# Patient Record
Sex: MALE | Race: WHITE | Employment: OTHER | URBAN - METROPOLITAN AREA
[De-identification: names, ages, dates, MRNs, and addresses within clinical notes are randomized per-mention and may not be internally consistent; named-entity substitution may affect disease eponyms.]

---

## 2017-10-05 PROBLEM — R55 NEAR SYNCOPE: Status: ACTIVE | Noted: 2017-10-05

## 2017-10-05 PROBLEM — R42 VERTIGO: Status: ACTIVE | Noted: 2017-10-05

## 2017-10-05 PROBLEM — G47.30 SLEEP APNEA: Status: ACTIVE | Noted: 2017-10-05

## 2017-10-05 PROBLEM — G45.0 VERTEBROBASILAR TIAS: Status: ACTIVE | Noted: 2017-10-05

## 2018-03-15 ENCOUNTER — HOSPITAL ENCOUNTER (OUTPATIENT)
Age: 73
Discharge: HOME | End: 2018-03-17
Payer: MEDICARE

## 2018-03-15 LAB
ALBUMIN SERPL-MCNC: 4 G/DL (ref 3.5–5.2)
ALP BLD-CCNC: 46 U/L (ref 40–129)
ALT SERPL-CCNC: 14 U/L (ref 0–40)
ANION GAP SERPL CALCULATED.3IONS-SCNC: 15 MMOL/L (ref 7–16)
AST SERPL-CCNC: 16 U/L (ref 0–39)
BASOPHILS ABSOLUTE: 0.02 E9/L (ref 0–0.2)
BASOPHILS RELATIVE PERCENT: 0.3 % (ref 0–2)
BILIRUB SERPL-MCNC: 0.4 MG/DL (ref 0–1.2)
BUN BLDV-MCNC: 21 MG/DL (ref 8–23)
CALCIUM SERPL-MCNC: 9.5 MG/DL (ref 8.6–10.2)
CHLORIDE BLD-SCNC: 101 MMOL/L (ref 98–107)
CHOLESTEROL, TOTAL: 170 MG/DL (ref 0–199)
CO2: 25 MMOL/L (ref 22–29)
CREAT SERPL-MCNC: 1.3 MG/DL (ref 0.7–1.2)
EOSINOPHILS ABSOLUTE: 0.24 E9/L (ref 0.05–0.5)
EOSINOPHILS RELATIVE PERCENT: 3.1 % (ref 0–6)
GFR AFRICAN AMERICAN: >60
GFR NON-AFRICAN AMERICAN: 54 ML/MIN/1.73
GLUCOSE BLD-MCNC: 140 MG/DL (ref 74–109)
HCT VFR BLD CALC: 44.7 % (ref 37–54)
HDLC SERPL-MCNC: 42 MG/DL
HEMOGLOBIN: 15.2 G/DL (ref 12.5–16.5)
IMMATURE GRANULOCYTES #: 0.06 E9/L
IMMATURE GRANULOCYTES %: 0.8 % (ref 0–5)
LDL CHOLESTEROL CALCULATED: 80 MG/DL (ref 0–99)
LYMPHOCYTES ABSOLUTE: 2.17 E9/L (ref 1.5–4)
LYMPHOCYTES RELATIVE PERCENT: 27.9 % (ref 20–42)
MCH RBC QN AUTO: 31.7 PG (ref 26–35)
MCHC RBC AUTO-ENTMCNC: 34 % (ref 32–34.5)
MCV RBC AUTO: 93.3 FL (ref 80–99.9)
MONOCYTES ABSOLUTE: 0.78 E9/L (ref 0.1–0.95)
MONOCYTES RELATIVE PERCENT: 10 % (ref 2–12)
NEUTROPHILS ABSOLUTE: 4.5 E9/L (ref 1.8–7.3)
NEUTROPHILS RELATIVE PERCENT: 57.9 % (ref 43–80)
PDW BLD-RTO: 12.6 FL (ref 11.5–15)
PLATELET # BLD: 298 E9/L (ref 130–450)
PMV BLD AUTO: 10.4 FL (ref 7–12)
POTASSIUM SERPL-SCNC: 5 MMOL/L (ref 3.5–5)
PROSTATE SPECIFIC ANTIGEN: 2.77 NG/ML (ref 0–4)
RBC # BLD: 4.79 E12/L (ref 3.8–5.8)
SODIUM BLD-SCNC: 141 MMOL/L (ref 132–146)
TOTAL PROTEIN: 7.3 G/DL (ref 6.4–8.3)
TRIGL SERPL-MCNC: 238 MG/DL (ref 0–149)
TSH SERPL DL<=0.05 MIU/L-ACNC: 1.19 UIU/ML (ref 0.27–4.2)
VITAMIN D 25-HYDROXY: 26 NG/ML (ref 30–100)
VLDLC SERPL CALC-MCNC: 48 MG/DL
WBC # BLD: 7.8 E9/L (ref 4.5–11.5)

## 2018-03-15 PROCEDURE — 80061 LIPID PANEL: CPT

## 2018-03-15 PROCEDURE — 80053 COMPREHEN METABOLIC PANEL: CPT

## 2018-03-15 PROCEDURE — 85025 COMPLETE CBC W/AUTO DIFF WBC: CPT

## 2018-03-15 PROCEDURE — 84443 ASSAY THYROID STIM HORMONE: CPT

## 2018-03-15 PROCEDURE — 82306 VITAMIN D 25 HYDROXY: CPT

## 2018-03-15 PROCEDURE — G0103 PSA SCREENING: HCPCS

## 2018-06-13 ENCOUNTER — HOSPITAL ENCOUNTER (OUTPATIENT)
Age: 73
Discharge: HOME | End: 2018-06-15
Payer: MEDICARE

## 2018-06-13 LAB
ALBUMIN SERPL-MCNC: 4.2 G/DL (ref 3.5–5.2)
ALP BLD-CCNC: 40 U/L (ref 40–129)
ALT SERPL-CCNC: 15 U/L (ref 0–40)
ANION GAP SERPL CALCULATED.3IONS-SCNC: 17 MMOL/L (ref 7–16)
AST SERPL-CCNC: 19 U/L (ref 0–39)
BASOPHILS ABSOLUTE: 0.04 E9/L (ref 0–0.2)
BASOPHILS RELATIVE PERCENT: 0.4 % (ref 0–2)
BILIRUB SERPL-MCNC: 0.4 MG/DL (ref 0–1.2)
BUN BLDV-MCNC: 24 MG/DL (ref 8–23)
CALCIUM SERPL-MCNC: 9.8 MG/DL (ref 8.6–10.2)
CHLORIDE BLD-SCNC: 102 MMOL/L (ref 98–107)
CHOLESTEROL, TOTAL: 163 MG/DL (ref 0–199)
CO2: 23 MMOL/L (ref 22–29)
CREAT SERPL-MCNC: 1.4 MG/DL (ref 0.7–1.2)
EOSINOPHILS ABSOLUTE: 0.24 E9/L (ref 0.05–0.5)
EOSINOPHILS RELATIVE PERCENT: 2.5 % (ref 0–6)
GFR AFRICAN AMERICAN: 60
GFR NON-AFRICAN AMERICAN: 49 ML/MIN/1.73
GLUCOSE BLD-MCNC: 135 MG/DL (ref 74–109)
HBA1C MFR BLD: 6.9 % (ref 4.8–5.9)
HCT VFR BLD CALC: 43.7 % (ref 37–54)
HDLC SERPL-MCNC: 46 MG/DL
HEMOGLOBIN: 14.8 G/DL (ref 12.5–16.5)
IMMATURE GRANULOCYTES #: 0.05 E9/L
IMMATURE GRANULOCYTES %: 0.5 % (ref 0–5)
LDL CHOLESTEROL CALCULATED: 81 MG/DL (ref 0–99)
LYMPHOCYTES ABSOLUTE: 1.96 E9/L (ref 1.5–4)
LYMPHOCYTES RELATIVE PERCENT: 20.2 % (ref 20–42)
MCH RBC QN AUTO: 32.7 PG (ref 26–35)
MCHC RBC AUTO-ENTMCNC: 33.9 % (ref 32–34.5)
MCV RBC AUTO: 96.7 FL (ref 80–99.9)
MONOCYTES ABSOLUTE: 0.92 E9/L (ref 0.1–0.95)
MONOCYTES RELATIVE PERCENT: 9.5 % (ref 2–12)
NEUTROPHILS ABSOLUTE: 6.5 E9/L (ref 1.8–7.3)
NEUTROPHILS RELATIVE PERCENT: 66.9 % (ref 43–80)
PDW BLD-RTO: 13 FL (ref 11.5–15)
PLATELET # BLD: 313 E9/L (ref 130–450)
PMV BLD AUTO: 9.9 FL (ref 7–12)
POTASSIUM SERPL-SCNC: 5.4 MMOL/L (ref 3.5–5)
RBC # BLD: 4.52 E12/L (ref 3.8–5.8)
SODIUM BLD-SCNC: 142 MMOL/L (ref 132–146)
TOTAL PROTEIN: 7.4 G/DL (ref 6.4–8.3)
TRIGL SERPL-MCNC: 182 MG/DL (ref 0–149)
TSH SERPL DL<=0.05 MIU/L-ACNC: 1.26 UIU/ML (ref 0.27–4.2)
VITAMIN D 25-HYDROXY: 31 NG/ML (ref 30–100)
VLDLC SERPL CALC-MCNC: 36 MG/DL
WBC # BLD: 9.7 E9/L (ref 4.5–11.5)

## 2018-06-13 PROCEDURE — 85025 COMPLETE CBC W/AUTO DIFF WBC: CPT

## 2018-06-13 PROCEDURE — 80061 LIPID PANEL: CPT

## 2018-06-13 PROCEDURE — 82306 VITAMIN D 25 HYDROXY: CPT

## 2018-06-13 PROCEDURE — 84443 ASSAY THYROID STIM HORMONE: CPT

## 2018-06-13 PROCEDURE — 83036 HEMOGLOBIN GLYCOSYLATED A1C: CPT

## 2018-06-13 PROCEDURE — 80053 COMPREHEN METABOLIC PANEL: CPT

## 2019-02-27 ENCOUNTER — HOSPITAL ENCOUNTER (OUTPATIENT)
Age: 74
Discharge: HOME | End: 2019-03-01
Payer: MEDICARE

## 2019-02-27 LAB — PROSTATE SPECIFIC ANTIGEN: 2.77 NG/ML (ref 0–4)

## 2019-02-27 PROCEDURE — G0103 PSA SCREENING: HCPCS
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
## 2019-04-26 ENCOUNTER — ANESTHESIA EVENT (OUTPATIENT)
Dept: OPERATING ROOM | Age: 74
End: 2019-04-26
Payer: MEDICARE

## 2019-04-29 ASSESSMENT — LIFESTYLE VARIABLES: SMOKING_STATUS: 0
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
## 2019-04-30 ENCOUNTER — HOSPITAL ENCOUNTER (OUTPATIENT)
Age: 74
Setting detail: OUTPATIENT SURGERY
Discharge: HOME | End: 2019-04-30
Attending: OPHTHALMOLOGY | Admitting: OPHTHALMOLOGY
Payer: MEDICARE

## 2019-04-30 ENCOUNTER — ANESTHESIA (OUTPATIENT)
Dept: OPERATING ROOM | Age: 74
End: 2019-04-30
Payer: MEDICARE

## 2019-04-30 VITALS
DIASTOLIC BLOOD PRESSURE: 86 MMHG | HEIGHT: 70 IN | HEART RATE: 72 BPM | SYSTOLIC BLOOD PRESSURE: 154 MMHG | OXYGEN SATURATION: 97 % | TEMPERATURE: 97 F | RESPIRATION RATE: 18 BRPM | WEIGHT: 280 LBS | BODY MASS INDEX: 40.09 KG/M2

## 2019-04-30 VITALS
RESPIRATION RATE: 19 BRPM | OXYGEN SATURATION: 97 % | SYSTOLIC BLOOD PRESSURE: 126 MMHG | DIASTOLIC BLOOD PRESSURE: 74 MMHG

## 2019-04-30 PROBLEM — H26.9 RIGHT CATARACT: Status: ACTIVE | Noted: 2019-04-30

## 2019-04-30 PROCEDURE — 3600000003 HC SURGERY LEVEL 3 BASE: Performed by: OPHTHALMOLOGY

## 2019-04-30 PROCEDURE — 2709999900 HC NON-CHARGEABLE SUPPLY: Performed by: OPHTHALMOLOGY

## 2019-04-30 PROCEDURE — 6370000000 HC RX 637 (ALT 250 FOR IP): Performed by: OPHTHALMOLOGY

## 2019-04-30 PROCEDURE — 3700000000 HC ANESTHESIA ATTENDED CARE: Performed by: OPHTHALMOLOGY

## 2019-04-30 PROCEDURE — 7100000011 HC PHASE II RECOVERY - ADDTL 15 MIN: Performed by: OPHTHALMOLOGY

## 2019-04-30 PROCEDURE — 2580000003 HC RX 258: Performed by: ANESTHESIOLOGY

## 2019-04-30 PROCEDURE — 7100000010 HC PHASE II RECOVERY - FIRST 15 MIN: Performed by: OPHTHALMOLOGY

## 2019-04-30 PROCEDURE — 2500000003 HC RX 250 WO HCPCS: Performed by: OPHTHALMOLOGY

## 2019-04-30 PROCEDURE — V2632 POST CHMBR INTRAOCULAR LENS: HCPCS | Performed by: OPHTHALMOLOGY

## 2019-04-30 PROCEDURE — 6360000002 HC RX W HCPCS: Performed by: NURSE ANESTHETIST, CERTIFIED REGISTERED

## 2019-04-30 PROCEDURE — 2500000003 HC RX 250 WO HCPCS: Performed by: NURSE ANESTHETIST, CERTIFIED REGISTERED

## 2019-04-30 DEVICE — LENS INTOCU +21.5 DIOPT A CONSTANT 118.8 L13MM DIA6MM 0DEG: Type: IMPLANTABLE DEVICE | Site: EYE | Status: FUNCTIONAL

## 2019-04-30 RX ORDER — TETRACAINE HYDROCHLORIDE 5 MG/ML
1 SOLUTION OPHTHALMIC ONCE
Status: COMPLETED | OUTPATIENT
Start: 2019-04-30 | End: 2019-04-30

## 2019-04-30 RX ORDER — TETRACAINE HYDROCHLORIDE 5 MG/ML
SOLUTION OPHTHALMIC PRN
Status: DISCONTINUED | OUTPATIENT
Start: 2019-04-30 | End: 2019-04-30 | Stop reason: ALTCHOICE

## 2019-04-30 RX ORDER — PHENYLEPHRINE HYDROCHLORIDE 100 MG/ML
1 SOLUTION/ DROPS OPHTHALMIC PRN
Status: DISCONTINUED | OUTPATIENT
Start: 2019-04-30 | End: 2019-04-30 | Stop reason: HOSPADM

## 2019-04-30 RX ORDER — BALANCED SALT SOLUTION 6.4; .75; .48; .3; 3.9; 1.7 MG/ML; MG/ML; MG/ML; MG/ML; MG/ML; MG/ML
SOLUTION OPHTHALMIC PRN
Status: DISCONTINUED | OUTPATIENT
Start: 2019-04-30 | End: 2019-04-30 | Stop reason: ALTCHOICE

## 2019-04-30 RX ORDER — FLURBIPROFEN SODIUM 0.3 MG/ML
1 SOLUTION/ DROPS OPHTHALMIC
Status: COMPLETED | OUTPATIENT
Start: 2019-04-30 | End: 2019-04-30

## 2019-04-30 RX ORDER — CYCLOPENTOLATE HYDROCHLORIDE 10 MG/ML
1 SOLUTION/ DROPS OPHTHALMIC
Status: COMPLETED | OUTPATIENT
Start: 2019-04-30 | End: 2019-04-30

## 2019-04-30 RX ORDER — SODIUM CHLORIDE, SODIUM LACTATE, POTASSIUM CHLORIDE, CALCIUM CHLORIDE 600; 310; 30; 20 MG/100ML; MG/100ML; MG/100ML; MG/100ML
INJECTION, SOLUTION INTRAVENOUS CONTINUOUS
Status: DISCONTINUED | OUTPATIENT
Start: 2019-04-30 | End: 2019-04-30 | Stop reason: HOSPADM

## 2019-04-30 RX ORDER — ALFENTANIL HYDROCHLORIDE 500 UG/ML
INJECTION INTRAVENOUS PRN
Status: DISCONTINUED | OUTPATIENT
Start: 2019-04-30 | End: 2019-04-30 | Stop reason: SDUPTHER

## 2019-04-30 RX ORDER — PHENYLEPHRINE HCL 2.5 %
1 DROPS OPHTHALMIC (EYE)
Status: COMPLETED | OUTPATIENT
Start: 2019-04-30 | End: 2019-04-30

## 2019-04-30 RX ORDER — MIDAZOLAM HYDROCHLORIDE 1 MG/ML
INJECTION INTRAMUSCULAR; INTRAVENOUS PRN
Status: DISCONTINUED | OUTPATIENT
Start: 2019-04-30 | End: 2019-04-30 | Stop reason: SDUPTHER

## 2019-04-30 RX ADMIN — TETRACAINE HYDROCHLORIDE 1 DROP: 25 LIQUID OPHTHALMIC at 07:22

## 2019-04-30 RX ADMIN — SODIUM CHLORIDE, POTASSIUM CHLORIDE, SODIUM LACTATE AND CALCIUM CHLORIDE: 600; 310; 30; 20 INJECTION, SOLUTION INTRAVENOUS at 07:21

## 2019-04-30 RX ADMIN — FLURBIPROFEN SODIUM 1 DROP: 0.3 SOLUTION/ DROPS OPHTHALMIC at 07:00

## 2019-04-30 RX ADMIN — FLURBIPROFEN SODIUM 1 DROP: 0.3 SOLUTION/ DROPS OPHTHALMIC at 07:05

## 2019-04-30 RX ADMIN — ALFENTANIL HYDROCHLORIDE 250 MCG: 500 INJECTION INTRAVENOUS at 07:58

## 2019-04-30 RX ADMIN — PHENYLEPHRINE HYDROCHLORIDE 1 DROP: 25 SOLUTION/ DROPS OPHTHALMIC at 07:00

## 2019-04-30 RX ADMIN — CYCLOPENTOLATE HYDROCHLORIDE 1 DROP: 10 SOLUTION/ DROPS OPHTHALMIC at 07:10

## 2019-04-30 RX ADMIN — ALFENTANIL HYDROCHLORIDE 250 MCG: 500 INJECTION INTRAVENOUS at 07:51

## 2019-04-30 RX ADMIN — CYCLOPENTOLATE HYDROCHLORIDE 1 DROP: 10 SOLUTION/ DROPS OPHTHALMIC at 07:00

## 2019-04-30 RX ADMIN — PHENYLEPHRINE HYDROCHLORIDE 1 DROP: 25 SOLUTION/ DROPS OPHTHALMIC at 07:05

## 2019-04-30 RX ADMIN — PHENYLEPHRINE HYDROCHLORIDE 1 DROP: 25 SOLUTION/ DROPS OPHTHALMIC at 07:10

## 2019-04-30 RX ADMIN — MIDAZOLAM 1 MG: 1 INJECTION INTRAMUSCULAR; INTRAVENOUS at 07:51

## 2019-04-30 RX ADMIN — CYCLOPENTOLATE HYDROCHLORIDE 1 DROP: 10 SOLUTION/ DROPS OPHTHALMIC at 07:05

## 2019-04-30 RX ADMIN — FLURBIPROFEN SODIUM 1 DROP: 0.3 SOLUTION/ DROPS OPHTHALMIC at 07:10

## 2019-04-30 ASSESSMENT — PULMONARY FUNCTION TESTS
PIF_VALUE: 0

## 2019-04-30 ASSESSMENT — PAIN SCALES - GENERAL
PAINLEVEL_OUTOF10: 0

## 2019-04-30 ASSESSMENT — PAIN - FUNCTIONAL ASSESSMENT: PAIN_FUNCTIONAL_ASSESSMENT: 0-10

## 2019-04-30 NOTE — ANESTHESIA POSTPROCEDURE EVALUATION
Department of Anesthesiology  Postprocedure Note    Patient: Js Orona  MRN: [id number]  YOB: 1945  Date of evaluation: 4/30/2019  Time:  8:35 AM     Procedure Summary     Date:  04/30/19 Room / Location:  [address] [place] [address] 02 / [address] [place] [address]    Anesthesia Start:  0751 Anesthesia Stop:  0805    Procedure:  RIGHT EYE CATARACT EMULSIFICATION IOL IMPLANT (Right ) Diagnosis:  (RIGHT EYE CATARACT)    Surgeon:  Tyrone Chu MD Responsible Provider:  Shannen Kaye MD    Anesthesia Type:  MAC ASA Status:  3          Anesthesia Type: MAC    Andres Phase I: Andres Score: 10    Andres Phase II: Andres Score: 10    Last vitals: Reviewed and per EMR flowsheets.        Anesthesia Post Evaluation    Patient location during evaluation: PACU  Patient participation: complete - patient participated  Level of consciousness: awake and alert  Airway patency: patent  Nausea & Vomiting: no nausea and no vomiting  Complications: no  Cardiovascular status: hemodynamically stable  Respiratory status: acceptable  Hydration status: euvolemic

## 2019-04-30 NOTE — OP NOTE
PREOPERATIVE DIAGNOSIS:  Right Cataract    POSTOPERATIVE DIAGNOSIS:  Right Cataract    PROCEDURE:  Right phacoemulsification with intraocular lens implant. ANESTHESIA:  Local Mac    ESTIMATED BLOOD LOSS:  Minimal    COMPLICATIONS:  None    DESCRIPTION OF PROCEDURE:  The patient was brought into the operating room. The operative eye was marked, which was the right eye. The right eye was then prepped with a full-strength Betadine preparation. The periorbital area was copiously washed with Betadine. The lashes were washed with Betadine. Dilute Betadine was then also put in the inferior and superior fornices and left in place for approximately a minute or 2. This was then irrigated with sterile water. The face was then wiped and the preparation was repeated 1 more time. The drape was then placed over the operative eye with the sticky adhesive placed at the lid margins so that it draped the lashes out of the operative field superiorly and inferiorly, as well as isolated the meibomian glands behind the drape. This cleared the operative field of any meibomian gland secretions and eyelashes. Next, a lid speculum was positioned in the right eye. A super sharp blade was used to make a side-port incision at the 2 o'clock position. A clear corneal incision was fashioned over the 12 o;clock meridian with a 2.3mm keratome at the limbus. Healon was used to reform the anterior chamber. A continuous tear anterior capsulotomy was then performed with a bent needle cystotome. Hydrodissection was performed by irrigating with balanced salt solution through a syringe underneath the anterior capsular flap to loosen and allow free rotation of the lens nucleus. Phacoemulsification was used and the entire lens nucleus was removed. The I A unit was instilled in the eye, and the remaining cortex was removed. Healon was used to separate the anterior and posterior capsular flaps.   The PCBOO 21.5 diopter lens was then

## 2019-05-09 ENCOUNTER — HOSPITAL ENCOUNTER (OUTPATIENT)
Age: 74
Discharge: HOME | End: 2019-05-11
Payer: MEDICARE

## 2019-05-09 LAB
ALBUMIN SERPL-MCNC: 4.1 G/DL (ref 3.5–5.2)
ALP BLD-CCNC: 44 U/L (ref 40–129)
ALT SERPL-CCNC: 13 U/L (ref 0–40)
ANION GAP SERPL CALCULATED.3IONS-SCNC: 17 MMOL/L (ref 7–16)
AST SERPL-CCNC: 17 U/L (ref 0–39)
BASOPHILS ABSOLUTE: 0.04 E9/L (ref 0–0.2)
BASOPHILS RELATIVE PERCENT: 0.5 % (ref 0–2)
BILIRUB SERPL-MCNC: 0.2 MG/DL (ref 0–1.2)
BUN BLDV-MCNC: 17 MG/DL (ref 8–23)
CALCIUM SERPL-MCNC: 9.8 MG/DL (ref 8.6–10.2)
CHLORIDE BLD-SCNC: 101 MMOL/L (ref 98–107)
CHOLESTEROL, TOTAL: 176 MG/DL (ref 0–199)
CO2: 24 MMOL/L (ref 22–29)
CREAT SERPL-MCNC: 1.4 MG/DL (ref 0.7–1.2)
EOSINOPHILS ABSOLUTE: 0.16 E9/L (ref 0.05–0.5)
EOSINOPHILS RELATIVE PERCENT: 2 % (ref 0–6)
GFR AFRICAN AMERICAN: 60
GFR NON-AFRICAN AMERICAN: 49 ML/MIN/1.73
GLUCOSE BLD-MCNC: 126 MG/DL (ref 74–99)
HCT VFR BLD CALC: 46.3 % (ref 37–54)
HDLC SERPL-MCNC: 51 MG/DL
HEMOGLOBIN: 15.1 G/DL (ref 12.5–16.5)
IMMATURE GRANULOCYTES #: 0.05 E9/L
IMMATURE GRANULOCYTES %: 0.6 % (ref 0–5)
LDL CHOLESTEROL CALCULATED: 83 MG/DL (ref 0–99)
LYMPHOCYTES ABSOLUTE: 2 E9/L (ref 1.5–4)
LYMPHOCYTES RELATIVE PERCENT: 25.5 % (ref 20–42)
MCH RBC QN AUTO: 31.5 PG (ref 26–35)
MCHC RBC AUTO-ENTMCNC: 32.6 % (ref 32–34.5)
MCV RBC AUTO: 96.7 FL (ref 80–99.9)
MONOCYTES ABSOLUTE: 0.77 E9/L (ref 0.1–0.95)
MONOCYTES RELATIVE PERCENT: 9.8 % (ref 2–12)
NEUTROPHILS ABSOLUTE: 4.81 E9/L (ref 1.8–7.3)
NEUTROPHILS RELATIVE PERCENT: 61.6 % (ref 43–80)
PDW BLD-RTO: 12.8 FL (ref 11.5–15)
PLATELET # BLD: 303 E9/L (ref 130–450)
PMV BLD AUTO: 10.3 FL (ref 7–12)
POTASSIUM SERPL-SCNC: 5.1 MMOL/L (ref 3.5–5)
PROSTATE SPECIFIC ANTIGEN: 2.96 NG/ML (ref 0–4)
RBC # BLD: 4.79 E12/L (ref 3.8–5.8)
SODIUM BLD-SCNC: 142 MMOL/L (ref 132–146)
TOTAL PROTEIN: 7.2 G/DL (ref 6.4–8.3)
TRIGL SERPL-MCNC: 212 MG/DL (ref 0–149)
TSH SERPL DL<=0.05 MIU/L-ACNC: 1.15 UIU/ML (ref 0.27–4.2)
VITAMIN D 25-HYDROXY: 26 NG/ML (ref 30–100)
VLDLC SERPL CALC-MCNC: 42 MG/DL
WBC # BLD: 7.8 E9/L (ref 4.5–11.5)

## 2019-05-09 PROCEDURE — G0103 PSA SCREENING: HCPCS

## 2019-05-09 PROCEDURE — 80053 COMPREHEN METABOLIC PANEL: CPT

## 2019-05-09 PROCEDURE — 80061 LIPID PANEL: CPT

## 2019-05-09 PROCEDURE — 82306 VITAMIN D 25 HYDROXY: CPT

## 2019-05-09 PROCEDURE — 84443 ASSAY THYROID STIM HORMONE: CPT

## 2019-05-09 PROCEDURE — 85025 COMPLETE CBC W/AUTO DIFF WBC: CPT

## 2019-10-07 ENCOUNTER — ANESTHESIA EVENT (OUTPATIENT)
Dept: OPERATING ROOM | Age: 74
End: 2019-10-07
Payer: MEDICARE

## 2019-10-07 ASSESSMENT — LIFESTYLE VARIABLES: SMOKING_STATUS: 0

## 2019-10-08 ENCOUNTER — HOSPITAL ENCOUNTER (OUTPATIENT)
Age: 74
Setting detail: OUTPATIENT SURGERY
Discharge: HOME | End: 2019-10-08
Attending: OPHTHALMOLOGY | Admitting: OPHTHALMOLOGY
Payer: MEDICARE

## 2019-10-08 ENCOUNTER — ANESTHESIA (OUTPATIENT)
Dept: OPERATING ROOM | Age: 74
End: 2019-10-08
Payer: MEDICARE

## 2019-10-08 VITALS
OXYGEN SATURATION: 96 % | HEART RATE: 69 BPM | HEIGHT: 71 IN | SYSTOLIC BLOOD PRESSURE: 140 MMHG | BODY MASS INDEX: 38.78 KG/M2 | WEIGHT: 277 LBS | RESPIRATION RATE: 16 BRPM | DIASTOLIC BLOOD PRESSURE: 82 MMHG

## 2019-10-08 VITALS
RESPIRATION RATE: 16 BRPM | TEMPERATURE: 98.6 F | DIASTOLIC BLOOD PRESSURE: 73 MMHG | OXYGEN SATURATION: 97 % | SYSTOLIC BLOOD PRESSURE: 131 MMHG

## 2019-10-08 PROBLEM — H26.9 LEFT CATARACT: Status: ACTIVE | Noted: 2019-10-08

## 2019-10-08 PROCEDURE — 2500000003 HC RX 250 WO HCPCS: Performed by: NURSE ANESTHETIST, CERTIFIED REGISTERED

## 2019-10-08 PROCEDURE — 6370000000 HC RX 637 (ALT 250 FOR IP): Performed by: OPHTHALMOLOGY

## 2019-10-08 PROCEDURE — 2500000003 HC RX 250 WO HCPCS: Performed by: OPHTHALMOLOGY

## 2019-10-08 PROCEDURE — 2580000003 HC RX 258: Performed by: ANESTHESIOLOGY

## 2019-10-08 PROCEDURE — 7100000011 HC PHASE II RECOVERY - ADDTL 15 MIN: Performed by: OPHTHALMOLOGY

## 2019-10-08 PROCEDURE — 6360000002 HC RX W HCPCS: Performed by: NURSE ANESTHETIST, CERTIFIED REGISTERED

## 2019-10-08 PROCEDURE — 2709999900 HC NON-CHARGEABLE SUPPLY: Performed by: OPHTHALMOLOGY

## 2019-10-08 PROCEDURE — 3600000003 HC SURGERY LEVEL 3 BASE: Performed by: OPHTHALMOLOGY

## 2019-10-08 PROCEDURE — 3700000000 HC ANESTHESIA ATTENDED CARE: Performed by: OPHTHALMOLOGY

## 2019-10-08 PROCEDURE — 7100000010 HC PHASE II RECOVERY - FIRST 15 MIN: Performed by: OPHTHALMOLOGY

## 2019-10-08 PROCEDURE — V2632 POST CHMBR INTRAOCULAR LENS: HCPCS | Performed by: OPHTHALMOLOGY

## 2019-10-08 DEVICE — LENS INTOCU +21.5 DIOPT A CONSTANT 118.8 L13MM DIA6MM 0DEG: Type: IMPLANTABLE DEVICE | Site: EYE | Status: FUNCTIONAL

## 2019-10-08 RX ORDER — PHENYLEPHRINE HCL 2.5 %
1 DROPS OPHTHALMIC (EYE)
Status: COMPLETED | OUTPATIENT
Start: 2019-10-08 | End: 2019-10-08

## 2019-10-08 RX ORDER — CYCLOPENTOLATE HYDROCHLORIDE 10 MG/ML
1 SOLUTION/ DROPS OPHTHALMIC
Status: COMPLETED | OUTPATIENT
Start: 2019-10-08 | End: 2019-10-08

## 2019-10-08 RX ORDER — ALFENTANIL HYDROCHLORIDE 500 UG/ML
INJECTION INTRAVENOUS PRN
Status: DISCONTINUED | OUTPATIENT
Start: 2019-10-08 | End: 2019-10-08 | Stop reason: SDUPTHER

## 2019-10-08 RX ORDER — MEPERIDINE HYDROCHLORIDE 50 MG/ML
12.5 INJECTION INTRAMUSCULAR; INTRAVENOUS; SUBCUTANEOUS EVERY 5 MIN PRN
Status: DISCONTINUED | OUTPATIENT
Start: 2019-10-08 | End: 2019-10-08 | Stop reason: HOSPADM

## 2019-10-08 RX ORDER — SODIUM CHLORIDE, SODIUM LACTATE, POTASSIUM CHLORIDE, CALCIUM CHLORIDE 600; 310; 30; 20 MG/100ML; MG/100ML; MG/100ML; MG/100ML
INJECTION, SOLUTION INTRAVENOUS CONTINUOUS
Status: DISCONTINUED | OUTPATIENT
Start: 2019-10-08 | End: 2019-10-08 | Stop reason: HOSPADM

## 2019-10-08 RX ORDER — PHENYLEPHRINE HYDROCHLORIDE 100 MG/ML
1 SOLUTION/ DROPS OPHTHALMIC PRN
Status: DISCONTINUED | OUTPATIENT
Start: 2019-10-08 | End: 2019-10-08 | Stop reason: HOSPADM

## 2019-10-08 RX ORDER — MIDAZOLAM HYDROCHLORIDE 1 MG/ML
INJECTION INTRAMUSCULAR; INTRAVENOUS PRN
Status: DISCONTINUED | OUTPATIENT
Start: 2019-10-08 | End: 2019-10-08 | Stop reason: SDUPTHER

## 2019-10-08 RX ORDER — BALANCED SALT SOLUTION 6.4; .75; .48; .3; 3.9; 1.7 MG/ML; MG/ML; MG/ML; MG/ML; MG/ML; MG/ML
SOLUTION OPHTHALMIC PRN
Status: DISCONTINUED | OUTPATIENT
Start: 2019-10-08 | End: 2019-10-08 | Stop reason: ALTCHOICE

## 2019-10-08 RX ORDER — HYDRALAZINE HYDROCHLORIDE 20 MG/ML
5 INJECTION INTRAMUSCULAR; INTRAVENOUS EVERY 10 MIN PRN
Status: DISCONTINUED | OUTPATIENT
Start: 2019-10-08 | End: 2019-10-08 | Stop reason: HOSPADM

## 2019-10-08 RX ORDER — TETRACAINE HYDROCHLORIDE 5 MG/ML
1 SOLUTION OPHTHALMIC ONCE
Status: DISCONTINUED | OUTPATIENT
Start: 2019-10-08 | End: 2019-10-08 | Stop reason: HOSPADM

## 2019-10-08 RX ORDER — TETRACAINE HYDROCHLORIDE 5 MG/ML
SOLUTION OPHTHALMIC PRN
Status: DISCONTINUED | OUTPATIENT
Start: 2019-10-08 | End: 2019-10-08 | Stop reason: ALTCHOICE

## 2019-10-08 RX ORDER — FLURBIPROFEN SODIUM 0.3 MG/ML
1 SOLUTION/ DROPS OPHTHALMIC
Status: COMPLETED | OUTPATIENT
Start: 2019-10-08 | End: 2019-10-08

## 2019-10-08 RX ORDER — LABETALOL 20 MG/4 ML (5 MG/ML) INTRAVENOUS SYRINGE
5 EVERY 10 MIN PRN
Status: DISCONTINUED | OUTPATIENT
Start: 2019-10-08 | End: 2019-10-08 | Stop reason: HOSPADM

## 2019-10-08 RX ORDER — PROMETHAZINE HYDROCHLORIDE 25 MG/ML
6.25 INJECTION, SOLUTION INTRAMUSCULAR; INTRAVENOUS
Status: DISCONTINUED | OUTPATIENT
Start: 2019-10-08 | End: 2019-10-08 | Stop reason: HOSPADM

## 2019-10-08 RX ADMIN — ALFENTANIL HYDROCHLORIDE 250 MCG: 500 INJECTION INTRAVENOUS at 08:39

## 2019-10-08 RX ADMIN — MIDAZOLAM 1 MG: 1 INJECTION INTRAMUSCULAR; INTRAVENOUS at 08:35

## 2019-10-08 RX ADMIN — SODIUM CHLORIDE, POTASSIUM CHLORIDE, SODIUM LACTATE AND CALCIUM CHLORIDE: 600; 310; 30; 20 INJECTION, SOLUTION INTRAVENOUS at 08:21

## 2019-10-08 RX ADMIN — CYCLOPENTOLATE HYDROCHLORIDE 1 DROP: 10 SOLUTION/ DROPS OPHTHALMIC at 07:20

## 2019-10-08 RX ADMIN — PHENYLEPHRINE HYDROCHLORIDE 1 DROP: 25 SOLUTION OPHTHALMIC at 07:15

## 2019-10-08 RX ADMIN — ALFENTANIL HYDROCHLORIDE 250 MCG: 500 INJECTION INTRAVENOUS at 08:35

## 2019-10-08 RX ADMIN — CYCLOPENTOLATE HYDROCHLORIDE 1 DROP: 10 SOLUTION/ DROPS OPHTHALMIC at 07:25

## 2019-10-08 RX ADMIN — FLURBIPROFEN SODIUM 1 DROP: 0.3 SOLUTION/ DROPS OPHTHALMIC at 07:15

## 2019-10-08 RX ADMIN — PHENYLEPHRINE HYDROCHLORIDE 1 DROP: 25 SOLUTION OPHTHALMIC at 07:25

## 2019-10-08 RX ADMIN — ALFENTANIL HYDROCHLORIDE 250 MCG: 500 INJECTION INTRAVENOUS at 08:36

## 2019-10-08 RX ADMIN — FLURBIPROFEN SODIUM 1 DROP: 0.3 SOLUTION/ DROPS OPHTHALMIC at 07:25

## 2019-10-08 RX ADMIN — FLURBIPROFEN SODIUM 1 DROP: 0.3 SOLUTION/ DROPS OPHTHALMIC at 07:20

## 2019-10-08 RX ADMIN — PHENYLEPHRINE HYDROCHLORIDE 1 DROP: 25 SOLUTION OPHTHALMIC at 07:20

## 2019-10-08 RX ADMIN — CYCLOPENTOLATE HYDROCHLORIDE 1 DROP: 10 SOLUTION/ DROPS OPHTHALMIC at 07:15

## 2019-10-08 RX ADMIN — MIDAZOLAM 1 MG: 1 INJECTION INTRAMUSCULAR; INTRAVENOUS at 08:34

## 2019-10-08 RX ADMIN — ALFENTANIL HYDROCHLORIDE 250 MCG: 500 INJECTION INTRAVENOUS at 08:37

## 2019-10-08 ASSESSMENT — PULMONARY FUNCTION TESTS
PIF_VALUE: 0

## 2019-10-08 ASSESSMENT — PAIN SCALES - GENERAL
PAINLEVEL_OUTOF10: 0

## 2019-10-08 ASSESSMENT — PAIN - FUNCTIONAL ASSESSMENT: PAIN_FUNCTIONAL_ASSESSMENT: 0-10

## 2022-05-16 ENCOUNTER — OFFICE VISIT (OUTPATIENT)
Dept: PRIMARY CARE CLINIC | Age: 77
End: 2022-05-16
Payer: MEDICARE

## 2022-05-16 VITALS
TEMPERATURE: 98.2 F | BODY MASS INDEX: 41.02 KG/M2 | DIASTOLIC BLOOD PRESSURE: 84 MMHG | OXYGEN SATURATION: 96 % | WEIGHT: 293 LBS | HEIGHT: 71 IN | HEART RATE: 71 BPM | RESPIRATION RATE: 20 BRPM | SYSTOLIC BLOOD PRESSURE: 162 MMHG

## 2022-05-16 DIAGNOSIS — E11.42 DIABETIC PERIPHERAL NEUROPATHY (HCC): ICD-10-CM

## 2022-05-16 DIAGNOSIS — S99.921A INJURY OF RIGHT FOOT, INITIAL ENCOUNTER: Primary | ICD-10-CM

## 2022-05-16 PROCEDURE — 99213 OFFICE O/P EST LOW 20 MIN: CPT | Performed by: NURSE PRACTITIONER
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
## 2022-05-17 RX ORDER — GABAPENTIN 100 MG/1
100 CAPSULE ORAL NIGHTLY
Qty: 30 CAPSULE | Refills: 0 | Status: SHIPPED
Start: 2022-05-17 | End: 2022-10-24

## 2022-08-02 ENCOUNTER — OFFICE VISIT (OUTPATIENT)
Dept: FAMILY MEDICINE CLINIC | Age: 77
End: 2022-08-02
Payer: MEDICARE

## 2022-08-02 ENCOUNTER — HOSPITAL ENCOUNTER (OUTPATIENT)
Age: 77
Discharge: HOME | End: 2022-08-02
Payer: MEDICARE

## 2022-08-02 VITALS
WEIGHT: 287 LBS | HEIGHT: 71 IN | DIASTOLIC BLOOD PRESSURE: 73 MMHG | HEART RATE: 76 BPM | OXYGEN SATURATION: 97 % | TEMPERATURE: 98.5 F | RESPIRATION RATE: 16 BRPM | SYSTOLIC BLOOD PRESSURE: 144 MMHG | BODY MASS INDEX: 40.18 KG/M2

## 2022-08-02 DIAGNOSIS — H61.23 BILATERAL IMPACTED CERUMEN: ICD-10-CM

## 2022-08-02 DIAGNOSIS — G89.29 HIP PAIN, CHRONIC, LEFT: Primary | ICD-10-CM

## 2022-08-02 DIAGNOSIS — Z96.651 HISTORY OF ARTHROPLASTY OF RIGHT KNEE: ICD-10-CM

## 2022-08-02 DIAGNOSIS — R73.09 ELEVATED GLUCOSE: ICD-10-CM

## 2022-08-02 DIAGNOSIS — M25.551 RIGHT HIP PAIN: ICD-10-CM

## 2022-08-02 DIAGNOSIS — I89.0 LYMPHEDEMA OF BOTH LOWER EXTREMITIES: ICD-10-CM

## 2022-08-02 DIAGNOSIS — N18.30 STAGE 3 CHRONIC KIDNEY DISEASE, UNSPECIFIED WHETHER STAGE 3A OR 3B CKD (HCC): ICD-10-CM

## 2022-08-02 DIAGNOSIS — G89.29 CHRONIC BILATERAL LOW BACK PAIN WITHOUT SCIATICA: ICD-10-CM

## 2022-08-02 DIAGNOSIS — R29.898 LEFT LEG WEAKNESS: ICD-10-CM

## 2022-08-02 DIAGNOSIS — G47.33 OBSTRUCTIVE SLEEP APNEA SYNDROME: ICD-10-CM

## 2022-08-02 DIAGNOSIS — E78.2 MIXED HYPERLIPIDEMIA: ICD-10-CM

## 2022-08-02 DIAGNOSIS — F10.20 ALCOHOLISM (HCC): ICD-10-CM

## 2022-08-02 DIAGNOSIS — I25.10 CORONARY ARTERY DISEASE INVOLVING NATIVE CORONARY ARTERY OF NATIVE HEART, UNSPECIFIED WHETHER ANGINA PRESENT: ICD-10-CM

## 2022-08-02 DIAGNOSIS — M25.562 CHRONIC PAIN OF LEFT KNEE: ICD-10-CM

## 2022-08-02 DIAGNOSIS — R06.09 DOE (DYSPNEA ON EXERTION): ICD-10-CM

## 2022-08-02 DIAGNOSIS — M54.50 CHRONIC BILATERAL LOW BACK PAIN WITHOUT SCIATICA: ICD-10-CM

## 2022-08-02 DIAGNOSIS — M25.552 HIP PAIN, CHRONIC, LEFT: Primary | ICD-10-CM

## 2022-08-02 DIAGNOSIS — G89.29 CHRONIC PAIN OF LEFT KNEE: ICD-10-CM

## 2022-08-02 DIAGNOSIS — I10 PRIMARY HYPERTENSION: ICD-10-CM

## 2022-08-02 DIAGNOSIS — N18.31 STAGE 3A CHRONIC KIDNEY DISEASE (HCC): ICD-10-CM

## 2022-08-02 DIAGNOSIS — Z91.81 AT HIGH RISK FOR FALLS: ICD-10-CM

## 2022-08-02 LAB
ALBUMIN SERPL-MCNC: 4.1 G/DL (ref 3.5–5.2)
ALP BLD-CCNC: 51 U/L (ref 40–129)
ALT SERPL-CCNC: 14 U/L (ref 0–40)
ANION GAP SERPL CALCULATED.3IONS-SCNC: 11 MMOL/L (ref 7–16)
AST SERPL-CCNC: 17 U/L (ref 0–39)
BASOPHILS ABSOLUTE: 0.03 E9/L (ref 0–0.2)
BASOPHILS RELATIVE PERCENT: 0.3 % (ref 0–2)
BILIRUB SERPL-MCNC: 0.4 MG/DL (ref 0–1.2)
BUN BLDV-MCNC: 28 MG/DL (ref 6–23)
CALCIUM SERPL-MCNC: 9.5 MG/DL (ref 8.6–10.2)
CHLORIDE BLD-SCNC: 109 MMOL/L (ref 98–107)
CHOLESTEROL, TOTAL: 122 MG/DL (ref 0–199)
CO2: 24 MMOL/L (ref 22–29)
CREAT SERPL-MCNC: 1.3 MG/DL (ref 0.7–1.2)
CREATININE URINE: 128 MG/DL (ref 40–278)
EOSINOPHILS ABSOLUTE: 0.28 E9/L (ref 0.05–0.5)
EOSINOPHILS RELATIVE PERCENT: 2.6 % (ref 0–6)
GFR AFRICAN AMERICAN: >60
GFR NON-AFRICAN AMERICAN: 53 ML/MIN/1.73
GLUCOSE BLD-MCNC: 115 MG/DL (ref 74–99)
HBA1C MFR BLD: 5.9 % (ref 4–5.6)
HCT VFR BLD CALC: 43.1 % (ref 37–54)
HDLC SERPL-MCNC: 44 MG/DL
HEMOGLOBIN: 14.1 G/DL (ref 12.5–16.5)
IMMATURE GRANULOCYTES #: 0.06 E9/L
IMMATURE GRANULOCYTES %: 0.6 % (ref 0–5)
LDL CHOLESTEROL CALCULATED: 51 MG/DL (ref 0–99)
LYMPHOCYTES ABSOLUTE: 1.66 E9/L (ref 1.5–4)
LYMPHOCYTES RELATIVE PERCENT: 15.7 % (ref 20–42)
MAGNESIUM: 2 MG/DL (ref 1.6–2.6)
MCH RBC QN AUTO: 32.3 PG (ref 26–35)
MCHC RBC AUTO-ENTMCNC: 32.7 % (ref 32–34.5)
MCV RBC AUTO: 98.9 FL (ref 80–99.9)
MICROALBUMIN UR-MCNC: 340.5 MG/L
MICROALBUMIN/CREAT UR-RTO: 266 (ref 0–30)
MONOCYTES ABSOLUTE: 0.9 E9/L (ref 0.1–0.95)
MONOCYTES RELATIVE PERCENT: 8.5 % (ref 2–12)
NEUTROPHILS ABSOLUTE: 7.67 E9/L (ref 1.8–7.3)
NEUTROPHILS RELATIVE PERCENT: 72.3 % (ref 43–80)
PARATHYROID HORMONE INTACT: 36 PG/ML (ref 15–65)
PDW BLD-RTO: 13 FL (ref 11.5–15)
PHOSPHORUS: 3.1 MG/DL (ref 2.5–4.5)
PLATELET # BLD: 316 E9/L (ref 130–450)
PMV BLD AUTO: 10.1 FL (ref 7–12)
POTASSIUM SERPL-SCNC: 4.5 MMOL/L (ref 3.5–5)
RBC # BLD: 4.36 E12/L (ref 3.8–5.8)
SODIUM BLD-SCNC: 144 MMOL/L (ref 132–146)
TOTAL PROTEIN: 7.1 G/DL (ref 6.4–8.3)
TRIGL SERPL-MCNC: 135 MG/DL (ref 0–149)
TSH SERPL DL<=0.05 MIU/L-ACNC: 0.68 UIU/ML (ref 0.27–4.2)
URIC ACID, SERUM: 5.4 MG/DL (ref 3.4–7)
VITAMIN D 25-HYDROXY: 38 NG/ML (ref 30–100)
VLDLC SERPL CALC-MCNC: 27 MG/DL
WBC # BLD: 10.6 E9/L (ref 4.5–11.5)

## 2022-08-02 PROCEDURE — 85025 COMPLETE CBC W/AUTO DIFF WBC: CPT

## 2022-08-02 PROCEDURE — 84550 ASSAY OF BLOOD/URIC ACID: CPT

## 2022-08-02 PROCEDURE — 80053 COMPREHEN METABOLIC PANEL: CPT

## 2022-08-02 PROCEDURE — 80061 LIPID PANEL: CPT

## 2022-08-02 PROCEDURE — 36415 COLL VENOUS BLD VENIPUNCTURE: CPT

## 2022-08-02 PROCEDURE — 84100 ASSAY OF PHOSPHORUS: CPT

## 2022-08-02 PROCEDURE — 84443 ASSAY THYROID STIM HORMONE: CPT

## 2022-08-02 PROCEDURE — 83735 ASSAY OF MAGNESIUM: CPT

## 2022-08-02 PROCEDURE — 69209 REMOVE IMPACTED EAR WAX UNI: CPT | Performed by: FAMILY MEDICINE

## 2022-08-02 PROCEDURE — 82306 VITAMIN D 25 HYDROXY: CPT

## 2022-08-02 PROCEDURE — 83036 HEMOGLOBIN GLYCOSYLATED A1C: CPT

## 2022-08-02 PROCEDURE — 83970 ASSAY OF PARATHORMONE: CPT

## 2022-08-02 PROCEDURE — 82044 UR ALBUMIN SEMIQUANTITATIVE: CPT

## 2022-08-02 PROCEDURE — 1123F ACP DISCUSS/DSCN MKR DOCD: CPT | Performed by: FAMILY MEDICINE

## 2022-08-02 PROCEDURE — 82570 ASSAY OF URINE CREATININE: CPT

## 2022-08-02 PROCEDURE — 99205 OFFICE O/P NEW HI 60 MIN: CPT | Performed by: FAMILY MEDICINE

## 2022-08-02 RX ORDER — ATORVASTATIN CALCIUM 40 MG/1
40 TABLET, FILM COATED ORAL DAILY
COMMUNITY
Start: 2022-07-07 | End: 2022-10-04 | Stop reason: SDUPTHER

## 2022-08-02 RX ORDER — LISINOPRIL 40 MG/1
40 TABLET ORAL DAILY
COMMUNITY
Start: 2022-07-07 | End: 2022-09-20 | Stop reason: ALTCHOICE

## 2022-08-02 ASSESSMENT — ENCOUNTER SYMPTOMS
DIARRHEA: 0
CONSTIPATION: 0
SINUS PRESSURE: 0
RHINORRHEA: 1
VOMITING: 0
SHORTNESS OF BREATH: 1
WHEEZING: 1
SINUS PAIN: 0
COUGH: 1
TROUBLE SWALLOWING: 0
APNEA: 1
NAUSEA: 0
ABDOMINAL PAIN: 0
VOICE CHANGE: 0

## 2022-08-02 ASSESSMENT — PATIENT HEALTH QUESTIONNAIRE - PHQ9
SUM OF ALL RESPONSES TO PHQ9 QUESTIONS 1 & 2: 0
SUM OF ALL RESPONSES TO PHQ QUESTIONS 1-9: 0
SUM OF ALL RESPONSES TO PHQ QUESTIONS 1-9: 0
2. FEELING DOWN, DEPRESSED OR HOPELESS: 0
SUM OF ALL RESPONSES TO PHQ QUESTIONS 1-9: 0
SUM OF ALL RESPONSES TO PHQ QUESTIONS 1-9: 0
1. LITTLE INTEREST OR PLEASURE IN DOING THINGS: 0
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
## 2022-08-03 PROBLEM — F10.20 ALCOHOLISM (HCC): Status: ACTIVE | Noted: 2022-08-03

## 2022-08-03 PROBLEM — N18.31 STAGE 3A CHRONIC KIDNEY DISEASE (HCC): Status: ACTIVE | Noted: 2022-08-03

## 2022-08-09 ENCOUNTER — TELEPHONE (OUTPATIENT)
Dept: ADMINISTRATIVE | Age: 77
End: 2022-08-09

## 2022-08-21 SDOH — HEALTH STABILITY: PHYSICAL HEALTH: ON AVERAGE, HOW MANY MINUTES DO YOU ENGAGE IN EXERCISE AT THIS LEVEL?: 10 MIN

## 2022-08-21 SDOH — HEALTH STABILITY: PHYSICAL HEALTH: ON AVERAGE, HOW MANY DAYS PER WEEK DO YOU ENGAGE IN MODERATE TO STRENUOUS EXERCISE (LIKE A BRISK WALK)?: 0 DAYS

## 2022-08-22 ENCOUNTER — OFFICE VISIT (OUTPATIENT)
Dept: ORTHOPEDIC SURGERY | Age: 77
End: 2022-08-22
Payer: MEDICARE

## 2022-08-22 VITALS — WEIGHT: 280 LBS | HEIGHT: 71 IN | BODY MASS INDEX: 39.2 KG/M2

## 2022-08-22 DIAGNOSIS — M17.12 PRIMARY OSTEOARTHRITIS OF LEFT KNEE: Primary | ICD-10-CM

## 2022-08-22 PROCEDURE — 99203 OFFICE O/P NEW LOW 30 MIN: CPT | Performed by: NURSE PRACTITIONER

## 2022-08-22 PROCEDURE — 20610 DRAIN/INJ JOINT/BURSA W/O US: CPT | Performed by: NURSE PRACTITIONER

## 2022-08-22 PROCEDURE — 1123F ACP DISCUSS/DSCN MKR DOCD: CPT | Performed by: NURSE PRACTITIONER

## 2022-08-22 RX ORDER — TRIAMCINOLONE ACETONIDE 40 MG/ML
40 INJECTION, SUSPENSION INTRA-ARTICULAR; INTRAMUSCULAR ONCE
Status: COMPLETED | OUTPATIENT
Start: 2022-08-22 | End: 2022-08-22

## 2022-08-22 RX ORDER — LIDOCAINE HYDROCHLORIDE 10 MG/ML
4 INJECTION, SOLUTION INFILTRATION; PERINEURAL ONCE
Status: COMPLETED | OUTPATIENT
Start: 2022-08-22 | End: 2022-08-22

## 2022-08-22 RX ADMIN — TRIAMCINOLONE ACETONIDE 40 MG: 40 INJECTION, SUSPENSION INTRA-ARTICULAR; INTRAMUSCULAR at 11:18

## 2022-08-22 RX ADMIN — LIDOCAINE HYDROCHLORIDE 4 ML: 10 INJECTION, SOLUTION INFILTRATION; PERINEURAL at 11:18
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
## 2022-08-25 ENCOUNTER — OFFICE VISIT (OUTPATIENT)
Dept: CARDIOLOGY CLINIC | Age: 77
End: 2022-08-25
Payer: MEDICARE

## 2022-08-25 VITALS
HEART RATE: 132 BPM | RESPIRATION RATE: 20 BRPM | BODY MASS INDEX: 40.61 KG/M2 | HEIGHT: 71 IN | DIASTOLIC BLOOD PRESSURE: 80 MMHG | WEIGHT: 290.1 LBS | SYSTOLIC BLOOD PRESSURE: 118 MMHG

## 2022-08-25 DIAGNOSIS — I48.91 ATRIAL FIBRILLATION, UNSPECIFIED TYPE (HCC): ICD-10-CM

## 2022-08-25 DIAGNOSIS — I25.10 CORONARY ARTERY DISEASE INVOLVING NATIVE CORONARY ARTERY OF NATIVE HEART, UNSPECIFIED WHETHER ANGINA PRESENT: Primary | ICD-10-CM

## 2022-08-25 DIAGNOSIS — I50.9 ACUTE ON CHRONIC CONGESTIVE HEART FAILURE, UNSPECIFIED HEART FAILURE TYPE (HCC): ICD-10-CM

## 2022-08-25 LAB — PRO-BNP: 1875 PG/ML (ref 0–450)

## 2022-08-25 PROCEDURE — 99205 OFFICE O/P NEW HI 60 MIN: CPT | Performed by: INTERNAL MEDICINE

## 2022-08-25 PROCEDURE — 36415 COLL VENOUS BLD VENIPUNCTURE: CPT | Performed by: INTERNAL MEDICINE

## 2022-08-25 PROCEDURE — 93000 ELECTROCARDIOGRAM COMPLETE: CPT | Performed by: INTERNAL MEDICINE

## 2022-08-25 RX ORDER — FUROSEMIDE 40 MG/1
40 TABLET ORAL 2 TIMES DAILY
Qty: 30 TABLET | Refills: 3 | Status: SHIPPED
Start: 2022-08-25 | End: 2022-08-30 | Stop reason: SDUPTHER

## 2022-08-25 RX ORDER — METOPROLOL SUCCINATE 25 MG/1
25 TABLET, EXTENDED RELEASE ORAL DAILY
Qty: 30 TABLET | Refills: 3 | Status: SHIPPED
Start: 2022-08-25 | End: 2022-08-30 | Stop reason: SDUPTHER

## 2022-08-25 NOTE — PROGRESS NOTES
Lab work drawn from LEFT  arm. Patient tolerated  procedure well.     LABS: BNP    Alistair Madrigal MA
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
## 2022-08-30 ENCOUNTER — OFFICE VISIT (OUTPATIENT)
Dept: CARDIOLOGY CLINIC | Age: 77
End: 2022-08-30
Payer: MEDICARE

## 2022-08-30 VITALS
RESPIRATION RATE: 18 BRPM | HEART RATE: 83 BPM | DIASTOLIC BLOOD PRESSURE: 70 MMHG | HEIGHT: 71 IN | SYSTOLIC BLOOD PRESSURE: 110 MMHG | BODY MASS INDEX: 39.48 KG/M2 | WEIGHT: 282 LBS

## 2022-08-30 DIAGNOSIS — I48.92 ATRIAL FLUTTER, UNSPECIFIED TYPE (HCC): ICD-10-CM

## 2022-08-30 DIAGNOSIS — I50.31 ACUTE DIASTOLIC (CONGESTIVE) HEART FAILURE (HCC): ICD-10-CM

## 2022-08-30 DIAGNOSIS — I25.10 CORONARY ARTERY DISEASE INVOLVING NATIVE CORONARY ARTERY OF NATIVE HEART, UNSPECIFIED WHETHER ANGINA PRESENT: Primary | ICD-10-CM

## 2022-08-30 LAB
ANION GAP SERPL CALCULATED.3IONS-SCNC: 17 MMOL/L (ref 7–16)
BUN BLDV-MCNC: 63 MG/DL (ref 6–23)
CALCIUM SERPL-MCNC: 9.7 MG/DL (ref 8.6–10.2)
CHLORIDE BLD-SCNC: 97 MMOL/L (ref 98–107)
CO2: 23 MMOL/L (ref 22–29)
CREAT SERPL-MCNC: 3.1 MG/DL (ref 0.7–1.2)
GFR AFRICAN AMERICAN: 24
GFR NON-AFRICAN AMERICAN: 20 ML/MIN/1.73
GLUCOSE BLD-MCNC: 108 MG/DL (ref 74–99)
POTASSIUM SERPL-SCNC: 5.2 MMOL/L (ref 3.5–5)
PRO-BNP: 1504 PG/ML (ref 0–450)
SODIUM BLD-SCNC: 137 MMOL/L (ref 132–146)

## 2022-08-30 PROCEDURE — 1123F ACP DISCUSS/DSCN MKR DOCD: CPT | Performed by: CLINICAL NURSE SPECIALIST

## 2022-08-30 PROCEDURE — 36415 COLL VENOUS BLD VENIPUNCTURE: CPT | Performed by: CLINICAL NURSE SPECIALIST

## 2022-08-30 PROCEDURE — 93000 ELECTROCARDIOGRAM COMPLETE: CPT | Performed by: INTERNAL MEDICINE

## 2022-08-30 PROCEDURE — 99214 OFFICE O/P EST MOD 30 MIN: CPT | Performed by: CLINICAL NURSE SPECIALIST

## 2022-08-30 RX ORDER — FUROSEMIDE 40 MG/1
40 TABLET ORAL 2 TIMES DAILY
Qty: 90 TABLET | Refills: 3 | Status: SHIPPED
Start: 2022-08-30 | End: 2022-09-08 | Stop reason: DRUGHIGH

## 2022-08-30 RX ORDER — METOPROLOL SUCCINATE 25 MG/1
25 TABLET, EXTENDED RELEASE ORAL DAILY
Qty: 90 TABLET | Refills: 3 | Status: SHIPPED | OUTPATIENT
Start: 2022-08-30

## 2022-08-30 NOTE — PROGRESS NOTES
Lab work drawn from left arm. Patient tolerated  procedure well.     LABS: BMP/BNP      Grisel Weems, [address]

## 2022-08-30 NOTE — PATIENT INSTRUCTIONS
- Weigh yourself daily    -Stay Hydrated, 64 oz of non caffeine water     -Diet should sodium restricted to 2 grams    -If at any time you feel that you are retaining fluid, your medications are not working, or you feel ill in anyway, then please call us for either same day appointment or the next day, and for instructions. Our goal is to keep you out of the emergency room and the hospital and we have ways to do it.     -If you become sick for other reasons, and notice that you are not urinating as much, the urine is very dark, you have significant diarrhea or vomiting, then please DO NOT take your water pill and CALL US immediately.

## 2022-08-31 ENCOUNTER — TELEPHONE (OUTPATIENT)
Dept: CARDIOLOGY | Age: 77
End: 2022-08-31

## 2022-08-31 DIAGNOSIS — I50.9 CHRONIC HEART FAILURE, UNSPECIFIED HEART FAILURE TYPE (HCC): Primary | ICD-10-CM

## 2022-08-31 DIAGNOSIS — N17.9 AKI (ACUTE KIDNEY INJURY) (HCC): ICD-10-CM

## 2022-08-31 NOTE — TELEPHONE ENCOUNTER
Message left for  Cal Ansari' regarding yesterday's lab results. He was instructed to hold Lisinopril and Lasix, and increase hydration with non caffeine containing beverages. Order placed for BMP on Friday, September 2; we will contact him with further instructions based on results.

## 2022-09-01 ENCOUNTER — TELEPHONE (OUTPATIENT)
Dept: CARDIOLOGY CLINIC | Age: 77
End: 2022-09-01

## 2022-09-01 NOTE — TELEPHONE ENCOUNTER
Contacted patient's son, Tara Briones, with recommendations from 8/31/2022 per Maudie Klinefelter. He verbalized understanding and patient will have labs drawn following echocardiogram in our office tomorrow. ----- Message from Sherry Smith MD sent at 9/1/2022 12:17 PM EDT -----  His proBNP in the office when I saw him was elevated, consistent with congestive heart failure as we discussed. On repeat blood work with Loretta Gonzales this week it had come down a little bit but his creatinine jumped, so we had recommended he hold his lisinopril and furosemide and get repeat labs tomorrow.

## 2022-09-01 NOTE — RESULT ENCOUNTER NOTE
His proBNP in the office when I saw him was elevated, consistent with congestive heart failure as we discussed. On repeat blood work with Tia Fisher this week it had come down a little bit but his creatinine jumped, so we had recommended he hold his lisinopril and furosemide and get repeat labs tomorrow.

## 2022-09-02 ENCOUNTER — HOSPITAL ENCOUNTER (OUTPATIENT)
Dept: CARDIOLOGY | Age: 77
Discharge: HOME | End: 2022-09-02
Payer: MEDICARE

## 2022-09-02 DIAGNOSIS — I50.9 ACUTE ON CHRONIC CONGESTIVE HEART FAILURE, UNSPECIFIED HEART FAILURE TYPE (HCC): ICD-10-CM

## 2022-09-02 LAB
LV EF: 53 %
LVEF MODALITY: NORMAL

## 2022-09-02 PROCEDURE — 6360000004 HC RX CONTRAST MEDICATION: Performed by: INTERNAL MEDICINE

## 2022-09-02 PROCEDURE — 2580000003 HC RX 258: Performed by: INTERNAL MEDICINE

## 2022-09-02 PROCEDURE — 93306 TTE W/DOPPLER COMPLETE: CPT

## 2022-09-02 RX ORDER — SODIUM CHLORIDE 0.9 % (FLUSH) 0.9 %
5-40 SYRINGE (ML) INJECTION 2 TIMES DAILY
Status: DISCONTINUED | OUTPATIENT
Start: 2022-09-02 | End: 2022-09-03 | Stop reason: HOSPADM

## 2022-09-02 RX ADMIN — PERFLUTREN 1.1 MG: 6.52 INJECTION, SUSPENSION INTRAVENOUS at 11:54

## 2022-09-02 RX ADMIN — SODIUM CHLORIDE, PRESERVATIVE FREE 10 ML: 5 INJECTION INTRAVENOUS at 11:56

## 2022-09-02 RX ADMIN — SODIUM CHLORIDE, PRESERVATIVE FREE 10 ML: 5 INJECTION INTRAVENOUS at 11:54

## 2022-09-06 ENCOUNTER — TELEPHONE (OUTPATIENT)
Dept: CARDIOLOGY CLINIC | Age: 77
End: 2022-09-06

## 2022-09-06 NOTE — TELEPHONE ENCOUNTER
Spoke with patient's daughter. She was given echo results and recommendations per Dr. Marjorie Salter. She verbalized understanding. Patient will have labs drawn today (he forgot on Friday). Office visit scheduled. ----- Message from Brandt Petersen MD sent at 9/5/2022  4:00 PM EDT -----  Echo showing low normal pumping function of heart, and mild-moderate narrowing of the aortic valve. Was still in atrial fib but rate not bad. Needs repeat blood work asap I don't see that it was done Friday as recommended. Please make sure has f/u with me or Valferny Lawrence in the next few weeks. ER if not feeling well.

## 2022-09-07 ENCOUNTER — NURSE ONLY (OUTPATIENT)
Dept: FAMILY MEDICINE CLINIC | Age: 77
End: 2022-09-07
Payer: MEDICARE

## 2022-09-07 DIAGNOSIS — Z79.01 CHRONIC ANTICOAGULATION: Primary | ICD-10-CM

## 2022-09-07 DIAGNOSIS — I50.9 CHRONIC CONGESTIVE HEART FAILURE, UNSPECIFIED HEART FAILURE TYPE (HCC): ICD-10-CM

## 2022-09-07 DIAGNOSIS — N17.9 AKI (ACUTE KIDNEY INJURY) (HCC): ICD-10-CM

## 2022-09-07 DIAGNOSIS — N17.9 ACUTE KIDNEY INJURY (NONTRAUMATIC) (HCC): ICD-10-CM

## 2022-09-07 DIAGNOSIS — I50.9 CHRONIC HEART FAILURE, UNSPECIFIED HEART FAILURE TYPE (HCC): ICD-10-CM

## 2022-09-07 LAB
ANION GAP SERPL CALCULATED.3IONS-SCNC: 9 MMOL/L (ref 7–16)
BUN BLDV-MCNC: 40 MG/DL (ref 6–23)
CALCIUM SERPL-MCNC: 9.9 MG/DL (ref 8.6–10.2)
CHLORIDE BLD-SCNC: 104 MMOL/L (ref 98–107)
CO2: 27 MMOL/L (ref 22–29)
CREAT SERPL-MCNC: 1.7 MG/DL (ref 0.7–1.2)
GFR AFRICAN AMERICAN: 47
GFR NON-AFRICAN AMERICAN: 39 ML/MIN/1.73
GLUCOSE BLD-MCNC: 125 MG/DL (ref 74–99)
POTASSIUM SERPL-SCNC: 5.9 MMOL/L (ref 3.5–5)
SODIUM BLD-SCNC: 140 MMOL/L (ref 132–146)

## 2022-09-07 PROCEDURE — 36415 COLL VENOUS BLD VENIPUNCTURE: CPT | Performed by: FAMILY MEDICINE

## 2022-09-07 SDOH — ECONOMIC STABILITY: FOOD INSECURITY: WITHIN THE PAST 12 MONTHS, YOU WORRIED THAT YOUR FOOD WOULD RUN OUT BEFORE YOU GOT MONEY TO BUY MORE.: NEVER TRUE

## 2022-09-07 SDOH — ECONOMIC STABILITY: FOOD INSECURITY: WITHIN THE PAST 12 MONTHS, THE FOOD YOU BOUGHT JUST DIDN'T LAST AND YOU DIDN'T HAVE MONEY TO GET MORE.: NEVER TRUE

## 2022-09-07 ASSESSMENT — SOCIAL DETERMINANTS OF HEALTH (SDOH): HOW HARD IS IT FOR YOU TO PAY FOR THE VERY BASICS LIKE FOOD, HOUSING, MEDICAL CARE, AND HEATING?: NOT HARD AT ALL

## 2022-09-08 ENCOUNTER — TELEPHONE (OUTPATIENT)
Dept: CARDIOLOGY CLINIC | Age: 77
End: 2022-09-08

## 2022-09-08 DIAGNOSIS — I50.9 CHRONIC HEART FAILURE, UNSPECIFIED HEART FAILURE TYPE (HCC): Primary | ICD-10-CM

## 2022-09-08 RX ORDER — FUROSEMIDE 40 MG/1
40 TABLET ORAL DAILY
Qty: 90 TABLET | Refills: 1 | Status: SHIPPED | OUTPATIENT
Start: 2022-09-08

## 2022-09-08 NOTE — TELEPHONE ENCOUNTER
----- Message from WALI Lin - CNP sent at 9/8/2022 12:41 PM EDT -----  Covering labs for Marky KEYES on PTO  Improved renal function however elevated potassium  Please ensure that he is monitoring potassium in his diet     Lasix and lisinopril have been on hold since 9/1/2022  How is he feeling    Restart lasix at 40 mg daily (previously BID)  Follow up labs Monday (please CC Marky Ibarra)    Thank you

## 2022-09-08 NOTE — RESULT ENCOUNTER NOTE
Covering labs for [facility] APRN on PTO  Improved renal function however elevated potassium  Please ensure that he is monitoring potassium in his diet     Lasix and lisinopril have been on hold since 9/1/2022  How is he feeling    Restart lasix at 40 mg daily (previously BID)  Follow up labs Monday (please CC [facility])    Thank you

## 2022-09-13 ENCOUNTER — NURSE ONLY (OUTPATIENT)
Dept: FAMILY MEDICINE CLINIC | Age: 77
End: 2022-09-13
Payer: MEDICARE

## 2022-09-13 DIAGNOSIS — I10 HYPERTENSION, UNSPECIFIED TYPE: Primary | ICD-10-CM

## 2022-09-13 DIAGNOSIS — I50.9 CHRONIC HEART FAILURE, UNSPECIFIED HEART FAILURE TYPE (HCC): ICD-10-CM

## 2022-09-13 LAB
ANION GAP SERPL CALCULATED.3IONS-SCNC: 14 MMOL/L (ref 7–16)
BUN BLDV-MCNC: 49 MG/DL (ref 6–23)
CALCIUM SERPL-MCNC: 9.9 MG/DL (ref 8.6–10.2)
CHLORIDE BLD-SCNC: 101 MMOL/L (ref 98–107)
CO2: 23 MMOL/L (ref 22–29)
CREAT SERPL-MCNC: 1.7 MG/DL (ref 0.7–1.2)
GFR AFRICAN AMERICAN: 47
GFR NON-AFRICAN AMERICAN: 39 ML/MIN/1.73
GLUCOSE BLD-MCNC: 130 MG/DL (ref 74–99)
POTASSIUM SERPL-SCNC: 4.7 MMOL/L (ref 3.5–5)
PRO-BNP: 1036 PG/ML (ref 0–450)
SODIUM BLD-SCNC: 138 MMOL/L (ref 132–146)

## 2022-09-13 PROCEDURE — 36415 COLL VENOUS BLD VENIPUNCTURE: CPT | Performed by: FAMILY MEDICINE

## 2022-09-20 ENCOUNTER — OFFICE VISIT (OUTPATIENT)
Dept: CARDIOLOGY CLINIC | Age: 77
End: 2022-09-20
Payer: MEDICARE

## 2022-09-20 VITALS
HEART RATE: 90 BPM | SYSTOLIC BLOOD PRESSURE: 122 MMHG | DIASTOLIC BLOOD PRESSURE: 60 MMHG | HEIGHT: 71 IN | BODY MASS INDEX: 38.96 KG/M2 | WEIGHT: 278.3 LBS | RESPIRATION RATE: 18 BRPM

## 2022-09-20 DIAGNOSIS — I25.10 CORONARY ARTERY DISEASE INVOLVING NATIVE CORONARY ARTERY OF NATIVE HEART, UNSPECIFIED WHETHER ANGINA PRESENT: Primary | ICD-10-CM

## 2022-09-20 DIAGNOSIS — I50.32 CHRONIC HEART FAILURE WITH PRESERVED EJECTION FRACTION (HCC): ICD-10-CM

## 2022-09-20 LAB
ANION GAP SERPL CALCULATED.3IONS-SCNC: 11 MMOL/L (ref 7–16)
BUN BLDV-MCNC: 26 MG/DL (ref 6–23)
CALCIUM SERPL-MCNC: 9.9 MG/DL (ref 8.6–10.2)
CHLORIDE BLD-SCNC: 102 MMOL/L (ref 98–107)
CO2: 25 MMOL/L (ref 22–29)
CREAT SERPL-MCNC: 1.6 MG/DL (ref 0.7–1.2)
GFR AFRICAN AMERICAN: 51
GFR NON-AFRICAN AMERICAN: 42 ML/MIN/1.73
GLUCOSE BLD-MCNC: 123 MG/DL (ref 74–99)
POTASSIUM SERPL-SCNC: 4.9 MMOL/L (ref 3.5–5)
PRO-BNP: 1503 PG/ML (ref 0–450)
SODIUM BLD-SCNC: 138 MMOL/L (ref 132–146)

## 2022-09-20 PROCEDURE — 1123F ACP DISCUSS/DSCN MKR DOCD: CPT | Performed by: CLINICAL NURSE SPECIALIST

## 2022-09-20 PROCEDURE — 99215 OFFICE O/P EST HI 40 MIN: CPT | Performed by: CLINICAL NURSE SPECIALIST

## 2022-09-20 PROCEDURE — 36415 COLL VENOUS BLD VENIPUNCTURE: CPT | Performed by: CLINICAL NURSE SPECIALIST

## 2022-09-20 PROCEDURE — 93000 ELECTROCARDIOGRAM COMPLETE: CPT | Performed by: INTERNAL MEDICINE
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
## 2022-09-20 NOTE — PROGRESS NOTES
Performed venipuncture in right AC. 1 stick with a 23 gauge needle. Patient tolerated venipuncture well.     Sanjeev Elena, NATTY

## 2022-09-20 NOTE — PATIENT INSTRUCTIONS
Continue Lasix (furosemide) at 40 mg daily -- water pill     Continue Eliquis (apixaban)5 mg twice daily -- blood thinner     Continue Aspirin 81 mg daily -- for coronary disease     Continue Lipitor (atorvastatin) 40 mg daily -- for cholesterol    Resume Toprol XL (metoprolol succinate) 25 mg daily -- for heart rate control       -Weigh yourself daily    -Stay Hydrated, 64 oz     -Diet should sodium restricted to 2 grams    -Again watch your daily weight trends and if you gain water weight please follow below instructions.    -If you gain 3-5 pounds in 2-3 days OR notice that you are retaining fluid in anyway just like you did before then take an extra dose of your water pill (furosemide/Lasix) every day until you lose the weight or feel better.     -If you notice that you have taken more than 2 extra doses in 1 week then please call and let us know. -If at any time you feel that you are retaining fluid, your medications are not working, or you feel ill in anyway, then please call us for either same day appointment or the next day, and for instructions. Our goal is to keep you out of the emergency room and the hospital and we have ways to do it. You just need to call us in a timely manner.     -If you become sick for other reasons, and notice that you are not urinating as much, the urine is very dark, you have significant diarrhea or vomiting, then please DO NOT take your water pill and CALL US immediately.

## 2022-09-23 ENCOUNTER — TELEPHONE (OUTPATIENT)
Dept: CARDIOLOGY CLINIC | Age: 77
End: 2022-09-23

## 2022-09-23 NOTE — TELEPHONE ENCOUNTER
Contacted patient's daughter with lab results per Luz Quezada. She verbalized understanding.      ----- Message from WALI Streeter sent at 9/23/2022  2:36 PM EDT -----  Hi,     Can you please let . Sriram Mac or his son, Bjorn Johnston, know that his renal function is stable. His BNP is up slightly from last week, but still better than it was on his initial visit. He can continue the current medications and follow with us as scheduled for the stress test.     Thank you!

## 2022-09-29 ENCOUNTER — PATIENT MESSAGE (OUTPATIENT)
Dept: FAMILY MEDICINE CLINIC | Age: 77
End: 2022-09-29

## 2022-09-29 NOTE — TELEPHONE ENCOUNTER
From: Concepcion Gudino  To: Dr. Aristides Guerrero: 9/29/2022 2:10 AM EDT  Subject: Compression socks    This message is being sent by Kenia Ngo on behalf of [address] [place] [address]. I was wondering I need compression socks and I have the numbers for them given to me from the Lymphedema place I go to.    20/30 mmHg  Ankle 25cm  Calf 43cm    My insurance says they are 100% covered and I called the place to order but to get them I need a prescription sent to them for the socks. Was wondering if you can send it to them. Company:  [facility] [employer] [facility] [employer]  [phone]  [address] [place] [address], [address] [place] [address]    If you need anything else please call my daughter Elena Rivers for answer, her phone number is [phone].     Thanks

## 2022-10-04 ENCOUNTER — TELEPHONE (OUTPATIENT)
Dept: CARDIOLOGY | Age: 77
End: 2022-10-04

## 2022-10-04 RX ORDER — ATORVASTATIN CALCIUM 40 MG/1
40 TABLET, FILM COATED ORAL DAILY
Qty: 90 TABLET | Refills: 0 | Status: SHIPPED | OUTPATIENT
Start: 2022-10-04

## 2022-10-04 NOTE — TELEPHONE ENCOUNTER
Patients daughter called again in regards to the compression stockings    I pended a script that just needs signed - if agreeable. This will need to be faxed to [phone]    Also pended a script for his atorvastatin as he will run out before being seen. He will bring all other meds in bottles at the time of his visit.

## 2022-10-04 NOTE — TELEPHONE ENCOUNTER
Left message on voice mail to remind patient of pharmacological stress test appointment on October 5, 2022 at 0800. Instructions for test,including holding amaryl morning of test and bringing pro air inhaler to appointment, and COVID-19 preprocedure information left on voice mail. Asked patient to call with any questions or if unable to keep appointment.

## 2022-10-05 ENCOUNTER — HOSPITAL ENCOUNTER (OUTPATIENT)
Dept: CARDIOLOGY | Age: 77
Discharge: HOME | End: 2022-10-05
Payer: MEDICARE

## 2022-10-05 VITALS — HEIGHT: 71 IN | WEIGHT: 278 LBS | BODY MASS INDEX: 38.92 KG/M2

## 2022-10-05 DIAGNOSIS — I25.10 CORONARY ARTERY DISEASE INVOLVING NATIVE CORONARY ARTERY OF NATIVE HEART, UNSPECIFIED WHETHER ANGINA PRESENT: ICD-10-CM

## 2022-10-05 DIAGNOSIS — I50.32 CHRONIC HEART FAILURE WITH PRESERVED EJECTION FRACTION (HCC): ICD-10-CM

## 2022-10-05 DIAGNOSIS — I25.10 CORONARY ARTERY DISEASE INVOLVING NATIVE HEART, UNSPECIFIED VESSEL OR LESION TYPE, UNSPECIFIED WHETHER ANGINA PRESENT: Primary | ICD-10-CM

## 2022-10-05 PROCEDURE — A9500 TC99M SESTAMIBI: HCPCS | Performed by: INTERNAL MEDICINE

## 2022-10-05 PROCEDURE — 6360000002 HC RX W HCPCS: Performed by: CLINICAL NURSE SPECIALIST

## 2022-10-05 PROCEDURE — 3430000000 HC RX DIAGNOSTIC RADIOPHARMACEUTICAL: Performed by: INTERNAL MEDICINE

## 2022-10-05 PROCEDURE — 78452 HT MUSCLE IMAGE SPECT MULT: CPT

## 2022-10-05 PROCEDURE — 2580000003 HC RX 258: Performed by: INTERNAL MEDICINE

## 2022-10-05 PROCEDURE — 93017 CV STRESS TEST TRACING ONLY: CPT

## 2022-10-05 RX ORDER — SODIUM CHLORIDE 0.9 % (FLUSH) 0.9 %
10 SYRINGE (ML) INJECTION PRN
Status: DISCONTINUED | OUTPATIENT
Start: 2022-10-05 | End: 2022-10-06 | Stop reason: HOSPADM

## 2022-10-05 RX ADMIN — Medication 9.6 MILLICURIE: at 08:26

## 2022-10-05 RX ADMIN — SODIUM CHLORIDE, PRESERVATIVE FREE 10 ML: 5 INJECTION INTRAVENOUS at 10:07

## 2022-10-05 RX ADMIN — SODIUM CHLORIDE, PRESERVATIVE FREE 10 ML: 5 INJECTION INTRAVENOUS at 10:08

## 2022-10-05 RX ADMIN — SODIUM CHLORIDE, PRESERVATIVE FREE 10 ML: 5 INJECTION INTRAVENOUS at 08:26

## 2022-10-05 RX ADMIN — REGADENOSON 0.4 MG: 0.08 INJECTION, SOLUTION INTRAVENOUS at 10:08

## 2022-10-05 RX ADMIN — Medication 32.9 MILLICURIE: at 10:08

## 2022-10-05 NOTE — PROCEDURES
[address] [address] [phone]                Pharmacologic Stress Nuclear Gated SPECT Study    Name: [address] Account Number: [de-identified]    :  1945          Sex: male         Date of Study:  10/5/2022    Height: 5' 11\" (180.3 cm)         Weight: 278 lb (126.1 kg)     Ordering Provider: WALI Smith          PCP: Ana Luisa Simpson MD      Cardiologist: Alejandrina Jenkins MD             Interpreting Physician: Brandy Gil  _________________________________________________________________________________    Indication:   Evaluation of extent and severity of coronary artery disease    Clinical History:   Patient has prior history of coronary artery disease. Resting ECG:    Atrial fibrillation, 100 bpm.  Normal axis. Low voltage in limb leads. Nonspecific ST-T waves. Procedure:   Pharmacologic stress testing was performed with regadenoson 0.4 mg for 15 seconds. The heart rate was 100 at baseline and trudy to 133 beats during the infusion. This corresponds to 94% of maximum predicted heart rate. The blood pressure at baseline was 104/70 and blood pressure at the end of infusion was 104/72. Blood pressure response was normal during the stress procedure. The patient tolerated the infusion well. ECG during the infusion did not change. IMAGING: Myocardial perfusion imaging was performed at rest 30-35 minutes following the intravenous injection of 9.6 mCi of (Tc-Sestamibi) followed by 10 ml of Normal Saline. As per infusion protocol, the patient was injected intravenously with 32.9 mCi of (Tc-Sestamibi) followed by 10 ml of Normal Saline. Gated post-stress tomographic imaging was performed 20-25 minutes after stress. FINDINGS: The overall quality of the study was good.      Left ventricular cavity size was noted to be normal.    Rotational analog analysis demonstrated soft tissue diaphragmatic attenuation. The gated SPECT stress imaging in the short, vertical long, and horizontal long axis demonstrated     A mild defect was present in the mid inferior and apical inferior wall(s) that was  moderate sized by quantification. The resting images show no change. Gated SPECT left ventricular ejection fraction was calculated to be 54%, with normal myocardial thickening and wall motion. Impression:    ECG during the infusion did not change. The myocardial perfusion imaging was normal with attenuation artifact. Overall left ventricular systolic function was normal without regional wall motion abnormalities. Low risk general pharmacologic stress test.    Thank you for sending your patient to this [facility].      Electronically signed by Payal Quintana DO on 10/5/22 at 3:40 PM EDT

## 2022-10-05 NOTE — DISCHARGE INSTRUCTIONS
[address]      Instructions to Patients    The following are the instructions for patients who have had a procedure in our office today. Patient name: Ale Nixon    Radionuclide Activity: 40mCi of 99mTc-Sestamibi    Date Administered: 10/5/2022    Expires: 48 hours after scheduled appointment time      Patient may resume normal activity unless otherwise instructed. Patient may resume medications as normal.  If the need should arise, patient may call [phone] between the hours of 8:00am-5:00pm.  After hours there is at least one physician on-call at all times for those patients needing assistance. Patients may call [phone] and the answering service will direct the patient to one of our physicians for assistance. After the patient's test if they are going to be leaving from an airport in the near future they should take this letter with them to verify the test and radionuclide used for their test.      This letter verifies that the above named bearer received an injection of a radionuclide for medical purpose/usage only.         Electronically signed by Queen Geoff on 10/5/2022 at 9:47 AM

## 2022-10-06 ENCOUNTER — TELEPHONE (OUTPATIENT)
Dept: CARDIOLOGY CLINIC | Age: 77
End: 2022-10-06

## 2022-10-06 NOTE — TELEPHONE ENCOUNTER
Contacted patient's daughter, Ramiro Salazar, with stress results. She verbalized understanding. Patient scheduled. ----- Message from WALI Botello sent at 10/6/2022  7:47 AM EDT -----  Good morning! Can you please let Mr. Lula Essex (or his son or daughter) know that his stress test was low risk, with no evidence of blockages. He should follow up with us as scheduled.      Thank you!   ----- Message -----  From: Cecilia Morejon  Sent: 10/6/2022   6:52 AM EDT  To: WALI Botello

## 2022-10-24 ENCOUNTER — OFFICE VISIT (OUTPATIENT)
Dept: FAMILY MEDICINE CLINIC | Age: 77
End: 2022-10-24
Payer: MEDICARE

## 2022-10-24 VITALS
BODY MASS INDEX: 38.22 KG/M2 | RESPIRATION RATE: 16 BRPM | OXYGEN SATURATION: 96 % | SYSTOLIC BLOOD PRESSURE: 110 MMHG | DIASTOLIC BLOOD PRESSURE: 74 MMHG | HEART RATE: 74 BPM | TEMPERATURE: 98.3 F | HEIGHT: 71 IN | WEIGHT: 273 LBS

## 2022-10-24 DIAGNOSIS — Z71.85 IMMUNIZATION COUNSELING: ICD-10-CM

## 2022-10-24 DIAGNOSIS — I50.32 CHRONIC HEART FAILURE WITH PRESERVED EJECTION FRACTION (HCC): ICD-10-CM

## 2022-10-24 DIAGNOSIS — E11.29 TYPE 2 DIABETES MELLITUS WITH MICROALBUMINURIA, WITHOUT LONG-TERM CURRENT USE OF INSULIN (HCC): ICD-10-CM

## 2022-10-24 DIAGNOSIS — M17.12 PRIMARY OSTEOARTHRITIS OF LEFT KNEE: ICD-10-CM

## 2022-10-24 DIAGNOSIS — G89.29 HIP PAIN, CHRONIC, LEFT: ICD-10-CM

## 2022-10-24 DIAGNOSIS — G47.33 OBSTRUCTIVE SLEEP APNEA SYNDROME: ICD-10-CM

## 2022-10-24 DIAGNOSIS — N18.31 STAGE 3A CHRONIC KIDNEY DISEASE (HCC): ICD-10-CM

## 2022-10-24 DIAGNOSIS — I48.0 PAROXYSMAL ATRIAL FIBRILLATION (HCC): ICD-10-CM

## 2022-10-24 DIAGNOSIS — M54.50 CHRONIC BILATERAL LOW BACK PAIN WITHOUT SCIATICA: ICD-10-CM

## 2022-10-24 DIAGNOSIS — R80.9 TYPE 2 DIABETES MELLITUS WITH MICROALBUMINURIA, WITHOUT LONG-TERM CURRENT USE OF INSULIN (HCC): ICD-10-CM

## 2022-10-24 DIAGNOSIS — G89.29 CHRONIC BILATERAL LOW BACK PAIN WITHOUT SCIATICA: ICD-10-CM

## 2022-10-24 DIAGNOSIS — M25.552 HIP PAIN, CHRONIC, LEFT: ICD-10-CM

## 2022-10-24 DIAGNOSIS — F10.20 ALCOHOLISM (HCC): ICD-10-CM

## 2022-10-24 DIAGNOSIS — I10 PRIMARY HYPERTENSION: Primary | ICD-10-CM

## 2022-10-24 PROCEDURE — 3044F HG A1C LEVEL LT 7.0%: CPT | Performed by: FAMILY MEDICINE

## 2022-10-24 PROCEDURE — 3078F DIAST BP <80 MM HG: CPT | Performed by: FAMILY MEDICINE

## 2022-10-24 PROCEDURE — 3074F SYST BP LT 130 MM HG: CPT | Performed by: FAMILY MEDICINE

## 2022-10-24 PROCEDURE — 1123F ACP DISCUSS/DSCN MKR DOCD: CPT | Performed by: FAMILY MEDICINE

## 2022-10-24 PROCEDURE — 99214 OFFICE O/P EST MOD 30 MIN: CPT | Performed by: FAMILY MEDICINE

## 2022-10-24 RX ORDER — GABAPENTIN 100 MG/1
100 CAPSULE ORAL 3 TIMES DAILY
Qty: 270 CAPSULE | Refills: 1 | Status: SHIPPED | OUTPATIENT
Start: 2022-10-24 | End: 2023-04-22

## 2022-10-24 ASSESSMENT — ENCOUNTER SYMPTOMS
RHINORRHEA: 1
NAUSEA: 0
SINUS PRESSURE: 0
SHORTNESS OF BREATH: 1
ABDOMINAL PAIN: 0
TROUBLE SWALLOWING: 0
SINUS PAIN: 0
DIARRHEA: 0
VOMITING: 0
WHEEZING: 1
VOICE CHANGE: 0
COUGH: 1
CONSTIPATION: 0
APNEA: 1

## 2022-10-24 ASSESSMENT — LIFESTYLE VARIABLES
HOW OFTEN DO YOU HAVE A DRINK CONTAINING ALCOHOL: NEVER
HOW MANY STANDARD DRINKS CONTAINING ALCOHOL DO YOU HAVE ON A TYPICAL DAY: PATIENT DOES NOT DRINK
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
## 2022-10-25 ENCOUNTER — TELEMEDICINE (OUTPATIENT)
Dept: FAMILY MEDICINE CLINIC | Age: 77
End: 2022-10-25

## 2022-10-25 DIAGNOSIS — Z00.00 ENCOUNTER FOR SUBSEQUENT ANNUAL WELLNESS VISIT (AWV) IN MEDICARE PATIENT: Primary | ICD-10-CM

## 2022-10-25 PROCEDURE — 99999 PR OFFICE/OUTPT VISIT,PROCEDURE ONLY: CPT | Performed by: FAMILY MEDICINE

## 2022-10-25 ASSESSMENT — PATIENT HEALTH QUESTIONNAIRE - PHQ9
SUM OF ALL RESPONSES TO PHQ QUESTIONS 1-9: 0
SUM OF ALL RESPONSES TO PHQ QUESTIONS 1-9: 0
1. LITTLE INTEREST OR PLEASURE IN DOING THINGS: 0
SUM OF ALL RESPONSES TO PHQ QUESTIONS 1-9: 0
SUM OF ALL RESPONSES TO PHQ QUESTIONS 1-9: 0

## 2022-10-25 ASSESSMENT — LIFESTYLE VARIABLES
HOW OFTEN DO YOU HAVE A DRINK CONTAINING ALCOHOL: MONTHLY OR LESS
HOW MANY STANDARD DRINKS CONTAINING ALCOHOL DO YOU HAVE ON A TYPICAL DAY: PATIENT DOES NOT DRINK

## 2022-10-25 NOTE — PROGRESS NOTES
Medicare Annual Wellness Visit    Sybil Severance is here for Segundo Vaca [facility]    Assessment & Plan   Medicare annual wellness visit, subsequent  Initial Medicare annual wellness visit    Recommendations for Preventive Services Due: see orders and patient instructions/AVS.  Recommended screening schedule for the next 5-10 years is provided to the patient in written form: see Patient Instructions/AVS.     No follow-ups on file. Subjective   {OPTIONAL - WILL AUTO-DELETE IF NOT CATN:[id number]}    Patient's complete Health Risk Assessment and screening values have been reviewed and are found in Flowsheets. The following problems were reviewed today and where indicated follow up appointments were made and/or referrals ordered.     Positive Risk Factor Screenings with Interventions:    Fall Risk:  Do you feel unsteady or are you worried about falling? : (!) yes  2 or more falls in past year?: (!) yes  Fall with injury in past year?: no   Fall Risk Interventions:    {Medicare AWV Falls Risk Interventions:528342625}            General Health and ACP:  General  In general, how would you say your health is?: Fair  In the past 7 days, have you experienced any of the following: New or Increased Pain, New or Increased Fatigue, Loneliness, Social Isolation, Stress or Anger?: No  Do you get the social and emotional support that you need?: Yes  Do you have a Living Will?: (!) No    Advance Directives     Power of  Living Will ACP-Advance Directive ACP-Power of     Not on File Not on File Not on File Not on File        General Health Risk Interventions:  {Medicare AWV General Health Risk Interventions:221052779}    Health Habits/Nutrition:  Physical Activity: Inactive    Days of Exercise per Week: 0 days    Minutes of Exercise per Session: 0 min     Have you lost any weight without trying in the past 3 months?: (!) Yes     Have you seen the dentist within the past year?: (!) No  Health Habits/Nutrition Interventions:  {Medicare AWV Health Habits/Nutrition Interventions:[id number]}    Hearing/Vision:  Do you or your family notice any trouble with your hearing that hasn't been managed with hearing aids?: (!) Yes  Do you have difficulty driving, watching TV, or doing any of your daily activities because of your eyesight?: No  Have you had an eye exam within the past year?: (!) No  No results found. Hearing/Vision Interventions:  {Medicare AWV Hearing/Vision Interventions:332433976}    Safety:  Do you have working smoke detectors?: (!) No  Do you have any tripping hazards - loose or unsecured carpets or rugs?: No  Do you have any tripping hazards - clutter in doorways, halls, or stairs?: (!) Yes  Do you have either shower bars, grab bars, non-slip mats or non-slip surfaces in your shower or bathtub?: (!) No  Do all of your stairways have a railing or banister?: Yes  Do you always fasten your seatbelt when you are in a car?: Yes  Safety Interventions:  {Medicare AWV Safety Interventions:940430421}           Objective      Patient-Reported Vitals  No data recorded   {OPTIONAL - GENERAL PHYSICAL EXAM (WILL AUTO-DELETE IF NOT USED):[id number]}       No Known Allergies  Prior to Visit Medications    Medication Sig Taking? Authorizing Provider   gabapentin (NEURONTIN) 100 MG capsule Take 1 capsule by mouth 3 times daily for 180 days.  Yes Jenny Burns MD   Elastic Bandages & Supports ([facility]) [address] 2 each by Does not apply route daily Dispense one pair of compression stockings 20/30 mmHg  Ankle size 25cm  Calf size 43cm Yes Jenny Burns MD   atorvastatin (LIPITOR) 40 MG tablet Take 1 tablet by mouth daily Yes Jenny Burns MD   furosemide (LASIX) 40 MG tablet Take 1 tablet by mouth daily Yes WALI Ennis - CNP   metoprolol succinate (TOPROL XL) 25 MG extended release tablet Take 1 tablet by mouth daily Yes WALI Botello - CNS   apixaban (ELIQUIS) 5 MG TABS tablet Take 1 tablet by mouth 2 times daily Yes WALI Ovalles - CNS   TUDORZA PRESSAIR 400 MCG/ACT AEPB inhaler Inhale 1 puff into the lungs 2 times daily  Yes Historical Provider, MD   PROAIR  (90 BASE) MCG/ACT inhaler INHALE 2 PUFFS EVERY 4 HOURS AS NEEDED Yes Michel Justin MD   aspirin 81 MG tablet Take 81 mg by mouth daily. Yes Historical Provider, MD Arzateam (Including outside providers/suppliers regularly involved in providing care):   Patient Care Team:  Devorah Herrera MD as PCP - General (Family Medicine)  Devorah Herrera MD as PCP - [facility]     Reviewed and updated this visit:  Allergies  Meds           Devin Gudino, was evaluated through a synchronous (real-time) audio-video encounter. The patient (or guardian if applicable) is aware that this is a billable service, which includes applicable co-pays. This Virtual Visit was conducted with patient's (and/or legal guardian's) consent. The visit was conducted pursuant to the emergency declaration under the [address] [place] [address], [address] [place] [address] authority and the [employer] and [email or web address] General Act. Patient identification was verified, and a caregiver was present when appropriate. The patient was located at {[facility] POS - Patient:[id number]}. Provider was located at {[facility] POS - Provider:[id number]}.

## 2022-10-25 NOTE — PATIENT INSTRUCTIONS
Personalized Preventive Plan for Niko Cerda - 10/25/2022  Medicare offers a range of preventive health benefits. Some of the tests and screenings are paid in full while other may be subject to a deductible, co-insurance, and/or copay. Some of these benefits include a comprehensive review of your medical history including lifestyle, illnesses that may run in your family, and various assessments and screenings as appropriate. After reviewing your medical record and screening and assessments performed today your provider may have ordered immunizations, labs, imaging, and/or referrals for you. A list of these orders (if applicable) as well as your Preventive Care list are included within your After Visit Summary for your review. Other Preventive Recommendations:    A preventive eye exam performed by an eye specialist is recommended every 1-2 years to screen for glaucoma; cataracts, macular degeneration, and other eye disorders. A preventive dental visit is recommended every 6 months. Try to get at least 150 minutes of exercise per week or 10,000 steps per day on a pedometer . Order or download the FREE \"Exercise & Physical Activity: Your Everyday Guide\" from [employer]. Call 2-392.227.3740 or search [employer] online. You need 8734-0932 mg of calcium and 8088-9530 IU of vitamin D per day. It is possible to meet your calcium requirement with diet alone, but a vitamin D supplement is usually necessary to meet this goal.  When exposed to the sun, use a sunscreen that protects against both UVA and UVB radiation with an SPF of 30 or greater. Reapply every 2 to 3 hours or after sweating, drying off with a towel, or swimming. Always wear a seat belt when traveling in a car. Always wear a helmet when riding a bicycle or motorcycle.

## 2022-10-26 PROBLEM — I48.0 PAROXYSMAL ATRIAL FIBRILLATION (HCC): Status: ACTIVE | Noted: 2022-10-26

## 2022-10-26 PROBLEM — M17.12 PRIMARY OSTEOARTHRITIS OF LEFT KNEE: Status: ACTIVE | Noted: 2022-10-26

## 2022-10-26 PROBLEM — I50.32 CHRONIC HEART FAILURE WITH PRESERVED EJECTION FRACTION (HCC): Status: ACTIVE | Noted: 2022-10-26

## 2022-11-02 ENCOUNTER — TELEMEDICINE (OUTPATIENT)
Dept: FAMILY MEDICINE CLINIC | Age: 77
End: 2022-11-02
Payer: MEDICARE

## 2022-11-02 DIAGNOSIS — Z00.00 MEDICARE ANNUAL WELLNESS VISIT, SUBSEQUENT: Primary | ICD-10-CM

## 2022-11-02 DIAGNOSIS — G89.29 CHRONIC BILATERAL LOW BACK PAIN WITHOUT SCIATICA: ICD-10-CM

## 2022-11-02 DIAGNOSIS — G89.29 HIP PAIN, CHRONIC, LEFT: ICD-10-CM

## 2022-11-02 DIAGNOSIS — M54.50 CHRONIC BILATERAL LOW BACK PAIN WITHOUT SCIATICA: ICD-10-CM

## 2022-11-02 DIAGNOSIS — M25.552 HIP PAIN, CHRONIC, LEFT: ICD-10-CM

## 2022-11-02 DIAGNOSIS — M17.12 PRIMARY OSTEOARTHRITIS OF LEFT KNEE: ICD-10-CM

## 2022-11-02 PROCEDURE — G0439 PPPS, SUBSEQ VISIT: HCPCS | Performed by: FAMILY MEDICINE

## 2022-11-02 PROCEDURE — 1123F ACP DISCUSS/DSCN MKR DOCD: CPT | Performed by: FAMILY MEDICINE

## 2022-11-02 ASSESSMENT — PATIENT HEALTH QUESTIONNAIRE - PHQ9
SUM OF ALL RESPONSES TO PHQ9 QUESTIONS 1 & 2: 0
SUM OF ALL RESPONSES TO PHQ QUESTIONS 1-9: 0
2. FEELING DOWN, DEPRESSED OR HOPELESS: 0
1. LITTLE INTEREST OR PLEASURE IN DOING THINGS: 0
SUM OF ALL RESPONSES TO PHQ QUESTIONS 1-9: 0

## 2022-11-02 NOTE — PROGRESS NOTES
Medicare Annual Wellness Visit    Gail Kelley is here for Segundo NARANJO    Assessment & Plan   Medicare annual wellness visit, subsequent  -     [facility] Referral to ACP Clinical Specialist  + See below  + Plans to get COVID booster and along with Shingrix; will ask granddaughter in law about need for PCV20 (pharmacist and usually gives him his vaccines, may have already had?))    Primary osteoarthritis of left knee, +recent fall and increased swelling  -     External Referral To Physical Therapy  + Encouraged patient to call ortho's office about fall and swelling--may need earlier appt  + Change PT referral d/t location preference change    Chronic bilateral low back pain without sciatica  -     External Referral To Physical Therapy    Hip pain, chronic, left  -     External Referral To Physical Therapy      Recommendations for Preventive Services Due: see orders and patient instructions/AVS.  Recommended screening schedule for the next 5-10 years is provided to the patient in written form: see Patient Instructions/AVS.     Return for Medicare Annual Wellness Visit in 1 year. And in 4 months for already scheduled regular visit. Subjective     Feel in garage, left knee worsened and some swelling, feels like bone slipping  Had been feeling good after injection  Appt 11/23/22 with Dr. Nickolas Quinteor for scheduled f/u    Asking about different location for PT, son goes to [facility] [place], +convenience      Patient's complete Health Risk Assessment and screening values have been reviewed and are found in Flowsheets. The following problems were reviewed today and where indicated follow up appointments were made and/or referrals ordered.     Positive Risk Factor Screenings with Interventions:    Fall Risk:  Do you feel unsteady or are you worried about falling? : (!) yes  2 or more falls in past year?: (!) yes  Fall with injury in past year?: no   Fall Risk Interventions:    Home safety tips provided  Physical therapy referral ordered for strength and balance training            General Health and ACP:  General  In general, how would you say your health is?: Fair  In the past 7 days, have you experienced any of the following: New or Increased Pain, New or Increased Fatigue, Loneliness, Social Isolation, Stress or Anger?: No  Do you get the social and emotional support that you need?: Yes  Do you have a Living Will?: (!) No    Advance Directives       Power of [facility] Will ACP-Advance Directive ACP-Power of     Not on File Not on File Not on File Not on File          General Health Risk Interventions:  No Living Will: Patient referred to [place] Habits/Nutrition:  Physical Activity: Inactive    Days of Exercise per Week: 0 days    Minutes of Exercise per Session: 0 min     Have you lost any weight without trying in the past 3 months?: (!) Yes     Have you seen the dentist within the past year?: (!) No  Health Habits/Nutrition Interventions:  Dental exam overdue:  patient encouraged to make appointment with his/her dentist  Weight loss related to diuresis    Hearing/Vision:  Do you or your family notice any trouble with your hearing that hasn't been managed with hearing aids?: (!) Yes  Do you have difficulty driving, watching TV, or doing any of your daily activities because of your eyesight?: No  Have you had an eye exam within the past year?: (!) No  No results found.   Hearing/Vision Interventions:  Hearing concerns:  patient declines any further evaluation/treatment for hearing issues, +previous audiology testing and left hearing completely gone, +hearing aid not an option, no concern for right ear  Vision concerns:  Eye appt and cataracts done in the last year, will check on next appt    Safety:  Do you have working smoke detectors?: (!) No  Do you have any tripping hazards - loose or unsecured carpets or rugs?: No  Do you have any tripping hazards - clutter in doorways, halls, or stairs?: (!) Yes  Do you have either shower bars, grab bars, non-slip mats or non-slip surfaces in your shower or bathtub?: (!) No  Do all of your stairways have a railing or banister?: Yes  Do you always fasten your seatbelt when you are in a car?: Yes  Safety Interventions:  Home safety tips provided  Does have smoke detectors and denies any tripping hazards; +non-slip surface in walk-in shower, +needs bar to help get in/out of shower (suggested [employer] if unable to get it installed on own)           Objective      Patient-Reported Vitals  No data recorded         No Known Allergies  Prior to Visit Medications    Medication Sig Taking? Authorizing Provider   gabapentin (NEURONTIN) 100 MG capsule Take 1 capsule by mouth 3 times daily for 180 days. Yes Daphne Landaverde MD   Elastic Bandages & Supports ([address] [facility]) [address] 2 each by Does not apply route daily Dispense one pair of compression stockings 20/30 mmHg  Ankle size 25cm  Calf size 43cm Yes Daphne Landaverde MD   atorvastatin (LIPITOR) 40 MG tablet Take 1 tablet by mouth daily Yes Daphne Landaverde MD   furosemide (LASIX) 40 MG tablet Take 1 tablet by mouth daily Yes WALI Ortega - CNP   metoprolol succinate (TOPROL XL) 25 MG extended release tablet Take 1 tablet by mouth daily Yes WALI Khan   apixaban (ELIQUIS) 5 MG TABS tablet Take 1 tablet by mouth 2 times daily Yes WALI Khan   TUDORZA PRESSAIR 400 MCG/ACT AEPB inhaler Inhale 1 puff into the lungs 2 times daily  Yes Historical Provider, MD   PROAIR  (90 BASE) MCG/ACT inhaler INHALE 2 PUFFS EVERY 4 HOURS AS NEEDED Yes Joseluis Giang MD   aspirin 81 MG tablet Take 81 mg by mouth daily.  Yes Historical Provider, MD Cruz (Including outside providers/suppliers regularly involved in providing care):   Patient Care Team:  Daphne Landaverde MD as PCP - General (Family Medicine)  Daphne Landaverde MD as PCP - REHABILITATION [facility] Provider     Reviewed and updated this visit:  Allergies  Meds           Irisgin Gudino, was evaluated through a synchronous (real-time) audio-video encounter. The patient (or guardian if applicable) is aware that this is a billable service, which includes applicable co-pays. This Virtual Visit was conducted with patient's (and/or legal guardian's) consent. The visit was conducted pursuant to the emergency declaration under the [address] [place] [address] authority and the [employer] Act. Patient identification was verified, and a caregiver was present when appropriate. The patient was located at [place] [address]. Provider was located at [facility] ([address] [place] [address]): [facility]  [address]

## 2022-11-02 NOTE — PATIENT INSTRUCTIONS
Personalized Preventive Plan for Belma Leyden - 11/2/2022  Medicare offers a range of preventive health benefits. Some of the tests and screenings are paid in full while other may be subject to a deductible, co-insurance, and/or copay. Some of these benefits include a comprehensive review of your medical history including lifestyle, illnesses that may run in your family, and various assessments and screenings as appropriate. After reviewing your medical record and screening and assessments performed today your provider may have ordered immunizations, labs, imaging, and/or referrals for you. A list of these orders (if applicable) as well as your Preventive Care list are included within your After Visit Summary for your review. Other Preventive Recommendations:    A preventive eye exam performed by an eye specialist is recommended every 1-2 years to screen for glaucoma; cataracts, macular degeneration, and other eye disorders. A preventive dental visit is recommended every 6 months. Try to get at least 150 minutes of exercise per week or 10,000 steps per day on a pedometer . Order or download the FREE \"Exercise & Physical Activity: Your Everyday Guide\" from [employer]. Call 8-618.696.8248 or search [employer] online. You need 8114-8982 mg of calcium and 7124-3886 IU of vitamin D per day. It is possible to meet your calcium requirement with diet alone, but a vitamin D supplement is usually necessary to meet this goal.  When exposed to the sun, use a sunscreen that protects against both UVA and UVB radiation with an SPF of 30 or greater. Reapply every 2 to 3 hours or after sweating, drying off with a towel, or swimming. Always wear a seat belt when traveling in a car. Always wear a helmet when riding a bicycle or motorcycle.

## 2022-11-02 NOTE — PROGRESS NOTES
Ari Alexander is a 76 y.o. male evaluated via telephone on 11/2/2022 for Medicare AWV  . Documentation:  I communicated with the patient and/or health care decision maker about AWV. Details of this discussion including any medical advice provided: see providers note    Total Time: minutes: 21-30 minutes    Cody Gudino was evaluated through a synchronous (real-time) audio encounter. Patient identification was verified at the start of the visit. He (or guardian if applicable) is aware that this is a billable service, which includes applicable co-pays. This visit was conducted with the patient's (and/or legal guardian's) verbal consent. He has not had a related appointment within my department in the past 7 days or scheduled within the next 24 hours. The patient was located at Home:  [address] [place] [address]. The provider was located at [place] [address]): [facility]  [address]     Note: not billable if this call serves to triage the patient into an appointment for the relevant concern    Glenn Tavera MA

## 2022-11-03 ENCOUNTER — CLINICAL DOCUMENTATION (OUTPATIENT)
Dept: SPIRITUAL SERVICES | Age: 77
End: 2022-11-03

## 2022-11-03 ENCOUNTER — TELEPHONE (OUTPATIENT)
Dept: CARDIOLOGY CLINIC | Age: 77
End: 2022-11-03

## 2022-11-03 ENCOUNTER — OFFICE VISIT (OUTPATIENT)
Dept: CARDIOLOGY CLINIC | Age: 77
End: 2022-11-03
Payer: MEDICARE

## 2022-11-03 VITALS
BODY MASS INDEX: 37.77 KG/M2 | DIASTOLIC BLOOD PRESSURE: 80 MMHG | HEART RATE: 89 BPM | RESPIRATION RATE: 16 BRPM | HEIGHT: 71 IN | WEIGHT: 269.8 LBS | SYSTOLIC BLOOD PRESSURE: 130 MMHG

## 2022-11-03 DIAGNOSIS — I50.32 CHRONIC HEART FAILURE WITH PRESERVED EJECTION FRACTION (HCC): ICD-10-CM

## 2022-11-03 DIAGNOSIS — I48.19 PERSISTENT ATRIAL FIBRILLATION (HCC): Primary | ICD-10-CM

## 2022-11-03 DIAGNOSIS — I25.10 CORONARY ARTERY DISEASE INVOLVING NATIVE CORONARY ARTERY OF NATIVE HEART WITHOUT ANGINA PECTORIS: ICD-10-CM

## 2022-11-03 DIAGNOSIS — G47.33 OSA (OBSTRUCTIVE SLEEP APNEA): ICD-10-CM

## 2022-11-03 PROCEDURE — 99215 OFFICE O/P EST HI 40 MIN: CPT | Performed by: INTERNAL MEDICINE

## 2022-11-03 PROCEDURE — 93000 ELECTROCARDIOGRAM COMPLETE: CPT | Performed by: INTERNAL MEDICINE

## 2022-11-03 PROCEDURE — 3074F SYST BP LT 130 MM HG: CPT | Performed by: INTERNAL MEDICINE

## 2022-11-03 PROCEDURE — 1123F ACP DISCUSS/DSCN MKR DOCD: CPT | Performed by: INTERNAL MEDICINE

## 2022-11-03 PROCEDURE — 3078F DIAST BP <80 MM HG: CPT | Performed by: INTERNAL MEDICINE

## 2022-11-03 NOTE — TELEPHONE ENCOUNTER
Patient having RAN/DCCV on 11/16/2022 at [facility] by Dr. Eugenio Torres. RAN CPT [id number]  DCCV CPT 29560  DX: Atrial fibrillation I48.91    Please obtain authorization and advise.   Thank you!!
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
## 2022-11-03 NOTE — ACP (ADVANCE CARE PLANNING)
Advance Care Planning   Ambulatory ACP Specialist Patient Outreach    Date:  11/3/2022  ACP Specialist:  Tanya Jovel    Outreach call to patient in follow-up to ACP Specialist referral from: Gianna Barroso MD    [x] PCP  [] Provider   [] Ambulatory Care Management [] Other for Reason:    [x] Advance Directive Assistance  [] Code Status Discussion  [] Complete Portable DNR Order  [] Discuss Goals of Care  [] Complete POST/MOST  [] Early ACP Decision-Making  [] Other    Date Referral Received:11/02/2022    Today's Outreach:  [x] First   [] Second  [] Third                               Third outreach made by []  phone  [] email []   [employer]oo     Intervention:  [] Spoke with Patient  [] Left VM requesting return call      Outcome: Spoke with patient's daughter Keisha. Keisha said I should talk to brother Darline French. Called and left VM with Darline French requesting a call back. Mailed documents. Will follow up in two weeks. Next Step:   [] ACP scheduled conversation  [x] Outreach again in two week               [x] Email / Mail ACP Info Sheets  [x] Email / Mail Advance Directive            [] Close Referral. Routing closure to referring provider/staff and to ACP Specialist . [] Closure Letter mailed to Patient with Invitation to Contact ACP Specialist if/when ready.     Thank you for this referral.

## 2022-11-09 ENCOUNTER — OFFICE VISIT (OUTPATIENT)
Dept: ORTHOPEDIC SURGERY | Age: 77
End: 2022-11-09
Payer: MEDICARE

## 2022-11-09 DIAGNOSIS — M17.12 PRIMARY OSTEOARTHRITIS OF LEFT KNEE: Primary | ICD-10-CM

## 2022-11-09 PROCEDURE — 20610 DRAIN/INJ JOINT/BURSA W/O US: CPT | Performed by: ORTHOPAEDIC SURGERY

## 2022-11-09 RX ORDER — TRIAMCINOLONE ACETONIDE 40 MG/ML
40 INJECTION, SUSPENSION INTRA-ARTICULAR; INTRAMUSCULAR ONCE
Status: COMPLETED | OUTPATIENT
Start: 2022-11-09 | End: 2022-11-09

## 2022-11-09 RX ORDER — LIDOCAINE HYDROCHLORIDE 10 MG/ML
4 INJECTION, SOLUTION INFILTRATION; PERINEURAL ONCE
Status: COMPLETED | OUTPATIENT
Start: 2022-11-09 | End: 2022-11-09

## 2022-11-09 RX ADMIN — LIDOCAINE HYDROCHLORIDE 4 ML: 10 INJECTION, SOLUTION INFILTRATION; PERINEURAL at 16:14

## 2022-11-09 RX ADMIN — TRIAMCINOLONE ACETONIDE 40 MG: 40 INJECTION, SUSPENSION INTRA-ARTICULAR; INTRAMUSCULAR at 16:14

## 2022-11-09 NOTE — PROGRESS NOTES
Follow Up Visit     Linwood Smith returns today for follow up visit regarding Left Knee Osteoarthritis. Treatment thus far has included Cortisone injection 8/22/22 with good improvement. He reports symptoms were improved until about 1 week ago when he twisted his knee. He has been ambulating with the assistance of a cane tolerating well. Requesting repeat injection today. REVIEW OF SYSTEMS:     Constitutional:  Negative for weight loss, fevers, chills, fatigue  Cardiovascular: Negative for chest pain, palpitations  Pulmonary: Negative for shortness of breath, labored breathing, cough  GI: negative for abdominal pain, nausea, vomitting   MSK: per HPI  Skin: negative for rash, open wounds    All other systems reviewed and are negative       Physical Exam:     No data recorded    General: Alert and oriented x3, no acute distress  Cardiovascular/pulmonary: No labored breathing, peripheral perfusion intact  Musculoskeletal:    Left knee exam good range of motion 5-120, positive medial sided joint line tenderness with palpation. Valgus varus stress intact. Patella stable tracks midline with crepitus. Controlled Substances Monitoring:      Imaging:  No new imaging was obtained today. Recent imaging obtained showing bone-on-bone degenerative changes to the left knee. Procedure Note: Knee Cortisone injection     The left knee was identified as the injection site. The risk and benefits of a cortisone injection were explained and the patient consented to the injection. Under sterile conditions, the knee was injected with a mixture of 40 mg of Kenalog and 4 mL of 1% Lidocaine without complication. A sterile bandage was applied.     Administrations This Visit       lidocaine 1 % injection 4 mL       Admin Date  11/09/2022 Action  Given Dose  4 mL Route  Intra-artICUlar Administered By  Alfonzo Miller RN              triamcinolone acetonide (KENALOG-40) injection 40 mg       Admin Date  11/09/2022 Action  Given Dose  40 mg Route  Intra-artICUlar Administered By  Lorna Bloch, RN                        Assessment: Left knee bone-on-bone osteoarthritis      Plan: Today we discussed his left knee. Recent cortisone injection providing good relief of symptoms up until roughly 1 week ago when he twisted his knee. Denies fall or further injury he has been ambulating with the assistance of a cane tolerating well. On exam knee is stable with good range of motion. Cortisone injection was repeated for symptom relief today. We will look at having Visco supplement injections approved in the near future. Patient and family were agreeable plan of care today. Follow-up in 3 months. We will notify patient when Visco injections are available. WALI Marsh-CNP  Orthopedic Surgery   11/09/22  4:15 PM    Staff Addendum    I have seen and evaluated the patient and agree with the assessment and plan as documented by Александр Marte CNP. I have performed the key components of the history and physical examination and concur with the findings and plan, and have made changes where appropriate/necessary.           Parveen Mcfarland MD  [address] [place] [address]

## 2022-11-11 ENCOUNTER — TELEPHONE (OUTPATIENT)
Dept: ORTHOPEDIC SURGERY | Age: 77
End: 2022-11-11

## 2022-11-11 NOTE — TELEPHONE ENCOUNTER
Patient was seen in office on 11/9/2022, they would like to proceed with left knee visco supplementation authorization.      Form filled out / process started for left knee Orthovisc authorization

## 2022-11-14 ENCOUNTER — CLINICAL DOCUMENTATION (OUTPATIENT)
Dept: SPIRITUAL SERVICES | Age: 77
End: 2022-11-14

## 2022-11-14 NOTE — ACP (ADVANCE CARE PLANNING)
Advance Care Planning   Ambulatory ACP Specialist Patient Outreach    Date:  11/14/2022  ACP Specialist:  Sailaja Wheeler    Outreach call to patient in follow-up to ACP Specialist referral from: Kary Cerda MD    [x] PCP  [] Provider   [] Ambulatory Care Management [] Other for Reason:    [x] Advance Directive Assistance  [] Code Status Discussion  [] Complete Portable DNR Order  [] Discuss Goals of Care  [] Complete POST/MOST  [] Early ACP Decision-Making  [] Other    Date Referral Received:11/02/2022    Today's Outreach:  [] First   [x] Second  [] Third                               Third outreach made by []  phone  [] email []   [email or web address]     Intervention:  [] Spoke with Patient  [] Left VM requesting return call      Outcome: Spoke with patient's son Taylor Jovel. Taylor Jovel stated that his father has ACP documents completed. He is going to e-mail me them to me. Waiting on e-mail. Will follow up in one week if I do not hear from him first.          Next Step:   [] ACP scheduled conversation  [x] Outreach again in one week               [] Email / Mail [address] [place]  [] Email / Mail Advance Directive            [] Close Referral. Routing closure to referring provider/staff and to ACP Specialist . [] Closure Letter mailed to Patient with Invitation to Contact ACP Specialist if/when ready.     Thank you for this referral.

## 2022-11-15 ENCOUNTER — TELEPHONE (OUTPATIENT)
Dept: NON INVASIVE DIAGNOSTICS | Age: 77
End: 2022-11-15

## 2022-11-15 NOTE — TELEPHONE ENCOUNTER
Reminded patient's daughter, Sheree Reynoso, of scheduled procedure on 11/16. Instructions given and COVID questionnaire completed.

## 2022-11-15 NOTE — TELEPHONE ENCOUNTER
Insurance Authorization- 11/11/2022 - Julia Frye - Approved - Ref # [id number] - 11/11/2022 - 2/11/2023 - Radha Steward

## 2022-11-16 ENCOUNTER — HOSPITAL ENCOUNTER (OUTPATIENT)
Dept: CARDIAC CATH/INVASIVE PROCEDURES | Age: 77
Discharge: HOME | End: 2022-11-16
Payer: MEDICARE

## 2022-11-16 ENCOUNTER — ANESTHESIA (OUTPATIENT)
Dept: CARDIAC CATH/INVASIVE PROCEDURES | Age: 77
End: 2022-11-16

## 2022-11-16 ENCOUNTER — TELEPHONE (OUTPATIENT)
Dept: CARDIOLOGY CLINIC | Age: 77
End: 2022-11-16

## 2022-11-16 ENCOUNTER — ANESTHESIA EVENT (OUTPATIENT)
Dept: CARDIAC CATH/INVASIVE PROCEDURES | Age: 77
End: 2022-11-16

## 2022-11-16 VITALS
BODY MASS INDEX: 36.54 KG/M2 | WEIGHT: 261 LBS | DIASTOLIC BLOOD PRESSURE: 68 MMHG | SYSTOLIC BLOOD PRESSURE: 106 MMHG | TEMPERATURE: 97.9 F | HEIGHT: 71 IN | RESPIRATION RATE: 16 BRPM | OXYGEN SATURATION: 98 % | HEART RATE: 65 BPM

## 2022-11-16 DIAGNOSIS — I48.92 ATRIAL FLUTTER, UNSPECIFIED TYPE (HCC): ICD-10-CM

## 2022-11-16 PROBLEM — I48.19 PERSISTENT ATRIAL FIBRILLATION (HCC): Status: ACTIVE | Noted: 2022-11-16

## 2022-11-16 LAB
EKG ATRIAL RATE: 68 BPM
EKG P AXIS: 47 DEGREES
EKG P-R INTERVAL: 202 MS
EKG Q-T INTERVAL: 436 MS
EKG QRS DURATION: 110 MS
EKG QTC CALCULATION (BAZETT): 463 MS
EKG R AXIS: -28 DEGREES
EKG T AXIS: 16 DEGREES
EKG VENTRICULAR RATE: 68 BPM
LV EF: 55 %
LVEF MODALITY: NORMAL

## 2022-11-16 PROCEDURE — 92960 CARDIOVERSION ELECTRIC EXT: CPT

## 2022-11-16 PROCEDURE — 3700000001 HC ADD 15 MINUTES (ANESTHESIA)

## 2022-11-16 PROCEDURE — 93325 DOPPLER ECHO COLOR FLOW MAPG: CPT | Performed by: INTERNAL MEDICINE

## 2022-11-16 PROCEDURE — 2580000003 HC RX 258

## 2022-11-16 PROCEDURE — 93320 DOPPLER ECHO COMPLETE: CPT | Performed by: INTERNAL MEDICINE

## 2022-11-16 PROCEDURE — 93321 DOPPLER ECHO F-UP/LMTD STD: CPT

## 2022-11-16 PROCEDURE — 2580000003 HC RX 258: Performed by: INTERNAL MEDICINE

## 2022-11-16 PROCEDURE — 93312 ECHO TRANSESOPHAGEAL: CPT | Performed by: INTERNAL MEDICINE

## 2022-11-16 PROCEDURE — 93325 DOPPLER ECHO COLOR FLOW MAPG: CPT

## 2022-11-16 PROCEDURE — 3700000000 HC ANESTHESIA ATTENDED CARE

## 2022-11-16 PROCEDURE — 93312 ECHO TRANSESOPHAGEAL: CPT

## 2022-11-16 PROCEDURE — 2709999900 HC NON-CHARGEABLE SUPPLY

## 2022-11-16 PROCEDURE — 92960 CARDIOVERSION ELECTRIC EXT: CPT | Performed by: INTERNAL MEDICINE

## 2022-11-16 PROCEDURE — 6360000002 HC RX W HCPCS

## 2022-11-16 PROCEDURE — 93005 ELECTROCARDIOGRAM TRACING: CPT | Performed by: INTERNAL MEDICINE

## 2022-11-16 RX ORDER — SODIUM CHLORIDE 9 MG/ML
INJECTION, SOLUTION INTRAVENOUS ONCE
Status: COMPLETED | OUTPATIENT
Start: 2022-11-16 | End: 2022-11-16

## 2022-11-16 RX ORDER — PROPOFOL 10 MG/ML
INJECTION, EMULSION INTRAVENOUS PRN
Status: DISCONTINUED | OUTPATIENT
Start: 2022-11-16 | End: 2022-11-16 | Stop reason: SDUPTHER

## 2022-11-16 RX ORDER — METOPROLOL SUCCINATE 50 MG/1
50 TABLET, EXTENDED RELEASE ORAL DAILY
Qty: 30 TABLET | Refills: 3 | Status: SHIPPED | OUTPATIENT
Start: 2022-11-16

## 2022-11-16 RX ORDER — SODIUM CHLORIDE 9 MG/ML
INJECTION, SOLUTION INTRAVENOUS CONTINUOUS PRN
Status: DISCONTINUED | OUTPATIENT
Start: 2022-11-16 | End: 2022-11-16 | Stop reason: SDUPTHER

## 2022-11-16 RX ADMIN — SODIUM CHLORIDE: 9 INJECTION, SOLUTION INTRAVENOUS at 09:32

## 2022-11-16 RX ADMIN — SODIUM CHLORIDE: 9 INJECTION, SOLUTION INTRAVENOUS at 08:24

## 2022-11-16 RX ADMIN — PROPOFOL 240 MG: 10 INJECTION, EMULSION INTRAVENOUS at 09:35

## 2022-11-16 NOTE — PROCEDURES
PROCEDURE NOTE    Attending Cardiologist: Sonia Perrin MD    Date of Service: 11/16/2022    Procedure: Transesophageal Echocardiogram and Direct Current Cardioversion    Indication: Persistent atrial fibrillation    Anticoagulant: Apixaban    RAN: No left atrial appendage thrombus; moderate AS    Antiarrhythmic drug(s): Metoprolol    Description of procedure:  Patient presented in a fasting and well hydrated state. Informed consent obtained from patient. Risks, benefits and alternatives to RAN and DC cardioversion were explained to the patient in detail, and the patient acknowledged understanding. Cardiac rhythm, arterial oxygen saturation, and blood pressure were continuously monitored. Deep sedation with propofol administered by the Department of Anesthesiology. RAN was performed and there was no evidence of LA or NARESH thrombus. See full RAN report in Epic. After removal of the probe and verification of adequate sedation, direct current cardioversion was performed as described:    Patch placement: Anterior/posterior  Energy: 300 Joules, synchronized  Number of shocks: 1  Outcome: Sinus rhythm  Complications: None    Impression:  Successful RAN-guided DC cardioversion of atrial fibrillation to normal sinus rhythm.     Sonia Perrin MD, [facility] Cardiology

## 2022-11-16 NOTE — H&P
H&P  My office patient here for RAN cardioversion. Office note from 11/3/2022 reviewed. No changes. Impression: Persistent atrial fibrillation  Plan: RAN guided cardioversion    Risks and benefits of transesophageal echocardiogram and cardioversion explained to patient, including but not limited to risk of esophageal perforation, respiratory failure, CVA, failure to restore sinus rhythm, recurrence of atrial or other arrhythmias, skins burns, and rarely death. They voiced understanding and agree to proceed.      Roger Cabral MD
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
## 2022-11-16 NOTE — DISCHARGE INSTRUCTIONS
Electrical Cardioversion: What to Expect at [facility]     Electrical cardioversion is a treatment for an abnormal heartbeat, such as atrial fibrillation, supraventricular tachycardia, or ventricular tachycardia (VT). Your doctor used a brief electrical shock to reset your heart's rhythm. After the procedure, you may have redness, like a sunburn, where the patches were. The medicines you got to make you sleepy may make you feel drowsy for the rest of the day. You may feel soreness or discomfort in your chest wall for a few days. Your doctor may have you take medicines to help the heart beat normally and to prevent blood clots. This care sheet gives you a general idea about how long it will take for you to recover. But each person recovers at a different pace. Follow the steps below to feel better as quickly as possible. How can you care for yourself at home? Medicines    If the doctor gave you a sedative: For 24 hours, don't do anything that requires attention to detail. It takes time for the medicine's effects to completely wear off. For your safety, do not drive or operate any machinery that could be dangerous. Wait until the medicine wears off and you can think clearly and react easily. Be safe with medicines. Take your medicines exactly as prescribed. Call your doctor if you think you are having a problem with your medicine. You may take one or more of the following medicines:  Rate-control medicines to slow the heart rate. Rhythm-control medicines that help the heart keep a normal rhythm. Blood thinners, also called anticoagulants, which help prevent blood clots. You will get more details on the specific medicines your doctor prescribes. Be sure you know how to take your medicines safely.      Do not take any vitamins, over-the-counter medicines, or herbal products without talking to your doctor first.   Exercise    Talk to your doctor about what type and level of exercise are safe for you. When you exercise, watch for signs that your heart is working too hard. You are pushing too hard if you cannot talk while you are exercising. If you become short of breath or dizzy or have chest pain, sit down and rest right away. Check your pulse regularly. Place two fingers on the artery at the palm side of your wrist in line with your thumb. If your heartbeat seems uneven or fast, talk to your doctor. Heart-healthy lifestyle    Do not smoke. If you need help quitting, talk to your doctor about stop-smoking programs and medicines. These can increase your chances of quitting for good. Eat heart-healthy foods. Limit sodium, alcohol, and sugar. Stay at a healthy weight. Lose weight if you need to. Manage other health problems. If you think you may have a problem with alcohol or drug use, talk to your doctor. Follow-up care is a key part of your treatment and safety. Be sure to make and go to all appointments, and call your doctor if you are having problems. It's also a good idea to know your test results and keep a list of the medicines you take. When should you call for help? Call 911 anytime you think you may need emergency care. For example, call if:    You passed out (lost consciousness). You have symptoms of a heart attack. These may include:  Chest pain or pressure, or a strange feeling in the chest.  Sweating. Shortness of breath. Nausea or vomiting. Pain, pressure, or a strange feeling in the back, neck, jaw, or upper belly or in one or both shoulders or arms. Lightheadedness or sudden weakness. A fast or irregular heartbeat. After calling 911, the  may tell you to chew 1 adult-strength or 2 to 4 low-dose aspirin. Wait for an ambulance. Do not try to drive yourself. You have symptoms of a stroke. These may include:  Sudden numbness, tingling, weakness, or loss of movement in your face, arm, or leg, especially on only one side of your body.   Sudden vision changes. Sudden trouble speaking. Sudden confusion or trouble understanding simple statements. Sudden problems with walking or balance. A sudden, severe headache that is different from past headaches. Call your doctor now or seek immediate medical care if:    You feel dizzy or lightheaded, or you feel like you may faint. You have a fast or irregular heartbeat. Watch closely for any changes in your health, and be sure to contact your doctor if you have any problems. Where can you learn more? Go to [email or web address] org and sign in to your [employer] account. Enter A617 in the Filter Squad box to learn more about \"Electrical Cardioversion: What to Expect at Home. \"     If you do not have an account, please click on the \"Sign Up Now\" link. Current as of: January 10, 2022               Content Version: 13.4  © 3906-5830 Healthwise, Incorporated. Care instructions adapted under license by [facility] ([facility]). If you have questions about a medical condition or this instruction, always ask your healthcare professional. [address] any warranty or liability for your use of this information.

## 2022-11-16 NOTE — ANESTHESIA POSTPROCEDURE EVALUATION
Department of Anesthesiology  Postprocedure Note    Patient: Surya Goddard  MRN: [id number]  YOB: 1945  Date of evaluation: 11/16/2022      Procedure Summary     Date: 11/16/22 Room / Location: [facility] CATH LAB; [facility] ECHO    Anesthesia Start: 7309 Anesthesia Stop: 0052    Procedure: SEY RAN CARDIOVERSION W/ ANES Diagnosis: Other persistent atrial fibrillation    Scheduled Providers: Elke Washington MD; WALI Montemayor - CRNA Responsible Provider: Elke Washington MD    Anesthesia Type: MAC ASA Status: 4          Anesthesia Type: No value filed.     Andres Phase I:      Andres Phase II:        Anesthesia Post Evaluation    Patient location during evaluation: PACU  Patient participation: complete - patient participated  Level of consciousness: awake  Pain score: 0  Airway patency: patent  Nausea & Vomiting: no nausea  Complications: no  Cardiovascular status: hemodynamically stable  Respiratory status: acceptable  Hydration status: stable

## 2022-11-21 ENCOUNTER — CLINICAL DOCUMENTATION (OUTPATIENT)
Dept: SPIRITUAL SERVICES | Age: 77
End: 2022-11-21

## 2022-11-21 NOTE — ACP (ADVANCE CARE PLANNING)
Advance Care Planning   Ambulatory ACP Specialist Patient Outreach    Date:  11/21/2022  ACP Specialist:  Bhavesh Goldberg    Outreach call to patient in follow-up to ACP Specialist referral from: Umair Manuel MD    [x] PCP  [] Provider   [] Ambulatory Care Management [] Other for Reason:    [x] Advance Directive Assistance  [] Code Status Discussion  [] Complete Portable DNR Order  [] Discuss Goals of Care  [] Complete POST/MOST  [] Early ACP Decision-Making  [] Other    Date Referral Received:11/21/2022    Today's Outreach:  [] First   [] Second  [x] Third                               Third outreach made by []  phone  [] email []   [id number]     Intervention:  [] Spoke with Patient  [] Left  requesting return call      Outcome: Spoke with patient's son Pravin Ahmadi. Pravin Ahmadi said that they have been really busy and he has not had the time to email me his father's advance directives. I verified that he received the e-mail I sent him and told him I would give him a call in a few weeks. Next Step:   [] ACP scheduled conversation  [x] Outreach again in two week               [] Email / Mail ACP Info Sheets  [] Email / Mail Advance Directive            [] Close Referral. Routing closure to referring provider/staff and to ACP Specialist . [] Closure Letter mailed to Patient with Invitation to Contact ACP Specialist if/when ready.     Thank you for this referral.

## 2022-11-22 ENCOUNTER — OFFICE VISIT (OUTPATIENT)
Dept: ORTHOPEDIC SURGERY | Age: 77
End: 2022-11-22

## 2022-11-22 DIAGNOSIS — M17.12 PRIMARY OSTEOARTHRITIS OF LEFT KNEE: Primary | ICD-10-CM

## 2022-11-22 NOTE — PROGRESS NOTES
Follow Up Visit for Injection    Melonie Carson returns for follow up visit for Orthovisc injection 1. He reports cortisone injection from the weeks ago lasted for about 5 days. He reports symptoms remain unchanged. Physical Exam:   General: Alert and oriented x3, no acute distress  Cardiovascular/pulmonary: No labored breathing, peripheral perfusion intact  Musculoskeletal:    Left knee exam range of motion 5-120, negative joint line tenderness. Valgus varus stress intact. Patella stable tracking midline. No swelling deformity or palpable effusion present. Imaging: Reviewed     Procedure Note: Knee viscosupplementation injection     The left knee was identified as the injection site. The risk and benefits of injection were explained and the patient consented to the injection. Under sterile conditions, the knee was injected with a full dose of Orthovisc. A sterile bandage was applied.     Administrations This Visit       hyaluronan (ORTHOVISC) 30 MG/2ML injection 30 mg       Admin Date  11/22/2022 Action  Given Dose  30 mg Route  Intra-artICUlar Administered By  Derrek Prader, RN                      Assessment/Plan: S/p left knee Orthovisc injection #1 for osteoarthritis  -Continue activity modification/NSAIDS/ICE prn  -f/u next week for Orthovisc injection #2/3      WALI Kwong-CNP  Orthopedic Surgery   11/22/22  3:48 PM

## 2022-11-23 ENCOUNTER — TELEPHONE (OUTPATIENT)
Dept: CARDIOLOGY CLINIC | Age: 77
End: 2022-11-23

## 2022-11-23 ENCOUNTER — NURSE ONLY (OUTPATIENT)
Dept: CARDIOLOGY CLINIC | Age: 77
End: 2022-11-23
Payer: MEDICARE

## 2022-11-23 DIAGNOSIS — I25.10 CORONARY ARTERY DISEASE INVOLVING NATIVE CORONARY ARTERY OF NATIVE HEART WITHOUT ANGINA PECTORIS: Primary | ICD-10-CM

## 2022-11-23 PROCEDURE — 93000 ELECTROCARDIOGRAM COMPLETE: CPT | Performed by: INTERNAL MEDICINE

## 2022-11-23 NOTE — TELEPHONE ENCOUNTER
Contacted patient's daughter, Zia Lundberg, with EKG results. She verbalized understanding.    ----- Message from Ponce Carvalho MD sent at 11/23/2022  8:46 AM EST -----  EKG looks good, remains in sinus rhythm since cardioversion. Continue current medication follow-up as scheduled.

## 2022-11-29 ENCOUNTER — NURSE ONLY (OUTPATIENT)
Dept: ORTHOPEDIC SURGERY | Age: 77
End: 2022-11-29
Payer: MEDICARE

## 2022-11-29 VITALS — WEIGHT: 261 LBS | HEIGHT: 71 IN | BODY MASS INDEX: 36.54 KG/M2

## 2022-11-29 DIAGNOSIS — M17.12 PRIMARY OSTEOARTHRITIS OF LEFT KNEE: Primary | ICD-10-CM

## 2022-11-29 PROCEDURE — 20610 DRAIN/INJ JOINT/BURSA W/O US: CPT | Performed by: NURSE PRACTITIONER

## 2022-11-29 NOTE — PROGRESS NOTES
Follow Up Visit for Injection    Carlie Chaudhry returns for follow up visit for Orthovisc injection 2. He reports improved pain in the knee. Physical Exam:   General: Alert and oriented x3, no acute distress  Cardiovascular/pulmonary: No labored breathing, peripheral perfusion intact  Musculoskeletal:    Left knee exam good range of motion, joint line nontender, no swelling deformity visualized, knee stable on exam.    Imaging: Reviewed     Procedure Note: Knee viscosupplementation injection     The left knee was identified as the injection site. The risk and benefits of injection were explained and the patient consented to the injection. Under sterile conditions, the knee was injected with a full dose of Orthovisc. A sterile bandage was applied.     Administrations This Visit       hyaluronan (ORTHOVISC) 30 MG/2ML injection 30 mg       Admin Date  11/29/2022 Action  Given Dose  30 mg Route  Intra-artICUlar Administered By  Noel Ureña ATC                      Assessment/Plan: S/p left knee Orthovisc injection #2 for osteoarthritis  -Continue activity modification/NSAIDS/ICE prn  -f/u next week for Orthovisc injection #3      WALI Thapa-CNP  Orthopedic Surgery   11/29/22  4:21 PM

## 2022-12-06 ENCOUNTER — NURSE ONLY (OUTPATIENT)
Dept: ORTHOPEDIC SURGERY | Age: 77
End: 2022-12-06

## 2022-12-06 VITALS — BODY MASS INDEX: 36.54 KG/M2 | WEIGHT: 261 LBS | HEIGHT: 71 IN

## 2022-12-06 DIAGNOSIS — M17.12 PRIMARY OSTEOARTHRITIS OF LEFT KNEE: Primary | ICD-10-CM

## 2022-12-06 NOTE — PROGRESS NOTES
Follow Up Visit for Injection    Sergio Daniel returns for follow up visit for Orthovisc injection 3. He reports not changed pain in the knee since last week. Physical Exam:   General: Alert and oriented x3, no acute distress  Cardiovascular/pulmonary: No labored breathing, peripheral perfusion intact  Musculoskeletal:    Left knee exam range of motion 5-1 20.  Joint line nontender. Valgus varus stress intact. Patella tracking midline. Knee stable on exam.    Imaging: Reviewed     Procedure Note: Knee viscosupplementation injection     The left knee was identified as the injection site. The risk and benefits of injection were explained and the patient consented to the injection. Under sterile conditions, the knee was injected with a full dose of Orthovisc. A sterile bandage was applied.     Administrations This Visit       hyaluronan (ORTHOVISC) 30 MG/2ML injection 30 mg       Admin Date  12/06/2022 Action  Given Dose  30 mg Route  Intra-artICUlar Administered By  Ama De Anda ATC                      Assessment/Plan: S/p left knee Orthovisc injection #3 for osteoarthritis  -Continue activity modification/NSAIDS/ICE prn  -f/u PRN or 3 months    Emily Escalante, APRN-CNP  Orthopedic Surgery   12/06/22  4:27 PM

## 2022-12-07 ENCOUNTER — CLINICAL DOCUMENTATION (OUTPATIENT)
Dept: SPIRITUAL SERVICES | Age: 77
End: 2022-12-07

## 2022-12-12 DIAGNOSIS — I50.9 CHRONIC HEART FAILURE, UNSPECIFIED HEART FAILURE TYPE (HCC): ICD-10-CM

## 2022-12-12 RX ORDER — ATORVASTATIN CALCIUM 40 MG/1
40 TABLET, FILM COATED ORAL DAILY
Qty: 90 TABLET | Refills: 1 | Status: SHIPPED | OUTPATIENT
Start: 2022-12-12

## 2022-12-13 RX ORDER — FUROSEMIDE 40 MG/1
40 TABLET ORAL DAILY
Qty: 90 TABLET | Refills: 3 | Status: SHIPPED | OUTPATIENT
Start: 2022-12-13

## 2023-02-27 ENCOUNTER — OFFICE VISIT (OUTPATIENT)
Dept: FAMILY MEDICINE CLINIC | Age: 78
End: 2023-02-27
Payer: MEDICARE

## 2023-02-27 VITALS
SYSTOLIC BLOOD PRESSURE: 139 MMHG | BODY MASS INDEX: 37.24 KG/M2 | HEART RATE: 66 BPM | HEIGHT: 71 IN | RESPIRATION RATE: 18 BRPM | DIASTOLIC BLOOD PRESSURE: 73 MMHG | WEIGHT: 266 LBS | OXYGEN SATURATION: 98 %

## 2023-02-27 DIAGNOSIS — I25.10 CORONARY ARTERY DISEASE INVOLVING NATIVE CORONARY ARTERY OF NATIVE HEART, UNSPECIFIED WHETHER ANGINA PRESENT: ICD-10-CM

## 2023-02-27 DIAGNOSIS — R80.9 TYPE 2 DIABETES MELLITUS WITH MICROALBUMINURIA, WITHOUT LONG-TERM CURRENT USE OF INSULIN (HCC): ICD-10-CM

## 2023-02-27 DIAGNOSIS — M17.12 PRIMARY OSTEOARTHRITIS OF LEFT KNEE: ICD-10-CM

## 2023-02-27 DIAGNOSIS — H61.21 IMPACTED CERUMEN OF RIGHT EAR: ICD-10-CM

## 2023-02-27 DIAGNOSIS — K42.9 UMBILICAL HERNIA WITHOUT OBSTRUCTION AND WITHOUT GANGRENE: ICD-10-CM

## 2023-02-27 DIAGNOSIS — I10 PRIMARY HYPERTENSION: ICD-10-CM

## 2023-02-27 DIAGNOSIS — E11.29 TYPE 2 DIABETES MELLITUS WITH MICROALBUMINURIA, WITHOUT LONG-TERM CURRENT USE OF INSULIN (HCC): ICD-10-CM

## 2023-02-27 DIAGNOSIS — I50.32 CHRONIC DIASTOLIC HEART FAILURE (HCC): ICD-10-CM

## 2023-02-27 DIAGNOSIS — E11.29 TYPE 2 DIABETES MELLITUS WITH MICROALBUMINURIA, WITHOUT LONG-TERM CURRENT USE OF INSULIN (HCC): Primary | ICD-10-CM

## 2023-02-27 DIAGNOSIS — N18.31 STAGE 3A CHRONIC KIDNEY DISEASE (HCC): ICD-10-CM

## 2023-02-27 DIAGNOSIS — G89.29 CHRONIC BILATERAL LOW BACK PAIN WITHOUT SCIATICA: ICD-10-CM

## 2023-02-27 DIAGNOSIS — I48.0 PAROXYSMAL ATRIAL FIBRILLATION (HCC): ICD-10-CM

## 2023-02-27 DIAGNOSIS — M54.50 CHRONIC BILATERAL LOW BACK PAIN WITHOUT SCIATICA: ICD-10-CM

## 2023-02-27 DIAGNOSIS — G47.33 OBSTRUCTIVE SLEEP APNEA SYNDROME: ICD-10-CM

## 2023-02-27 DIAGNOSIS — R80.9 TYPE 2 DIABETES MELLITUS WITH MICROALBUMINURIA, WITHOUT LONG-TERM CURRENT USE OF INSULIN (HCC): Primary | ICD-10-CM

## 2023-02-27 LAB
ALBUMIN SERPL-MCNC: 4.2 G/DL (ref 3.5–5.2)
ALP BLD-CCNC: 59 U/L (ref 40–129)
ALT SERPL-CCNC: 16 U/L (ref 0–40)
ANION GAP SERPL CALCULATED.3IONS-SCNC: 13 MMOL/L (ref 7–16)
AST SERPL-CCNC: 16 U/L (ref 0–39)
BASOPHILS ABSOLUTE: 0.04 E9/L (ref 0–0.2)
BASOPHILS RELATIVE PERCENT: 0.5 % (ref 0–2)
BILIRUB SERPL-MCNC: 0.5 MG/DL (ref 0–1.2)
BUN BLDV-MCNC: 34 MG/DL (ref 6–23)
CALCIUM SERPL-MCNC: 9.9 MG/DL (ref 8.6–10.2)
CHLORIDE BLD-SCNC: 104 MMOL/L (ref 98–107)
CHOLESTEROL, FASTING: 136 MG/DL (ref 0–199)
CO2: 27 MMOL/L (ref 22–29)
CREAT SERPL-MCNC: 1.1 MG/DL (ref 0.7–1.2)
CREATININE URINE: 113 MG/DL (ref 40–278)
EOSINOPHILS ABSOLUTE: 0.19 E9/L (ref 0.05–0.5)
EOSINOPHILS RELATIVE PERCENT: 2.2 % (ref 0–6)
GFR SERPL CREATININE-BSD FRML MDRD: >60 ML/MIN/1.73
GLUCOSE BLD-MCNC: 143 MG/DL (ref 74–99)
HBA1C MFR BLD: 6.9 % (ref 4–5.6)
HCT VFR BLD CALC: 43.2 % (ref 37–54)
HDLC SERPL-MCNC: 50 MG/DL
HEMOGLOBIN: 14.4 G/DL (ref 12.5–16.5)
IMMATURE GRANULOCYTES #: 0.04 E9/L
IMMATURE GRANULOCYTES %: 0.5 % (ref 0–5)
LDL CHOLESTEROL CALCULATED: 61 MG/DL (ref 0–99)
LYMPHOCYTES ABSOLUTE: 1.96 E9/L (ref 1.5–4)
LYMPHOCYTES RELATIVE PERCENT: 22.4 % (ref 20–42)
MCH RBC QN AUTO: 32.4 PG (ref 26–35)
MCHC RBC AUTO-ENTMCNC: 33.3 % (ref 32–34.5)
MCV RBC AUTO: 97.3 FL (ref 80–99.9)
MICROALBUMIN UR-MCNC: 392 MG/L
MICROALBUMIN/CREAT UR-RTO: 346.9 (ref 0–30)
MONOCYTES ABSOLUTE: 0.79 E9/L (ref 0.1–0.95)
MONOCYTES RELATIVE PERCENT: 9 % (ref 2–12)
NEUTROPHILS ABSOLUTE: 5.74 E9/L (ref 1.8–7.3)
NEUTROPHILS RELATIVE PERCENT: 65.4 % (ref 43–80)
PDW BLD-RTO: 12.7 FL (ref 11.5–15)
PLATELET # BLD: 341 E9/L (ref 130–450)
PMV BLD AUTO: 10.5 FL (ref 7–12)
POTASSIUM SERPL-SCNC: 4.5 MMOL/L (ref 3.5–5)
RBC # BLD: 4.44 E12/L (ref 3.8–5.8)
SODIUM BLD-SCNC: 144 MMOL/L (ref 132–146)
TOTAL PROTEIN: 7 G/DL (ref 6.4–8.3)
TRIGLYCERIDE, FASTING: 126 MG/DL (ref 0–149)
VLDLC SERPL CALC-MCNC: 25 MG/DL
WBC # BLD: 8.8 E9/L (ref 4.5–11.5)

## 2023-02-27 PROCEDURE — 1123F ACP DISCUSS/DSCN MKR DOCD: CPT | Performed by: FAMILY MEDICINE

## 2023-02-27 PROCEDURE — G8484 FLU IMMUNIZE NO ADMIN: HCPCS | Performed by: FAMILY MEDICINE

## 2023-02-27 PROCEDURE — 3044F HG A1C LEVEL LT 7.0%: CPT | Performed by: FAMILY MEDICINE

## 2023-02-27 PROCEDURE — G8427 DOCREV CUR MEDS BY ELIG CLIN: HCPCS | Performed by: FAMILY MEDICINE

## 2023-02-27 PROCEDURE — 99214 OFFICE O/P EST MOD 30 MIN: CPT | Performed by: FAMILY MEDICINE

## 2023-02-27 PROCEDURE — G8417 CALC BMI ABV UP PARAM F/U: HCPCS | Performed by: FAMILY MEDICINE

## 2023-02-27 PROCEDURE — 69209 REMOVE IMPACTED EAR WAX UNI: CPT | Performed by: FAMILY MEDICINE

## 2023-02-27 PROCEDURE — 3074F SYST BP LT 130 MM HG: CPT | Performed by: FAMILY MEDICINE

## 2023-02-27 PROCEDURE — 3078F DIAST BP <80 MM HG: CPT | Performed by: FAMILY MEDICINE

## 2023-02-27 PROCEDURE — 1036F TOBACCO NON-USER: CPT | Performed by: FAMILY MEDICINE

## 2023-02-27 RX ORDER — ATORVASTATIN CALCIUM 40 MG/1
40 TABLET, FILM COATED ORAL DAILY
Qty: 90 TABLET | Refills: 3 | Status: SHIPPED | OUTPATIENT
Start: 2023-02-27

## 2023-02-27 RX ORDER — FUROSEMIDE 40 MG/1
40 TABLET ORAL DAILY
Qty: 90 TABLET | Refills: 3 | Status: SHIPPED | OUTPATIENT
Start: 2023-02-27

## 2023-02-27 RX ORDER — METOPROLOL SUCCINATE 50 MG/1
50 TABLET, EXTENDED RELEASE ORAL DAILY
Qty: 90 TABLET | Refills: 3 | Status: SHIPPED | OUTPATIENT
Start: 2023-02-27

## 2023-02-27 RX ORDER — GABAPENTIN 100 MG/1
100 CAPSULE ORAL 3 TIMES DAILY
Qty: 270 CAPSULE | Refills: 3 | Status: SHIPPED
Start: 2023-02-27 | End: 2023-02-27 | Stop reason: ALTCHOICE

## 2023-02-27 ASSESSMENT — PATIENT HEALTH QUESTIONNAIRE - PHQ9
SUM OF ALL RESPONSES TO PHQ9 QUESTIONS 1 & 2: 0
SUM OF ALL RESPONSES TO PHQ QUESTIONS 1-9: 0
SUM OF ALL RESPONSES TO PHQ QUESTIONS 1-9: 0
2. FEELING DOWN, DEPRESSED OR HOPELESS: 0
DEPRESSION UNABLE TO ASSESS: FUNCTIONAL CAPACITY MOTIVATION LIMITS ACCURACY
1. LITTLE INTEREST OR PLEASURE IN DOING THINGS: 0
SUM OF ALL RESPONSES TO PHQ QUESTIONS 1-9: 0
SUM OF ALL RESPONSES TO PHQ QUESTIONS 1-9: 0

## 2023-02-27 ASSESSMENT — ENCOUNTER SYMPTOMS
CONSTIPATION: 0
WHEEZING: 1
COUGH: 1
RHINORRHEA: 1
SINUS PRESSURE: 0
TROUBLE SWALLOWING: 0
VOICE CHANGE: 0
DIARRHEA: 0
VOMITING: 0
NAUSEA: 0
APNEA: 1
SINUS PAIN: 0
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
## 2023-03-01 ASSESSMENT — ENCOUNTER SYMPTOMS
ABDOMINAL PAIN: 1
SHORTNESS OF BREATH: 1

## 2023-03-08 ENCOUNTER — TELEPHONE (OUTPATIENT)
Dept: FAMILY MEDICINE CLINIC | Age: 78
End: 2023-03-08

## 2023-03-08 ENCOUNTER — OFFICE VISIT (OUTPATIENT)
Dept: ORTHOPEDIC SURGERY | Age: 78
End: 2023-03-08
Payer: MEDICARE

## 2023-03-08 VITALS — BODY MASS INDEX: 35.7 KG/M2 | WEIGHT: 255 LBS | HEIGHT: 71 IN

## 2023-03-08 DIAGNOSIS — M17.12 PRIMARY OSTEOARTHRITIS OF LEFT KNEE: Primary | ICD-10-CM

## 2023-03-08 PROCEDURE — 99213 OFFICE O/P EST LOW 20 MIN: CPT | Performed by: ORTHOPAEDIC SURGERY

## 2023-03-08 PROCEDURE — G8427 DOCREV CUR MEDS BY ELIG CLIN: HCPCS | Performed by: ORTHOPAEDIC SURGERY

## 2023-03-08 PROCEDURE — G8484 FLU IMMUNIZE NO ADMIN: HCPCS | Performed by: ORTHOPAEDIC SURGERY

## 2023-03-08 PROCEDURE — G8417 CALC BMI ABV UP PARAM F/U: HCPCS | Performed by: ORTHOPAEDIC SURGERY

## 2023-03-08 PROCEDURE — 1036F TOBACCO NON-USER: CPT | Performed by: ORTHOPAEDIC SURGERY

## 2023-03-08 PROCEDURE — 1123F ACP DISCUSS/DSCN MKR DOCD: CPT | Performed by: ORTHOPAEDIC SURGERY

## 2023-03-08 NOTE — PROGRESS NOTES
Follow Up Visit     Shiraz Kramer returns today for follow up visit regarding left knee orthovisc series. He has had great relief until approximately 1 week ago which began having pain with long distance walking. He is able to range his knee several times and the pain resolved. Overall he is doing well from this injection series. Physical Exam:     Height: 5' 11\" (1.803 m), Weight: 255 lb (115.7 kg)    General: Alert and oriented x3, no acute distress  Cardiovascular/pulmonary: No labored breathing, peripheral perfusion intact  Musculoskeletal:    0 to 100 degrees range of motion of left knee  Skin intact circumferentially  Tender to palpation of the medial joint line  Crepitus with range of motion      Imaging:  X-ray left knee demonstrates significant tricompartmental osteoarthritis with medial joint space collapse. Osteophyte changes with sclerotic dislocations noted. Assessment: Left knee tricompartmental osteoarthritis      Plan:   Continue home exercise program and activity modification as necessary  Follow-up in 3 months for repeat Orthovisc injection series, call ahead for approval  Call the office if symptoms worsen and we can consider corticosteroid injection in the interim    Electronically signed by Raul Wooten DO on 3/8/2023 at 9:10 AM      Staff Addendum    I have seen and evaluated the patient and agree with the assessment and plan as documented by the orthopaedic surgery resident. I have performed the key components of the history and physical examination and concur with the findings and plan, and have made changes where appropriate/necessary.           Brenda Le MD  [address]

## 2023-03-08 NOTE — TELEPHONE ENCOUNTER
Labs actually do look good and stable. His diabetes control is at goal off his medication. He does have stable protein in his urine. Kidney function is good. Cholesterol is at goal. Blood counts look great. Overall, his labs do look good. Thanks.

## 2023-03-08 NOTE — TELEPHONE ENCOUNTER
Caitlyn Hope calling asking about lab results from February. . states she never got a call from anyone about the results and saw results in [place] and was concerned

## 2023-03-20 DIAGNOSIS — I25.10 CORONARY ARTERY DISEASE INVOLVING NATIVE CORONARY ARTERY OF NATIVE HEART, UNSPECIFIED WHETHER ANGINA PRESENT: ICD-10-CM

## 2023-03-20 DIAGNOSIS — I48.0 PAROXYSMAL ATRIAL FIBRILLATION (HCC): ICD-10-CM

## 2023-03-20 DIAGNOSIS — I50.32 CHRONIC DIASTOLIC HEART FAILURE (HCC): ICD-10-CM

## 2023-03-20 RX ORDER — ATORVASTATIN CALCIUM 40 MG/1
40 TABLET, FILM COATED ORAL DAILY
Qty: 90 TABLET | Refills: 3 | Status: SHIPPED | OUTPATIENT
Start: 2023-03-20

## 2023-03-20 RX ORDER — METOPROLOL SUCCINATE 50 MG/1
50 TABLET, EXTENDED RELEASE ORAL DAILY
Qty: 90 TABLET | Refills: 3 | Status: SHIPPED | OUTPATIENT
Start: 2023-03-20

## 2023-03-20 RX ORDER — FUROSEMIDE 40 MG/1
40 TABLET ORAL DAILY
Qty: 30 TABLET | Refills: 0 | Status: SHIPPED | OUTPATIENT
Start: 2023-03-20

## 2023-03-20 NOTE — TELEPHONE ENCOUNTER
All recent medication refills were sent to [facility]. Patient has new insurance now and refills need sent to [facility]. Also, patient is out of his furosemide 40 mg and would like some sent in to [facility] in [address] [facility] please.

## 2023-06-05 ENCOUNTER — TELEPHONE (OUTPATIENT)
Dept: ORTHOPEDIC SURGERY | Age: 78
End: 2023-06-05

## 2023-06-05 NOTE — TELEPHONE ENCOUNTER
Per [facility] portal no auth needed for code . Spoke with daughter to inform her that no Lacy Felice was needed and that due to insurance change her father will receive a different medication.

## 2023-06-20 ENCOUNTER — NURSE ONLY (OUTPATIENT)
Dept: ORTHOPEDIC SURGERY | Age: 78
End: 2023-06-20
Payer: MEDICARE

## 2023-06-20 VITALS — HEIGHT: 71 IN | BODY MASS INDEX: 35.7 KG/M2 | WEIGHT: 255 LBS

## 2023-06-20 DIAGNOSIS — M17.12 PRIMARY OSTEOARTHRITIS OF LEFT KNEE: Primary | ICD-10-CM

## 2023-06-20 PROCEDURE — 20610 DRAIN/INJ JOINT/BURSA W/O US: CPT | Performed by: NURSE PRACTITIONER

## 2023-06-20 NOTE — PROGRESS NOTES
[facility]   ORTHOPAEDIC SURGERY AND SPORTS MEDICINE  DATE OF VISIT: 06/20/23      Follow Up Visit for Injection    CHIEF COMPLAINT:   Chief Complaint   Patient presents with    Follow-up     left knee osteoarthritis    Pain     6 / 10     Injections     Left knee Gelsyn ~ 3 ( 2 / 3 )         Anita Robertson returns for follow up visit for Gelsyn ~ 3 injection 2. He reports symptoms are improved     Physical Exam:   General: Alert and oriented x3, no acute distress  Cardiovascular/pulmonary: No labored breathing, peripheral perfusion intact  Musculoskeletal:    Left knee exam mild swelling and trace effusion present, range of motion is functional, stable ligament testing. Knee is grossly stable on exam.    Imaging: Reviewed     Procedure Note: Knee viscosupplementation injection     The left knee were identified as the injection site. The risk and benefits of injection were explained and the patient consented to the injection. Under sterile conditions, the knee was injected with a full dose of Gelsyn ~ 3. A sterile bandage was applied.     Administrations This Visit       sodium hyaluronate (Viscosup) injection SOSY 16.8 mg       Admin Date  06/20/2023 Action  Given Dose  16.8 mg Route  Intra-artICUlar Administered By  Nasir Armijo ATC                      Assessment/Plan: S/p left knee Gelsyn ~ 3 injection #2 for osteoarthritis  -Continue activity modification/NSAIDS/ICE prn  -f/u next week for Gelsyn ~ 3 injection #3      WALI Jones-CNP  Orthopedic Surgery   06/20/23  4:06 PM

## 2023-06-27 ENCOUNTER — NURSE ONLY (OUTPATIENT)
Dept: ORTHOPEDIC SURGERY | Age: 78
End: 2023-06-27
Payer: MEDICARE

## 2023-06-27 VITALS — HEIGHT: 71 IN | WEIGHT: 255 LBS | BODY MASS INDEX: 35.7 KG/M2

## 2023-06-27 DIAGNOSIS — M17.12 PRIMARY OSTEOARTHRITIS OF LEFT KNEE: Primary | ICD-10-CM

## 2023-06-27 PROCEDURE — 20610 DRAIN/INJ JOINT/BURSA W/O US: CPT | Performed by: NURSE PRACTITIONER

## 2023-08-25 ENCOUNTER — TELEPHONE (OUTPATIENT)
Dept: CARDIOLOGY CLINIC | Age: 78
End: 2023-08-25

## 2023-08-25 ENCOUNTER — NURSE ONLY (OUTPATIENT)
Dept: CARDIOLOGY CLINIC | Age: 78
End: 2023-08-25
Payer: MEDICARE

## 2023-08-25 DIAGNOSIS — I48.0 PAROXYSMAL ATRIAL FIBRILLATION (HCC): Primary | ICD-10-CM

## 2023-08-25 PROCEDURE — 93000 ELECTROCARDIOGRAM COMPLETE: CPT | Performed by: INTERNAL MEDICINE

## 2023-08-25 NOTE — TELEPHONE ENCOUNTER
Patient thinks he is back in atrial fibrillation. He feels palpitations and thumping in his chest.  Patient's son is concerned that he is back in atrial fibrillation. Patient scheduled to come into office for EKG.

## 2023-08-25 NOTE — RESULT ENCOUNTER NOTE
Patient in fact is in atrial fibrillation but his rates are well controlled. Is he having any symptoms, can he tell he is out of rhythm? He is overdue for follow-up with me, was supposed to be seen in February of this year. Please schedule follow-up so we can discuss further management. If he is asymptomatic, leaving him in A-fib and continuing with a rate control strategy would be reasonable. However if he clearly feels better in normal rhythm we may have to be more aggressive about try to get him back into normal rhythm with the use of antiarrhythmic drugs and repeat cardioversion.

## 2023-08-25 NOTE — TELEPHONE ENCOUNTER
Contacted patient's son, Js Butler, with result and recommendations per Dr. Luis Fernando Dunlap. He verbalized understanding. Patient scheduled for office visit with Job Poser on 8/31/23.    ----- Message from Lauren Holland MD sent at 8/25/2023  3:05 PM EDT -----  Patient in fact is in atrial fibrillation but his rates are well controlled. Is he having any symptoms, can he tell he is out of rhythm? He is overdue for follow-up with me, was supposed to be seen in February of this year. Please schedule follow-up so we can discuss further management. If he is asymptomatic, leaving him in A-fib and continuing with a rate control strategy would be reasonable. However if he clearly feels better in normal rhythm we may have to be more aggressive about try to get him back into normal rhythm with the use of antiarrhythmic drugs and repeat cardioversion.

## 2023-08-28 ENCOUNTER — TELEPHONE (OUTPATIENT)
Dept: FAMILY MEDICINE CLINIC | Age: 78
End: 2023-08-28

## 2023-08-28 ENCOUNTER — OFFICE VISIT (OUTPATIENT)
Dept: FAMILY MEDICINE CLINIC | Age: 78
End: 2023-08-28
Payer: MEDICARE

## 2023-08-28 VITALS
HEART RATE: 75 BPM | BODY MASS INDEX: 35 KG/M2 | SYSTOLIC BLOOD PRESSURE: 114 MMHG | RESPIRATION RATE: 18 BRPM | OXYGEN SATURATION: 97 % | HEIGHT: 71 IN | DIASTOLIC BLOOD PRESSURE: 74 MMHG | WEIGHT: 250 LBS | TEMPERATURE: 97.8 F

## 2023-08-28 DIAGNOSIS — G47.33 OBSTRUCTIVE SLEEP APNEA SYNDROME: ICD-10-CM

## 2023-08-28 DIAGNOSIS — I48.0 PAROXYSMAL ATRIAL FIBRILLATION (HCC): ICD-10-CM

## 2023-08-28 DIAGNOSIS — R35.1 NOCTURIA: Primary | ICD-10-CM

## 2023-08-28 DIAGNOSIS — E11.9 TYPE 2 DIABETES MELLITUS WITHOUT COMPLICATION, WITHOUT LONG-TERM CURRENT USE OF INSULIN (HCC): ICD-10-CM

## 2023-08-28 DIAGNOSIS — E55.9 VITAMIN D DEFICIENCY: ICD-10-CM

## 2023-08-28 DIAGNOSIS — I10 PRIMARY HYPERTENSION: ICD-10-CM

## 2023-08-28 DIAGNOSIS — I50.32 CHRONIC DIASTOLIC HEART FAILURE (HCC): ICD-10-CM

## 2023-08-28 DIAGNOSIS — R06.09 DYSPNEA ON EXERTION: ICD-10-CM

## 2023-08-28 DIAGNOSIS — R06.2 WHEEZING: ICD-10-CM

## 2023-08-28 DIAGNOSIS — E78.5 HYPERLIPIDEMIA, UNSPECIFIED HYPERLIPIDEMIA TYPE: ICD-10-CM

## 2023-08-28 DIAGNOSIS — I25.10 CORONARY ARTERY DISEASE INVOLVING NATIVE CORONARY ARTERY OF NATIVE HEART WITHOUT ANGINA PECTORIS: ICD-10-CM

## 2023-08-28 PROBLEM — N28.1 RENAL CYST: Status: ACTIVE | Noted: 2023-08-28

## 2023-08-28 LAB
ALBUMIN SERPL-MCNC: 4.7 G/DL (ref 3.5–5.2)
ALP BLD-CCNC: 63 U/L (ref 40–129)
ALT SERPL-CCNC: 13 U/L (ref 0–40)
ANION GAP SERPL CALCULATED.3IONS-SCNC: 17 MMOL/L (ref 7–16)
AST SERPL-CCNC: 15 U/L (ref 0–39)
BILIRUB SERPL-MCNC: 0.6 MG/DL (ref 0–1.2)
BUN BLDV-MCNC: 24 MG/DL (ref 6–23)
CALCIUM SERPL-MCNC: 9.8 MG/DL (ref 8.6–10.2)
CHLORIDE BLD-SCNC: 99 MMOL/L (ref 98–107)
CO2: 24 MMOL/L (ref 22–29)
CREAT SERPL-MCNC: 1.3 MG/DL (ref 0.7–1.2)
GFR SERPL CREATININE-BSD FRML MDRD: 54 ML/MIN/1.73M2
GLUCOSE BLD-MCNC: 158 MG/DL (ref 74–99)
HBA1C MFR BLD: 7.3 % (ref 4–5.6)
HCT VFR BLD CALC: 46.8 % (ref 37–54)
HEMOGLOBIN: 15.7 G/DL (ref 12.5–16.5)
MCH RBC QN AUTO: 31.6 PG (ref 26–35)
MCHC RBC AUTO-ENTMCNC: 33.5 G/DL (ref 32–34.5)
MCV RBC AUTO: 94.2 FL (ref 80–99.9)
PDW BLD-RTO: 12.5 % (ref 11.5–15)
PLATELET # BLD: 332 K/UL (ref 130–450)
PMV BLD AUTO: 10.7 FL (ref 7–12)
POTASSIUM SERPL-SCNC: 4.4 MMOL/L (ref 3.5–5)
RBC # BLD: 4.97 M/UL (ref 3.8–5.8)
SODIUM BLD-SCNC: 140 MMOL/L (ref 132–146)
T4 FREE: 1.1 NG/DL (ref 0.9–1.7)
TOTAL PROTEIN: 7.8 G/DL (ref 6.4–8.3)
TSH SERPL DL<=0.05 MIU/L-ACNC: 1.51 UIU/ML (ref 0.27–4.2)
VITAMIN D 25-HYDROXY: 38.8 NG/ML (ref 30–100)
WBC # BLD: 8.4 K/UL (ref 4.5–11.5)

## 2023-08-28 PROCEDURE — 3078F DIAST BP <80 MM HG: CPT | Performed by: FAMILY MEDICINE

## 2023-08-28 PROCEDURE — 36415 COLL VENOUS BLD VENIPUNCTURE: CPT | Performed by: FAMILY MEDICINE

## 2023-08-28 PROCEDURE — 99214 OFFICE O/P EST MOD 30 MIN: CPT | Performed by: FAMILY MEDICINE

## 2023-08-28 PROCEDURE — 3074F SYST BP LT 130 MM HG: CPT | Performed by: FAMILY MEDICINE

## 2023-08-28 PROCEDURE — 3051F HG A1C>EQUAL 7.0%<8.0%: CPT | Performed by: FAMILY MEDICINE

## 2023-08-28 PROCEDURE — 1123F ACP DISCUSS/DSCN MKR DOCD: CPT | Performed by: FAMILY MEDICINE

## 2023-08-28 RX ORDER — ALBUTEROL SULFATE 90 UG/1
2 AEROSOL, METERED RESPIRATORY (INHALATION) 4 TIMES DAILY PRN
Qty: 3 EACH | Refills: 1 | Status: SHIPPED
Start: 2023-08-28 | End: 2023-08-29 | Stop reason: SDUPTHER

## 2023-08-28 RX ORDER — FUROSEMIDE 40 MG/1
40 TABLET ORAL DAILY
Qty: 90 TABLET | Refills: 1 | Status: SHIPPED
Start: 2023-08-28 | End: 2023-08-29 | Stop reason: SDUPTHER

## 2023-08-28 RX ORDER — ACLIDINIUM BROMIDE 400 UG/1
1 POWDER, METERED RESPIRATORY (INHALATION)
Qty: 3 EACH | Refills: 1 | Status: SHIPPED
Start: 2023-08-28 | End: 2023-08-29 | Stop reason: SDUPTHER

## 2023-08-28 RX ORDER — BLOOD-GLUCOSE METER
1 KIT MISCELLANEOUS DAILY
Qty: 1 KIT | Refills: 0 | Status: SHIPPED
Start: 2023-08-28 | End: 2023-08-29 | Stop reason: SDUPTHER

## 2023-08-28 RX ORDER — GLUCOSAMINE HCL/CHONDROITIN SU 500-400 MG
CAPSULE ORAL
Qty: 100 STRIP | Refills: 5 | Status: SHIPPED
Start: 2023-08-28 | End: 2023-08-29 | Stop reason: SDUPTHER

## 2023-08-28 RX ORDER — LANCETS 30 GAUGE
1 EACH MISCELLANEOUS DAILY
Qty: 100 EACH | Refills: 5 | Status: SHIPPED
Start: 2023-08-28 | End: 2023-08-28 | Stop reason: SDUPTHER

## 2023-08-28 RX ORDER — LANCETS 30 GAUGE
EACH MISCELLANEOUS
Qty: 100 EACH | Refills: 5 | Status: SHIPPED
Start: 2023-08-28 | End: 2023-08-29 | Stop reason: SDUPTHER

## 2023-08-28 SDOH — ECONOMIC STABILITY: HOUSING INSECURITY
IN THE LAST 12 MONTHS, WAS THERE A TIME WHEN YOU DID NOT HAVE A STEADY PLACE TO SLEEP OR SLEPT IN A SHELTER (INCLUDING NOW)?: NO

## 2023-08-28 SDOH — ECONOMIC STABILITY: FOOD INSECURITY: WITHIN THE PAST 12 MONTHS, THE FOOD YOU BOUGHT JUST DIDN'T LAST AND YOU DIDN'T HAVE MONEY TO GET MORE.: NEVER TRUE

## 2023-08-28 SDOH — ECONOMIC STABILITY: INCOME INSECURITY: HOW HARD IS IT FOR YOU TO PAY FOR THE VERY BASICS LIKE FOOD, HOUSING, MEDICAL CARE, AND HEATING?: NOT VERY HARD

## 2023-08-28 SDOH — ECONOMIC STABILITY: TRANSPORTATION INSECURITY
IN THE PAST 12 MONTHS, HAS LACK OF TRANSPORTATION KEPT YOU FROM MEETINGS, WORK, OR FROM GETTING THINGS NEEDED FOR DAILY LIVING?: NO

## 2023-08-28 SDOH — ECONOMIC STABILITY: FOOD INSECURITY: WITHIN THE PAST 12 MONTHS, YOU WORRIED THAT YOUR FOOD WOULD RUN OUT BEFORE YOU GOT MONEY TO BUY MORE.: NEVER TRUE

## 2023-08-28 NOTE — TELEPHONE ENCOUNTER
Medication issue with scripts for Lancets- note doesn't make sense. Pharmacy needs to know how many lancets is pt using a day to test blood sugar? ??    Please advise asap    [phone] @ [address]

## 2023-08-28 NOTE — PROGRESS NOTES
each 1    TUDORZA PRESSAIR 400 MCG/ACT AEPB inhaler Inhale 1 puff into the lungs in the morning and 1 puff in the evening. 3 each 1    Lancets MISC Check GLC once daily 100 each 5    glucose monitoring kit 1 kit by Does not apply route daily 1 kit 0    blood glucose monitor strips Test daily times a day & as needed for symptoms of irregular blood glucose. Dispense sufficient amount for indicated testing frequency plus additional to accommodate PRN testing needs. 100 strip 5     No current facility-administered medications for this visit.         Tulio Shaver MD

## 2023-08-29 DIAGNOSIS — E11.9 TYPE 2 DIABETES MELLITUS WITHOUT COMPLICATION, WITHOUT LONG-TERM CURRENT USE OF INSULIN (HCC): ICD-10-CM

## 2023-08-29 DIAGNOSIS — I50.32 CHRONIC DIASTOLIC HEART FAILURE (HCC): ICD-10-CM

## 2023-08-29 DIAGNOSIS — R06.2 WHEEZING: ICD-10-CM

## 2023-08-29 RX ORDER — ALBUTEROL SULFATE 90 UG/1
2 AEROSOL, METERED RESPIRATORY (INHALATION) 4 TIMES DAILY PRN
Qty: 3 EACH | Refills: 1 | Status: SHIPPED | OUTPATIENT
Start: 2023-08-29

## 2023-08-29 RX ORDER — BLOOD-GLUCOSE METER
1 KIT MISCELLANEOUS DAILY
Qty: 1 KIT | Refills: 0 | Status: SHIPPED | OUTPATIENT
Start: 2023-08-29

## 2023-08-29 RX ORDER — ACLIDINIUM BROMIDE 400 UG/1
1 POWDER, METERED RESPIRATORY (INHALATION)
Qty: 3 EACH | Refills: 1 | Status: SHIPPED | OUTPATIENT
Start: 2023-08-29

## 2023-08-29 RX ORDER — FUROSEMIDE 40 MG/1
40 TABLET ORAL DAILY
Qty: 90 TABLET | Refills: 1 | Status: SHIPPED | OUTPATIENT
Start: 2023-08-29

## 2023-08-29 RX ORDER — LANCETS 30 GAUGE
EACH MISCELLANEOUS
Qty: 100 EACH | Refills: 5 | Status: SHIPPED | OUTPATIENT
Start: 2023-08-29

## 2023-08-29 RX ORDER — GLUCOSAMINE HCL/CHONDROITIN SU 500-400 MG
CAPSULE ORAL
Qty: 100 STRIP | Refills: 5 | Status: SHIPPED | OUTPATIENT
Start: 2023-08-29

## 2023-08-29 NOTE — TELEPHONE ENCOUNTER
Scripts were sent to [facility], needs to be sent to [facility]

## 2023-08-31 ENCOUNTER — OFFICE VISIT (OUTPATIENT)
Dept: CARDIOLOGY CLINIC | Age: 78
End: 2023-08-31
Payer: MEDICARE

## 2023-08-31 VITALS
RESPIRATION RATE: 18 BRPM | HEIGHT: 71 IN | WEIGHT: 252.2 LBS | BODY MASS INDEX: 35.31 KG/M2 | HEART RATE: 74 BPM | SYSTOLIC BLOOD PRESSURE: 128 MMHG | DIASTOLIC BLOOD PRESSURE: 78 MMHG

## 2023-08-31 DIAGNOSIS — I48.0 PAROXYSMAL ATRIAL FIBRILLATION (HCC): ICD-10-CM

## 2023-08-31 DIAGNOSIS — I25.10 CORONARY ARTERY DISEASE INVOLVING NATIVE CORONARY ARTERY OF NATIVE HEART WITHOUT ANGINA PECTORIS: Primary | ICD-10-CM

## 2023-08-31 PROCEDURE — 99214 OFFICE O/P EST MOD 30 MIN: CPT | Performed by: CLINICAL NURSE SPECIALIST

## 2023-08-31 PROCEDURE — 3078F DIAST BP <80 MM HG: CPT | Performed by: CLINICAL NURSE SPECIALIST

## 2023-08-31 PROCEDURE — 93000 ELECTROCARDIOGRAM COMPLETE: CPT | Performed by: CLINICAL NURSE SPECIALIST

## 2023-08-31 PROCEDURE — 1123F ACP DISCUSS/DSCN MKR DOCD: CPT | Performed by: CLINICAL NURSE SPECIALIST

## 2023-08-31 PROCEDURE — 3074F SYST BP LT 130 MM HG: CPT | Performed by: CLINICAL NURSE SPECIALIST

## 2023-08-31 ASSESSMENT — ENCOUNTER SYMPTOMS
SHORTNESS OF BREATH: 0
CHEST TIGHTNESS: 0
ABDOMINAL PAIN: 0

## 2023-08-31 NOTE — PROGRESS NOTES
motor deficits apparent, normal mood and affect, alert and oriented x 3  Peripheral Pulses: Intact posterior tibial pulses bilaterally    Laboratory Tests:  Lab Results   Component Value Date    CREATININE 1.3 (H) 08/28/2023    BUN 24 (H) 08/28/2023     08/28/2023    K 4.4 08/28/2023    CL 99 08/28/2023    CO2 24 08/28/2023     Lab Results   Component Value Date/Time    MG 2.0 08/02/2022 12:01 PM     Lab Results   Component Value Date    WBC 8.4 08/28/2023    HGB 15.7 08/28/2023    HCT 46.8 08/28/2023    MCV 94.2 08/28/2023     08/28/2023     Lab Results   Component Value Date    ALT 13 08/28/2023    AST 15 08/28/2023    ALKPHOS 63 08/28/2023    BILITOT 0.6 08/28/2023     Lab Results   Component Value Date    CKTOTAL 79 10/04/2017    CKMB 1.5 10/04/2017    TROPONINI <0.01 10/04/2017     Lab Results   Component Value Date    INR 1.0 10/04/2017    PROTIME 11.4 10/04/2017     Lab Results   Component Value Date    TSH 1.51 08/28/2023     Lab Results   Component Value Date    LABA1C 7.3 (H) 08/28/2023     No results found for: EAG  Lab Results   Component Value Date    CHOL 122 08/02/2022    CHOL 176 05/09/2019    CHOL 163 06/13/2018     Lab Results   Component Value Date    TRIG 135 08/02/2022    TRIG 212 (H) 05/09/2019    TRIG 182 (H) 06/13/2018     Lab Results   Component Value Date    HDL 50 02/27/2023    HDL 44 08/02/2022    HDL 51 05/09/2019     Lab Results   Component Value Date    LDLCALC 61 02/27/2023    LDLCALC 51 08/02/2022    LDLCALC 83 05/09/2019     Lab Results   Component Value Date    LABVLDL 25 02/27/2023    LABVLDL 27 08/02/2022    LABVLDL 42 05/09/2019         Cardiac Tests:    ECG today showed atrial fibrillation with low voltage in precordial leads. Poor R-wave progression -may be secondary to pulmonary disease      Echocardiogram:   TTE 9/2/22   Summary   Technically difficult study - limited visualization. Atrial fibrillation noted.    Micro-bubble contrast injected to enhance left

## 2023-09-07 NOTE — PROGRESS NOTES
[address] [facility] PRE-ADMISSION TESTING INSTRUCTIONS    The Preadmission Testing patient is instructed accordingly using the following criteria (check applicable):    ARRIVAL INSTRUCTIONS:  [x] Parking the day of surgery is located in the Main Entrance lot. Upon entering the door make an immediate right to the surgery reception desk. [x] Bring photo ID and insurance card. [] Bring in a copy of Living will or Durable Power of  papers. [x] Please be sure to arrange for responsible adult to provide transportation to and from the hospital.    [x] Please arrange for responsible adult to be with you for the 24 hour period post procedure due to having anesthesia. [x] If you awake am of surgery not feeling well or have temperature >100 please call [phone]. GENERAL INSTRUCTIONS:    [x] Nothing by mouth after midnight, including gum, candy, mints or water. [x] You may brush your teeth, but do not swallow any water. [x] Take medications as instructed with 1-2 oz of water.    [] Stop herbal supplements and vitamins 5 days prior to procedure. [x] Follow preop dosing of blood thinners per physician instructions. [] Take 1/2 dose of evening insulin, but no insulin after midnight.    [] No oral diabetic medications after midnight.    [] If diabetic and have low blood sugar or feel symptomatic, take 1-2oz apple juice only. [] Bring inhalers day of surgery. [] Bring urine specimen day of surgery. [x] Shower or bath with soap, lather and rinse well AM of surgery. No lotion, powders or creams on your body. [] Follow bowel prep as instructed per surgeon. [x] No tobacco products within 24 hours of surgery . [x] No alcohol or illegal drug use within 24 hours of surgery. [x] Jewelry, body piercings , eyeglasses, contact lenses and dentures are not permitted into surgery (bring cases).       [x] Please do not wear any nail polish, make up or hair products on

## 2023-09-11 ENCOUNTER — ANESTHESIA EVENT (OUTPATIENT)
Dept: INPATIENT UNIT | Age: 78
End: 2023-09-11
Payer: MEDICARE

## 2023-09-12 ENCOUNTER — ANESTHESIA (OUTPATIENT)
Dept: INPATIENT UNIT | Age: 78
End: 2023-09-12
Payer: MEDICARE

## 2023-09-12 ENCOUNTER — HOSPITAL ENCOUNTER (OUTPATIENT)
Dept: INPATIENT UNIT | Age: 78
Setting detail: OUTPATIENT SURGERY
Discharge: HOME | End: 2023-09-12
Attending: INTERNAL MEDICINE | Admitting: INTERNAL MEDICINE
Payer: MEDICARE

## 2023-09-12 VITALS
TEMPERATURE: 97.5 F | OXYGEN SATURATION: 98 % | WEIGHT: 250 LBS | BODY MASS INDEX: 35 KG/M2 | RESPIRATION RATE: 18 BRPM | HEART RATE: 60 BPM | DIASTOLIC BLOOD PRESSURE: 75 MMHG | HEIGHT: 71 IN | SYSTOLIC BLOOD PRESSURE: 118 MMHG

## 2023-09-12 PROCEDURE — 7100000010 HC PHASE II RECOVERY - FIRST 15 MIN: Performed by: ANESTHESIOLOGY

## 2023-09-12 PROCEDURE — 3700000000 HC ANESTHESIA ATTENDED CARE: Performed by: ANESTHESIOLOGY

## 2023-09-12 PROCEDURE — 7100000011 HC PHASE II RECOVERY - ADDTL 15 MIN: Performed by: ANESTHESIOLOGY

## 2023-09-12 PROCEDURE — 92960 CARDIOVERSION ELECTRIC EXT: CPT | Performed by: INTERNAL MEDICINE

## 2023-09-12 PROCEDURE — 2580000003 HC RX 258

## 2023-09-12 PROCEDURE — 92960 CARDIOVERSION ELECTRIC EXT: CPT | Performed by: ANESTHESIOLOGY

## 2023-09-12 PROCEDURE — 3700000001 HC ADD 15 MINUTES (ANESTHESIA): Performed by: ANESTHESIOLOGY

## 2023-09-12 PROCEDURE — 6360000002 HC RX W HCPCS

## 2023-09-12 RX ORDER — SODIUM CHLORIDE 0.9 % (FLUSH) 0.9 %
5-40 SYRINGE (ML) INJECTION PRN
Status: DISCONTINUED | OUTPATIENT
Start: 2023-09-12 | End: 2023-09-13 | Stop reason: HOSPADM

## 2023-09-12 RX ORDER — AMIODARONE HYDROCHLORIDE 200 MG/1
200 TABLET ORAL DAILY
Qty: 30 TABLET | Refills: 5 | Status: SHIPPED | OUTPATIENT
Start: 2023-10-08

## 2023-09-12 RX ORDER — SODIUM CHLORIDE 9 MG/ML
INJECTION, SOLUTION INTRAVENOUS PRN
Status: DISCONTINUED | OUTPATIENT
Start: 2023-09-12 | End: 2023-09-13 | Stop reason: HOSPADM

## 2023-09-12 RX ORDER — AMIODARONE HYDROCHLORIDE 200 MG/1
200 TABLET ORAL 3 TIMES DAILY
Qty: 75 TABLET | Refills: 0 | Status: SHIPPED | OUTPATIENT
Start: 2023-09-12 | End: 2023-10-07

## 2023-09-12 RX ORDER — SODIUM CHLORIDE 9 MG/ML
INJECTION, SOLUTION INTRAVENOUS CONTINUOUS PRN
Status: DISCONTINUED | OUTPATIENT
Start: 2023-09-12 | End: 2023-09-12 | Stop reason: SDUPTHER

## 2023-09-12 RX ORDER — PROPOFOL 10 MG/ML
INJECTION, EMULSION INTRAVENOUS PRN
Status: DISCONTINUED | OUTPATIENT
Start: 2023-09-12 | End: 2023-09-12 | Stop reason: SDUPTHER

## 2023-09-12 RX ORDER — SODIUM CHLORIDE 0.9 % (FLUSH) 0.9 %
5-40 SYRINGE (ML) INJECTION EVERY 12 HOURS SCHEDULED
Status: DISCONTINUED | OUTPATIENT
Start: 2023-09-12 | End: 2023-09-13 | Stop reason: HOSPADM

## 2023-09-12 RX ADMIN — PROPOFOL 100 MG: 10 INJECTION, EMULSION INTRAVENOUS at 07:40

## 2023-09-12 RX ADMIN — SODIUM CHLORIDE: 9 INJECTION, SOLUTION INTRAVENOUS at 07:17

## 2023-09-12 ASSESSMENT — PAIN - FUNCTIONAL ASSESSMENT: PAIN_FUNCTIONAL_ASSESSMENT: 0-10

## 2023-09-12 ASSESSMENT — PAIN SCALES - GENERAL: PAINLEVEL_OUTOF10: 0

## 2023-09-12 NOTE — H&P
\"CHOLHDLRATIO\"  No results for input(s): \"PROBNP\" in the last 72 hours. Cardiac Tests:  EC/3/2022: Atrial fibrillation 89 beats minute. Normal axis and intervals. Low voltage. Echocardiogram:   TTE 22   Summary   Technically difficult study - limited visualization. Atrial fibrillation noted. Micro-bubble contrast injected to enhance left ventricular visualization. Normal left ventricular size. LV systolic function appears to be low normal/mildly reduced with   eyld-vs-pcri variability due to AF. Ejection fraction is visually estimated at 50-55%. Indeterminate diastolic function. No regional wall motion abnormalities seen. Normal left ventricular wall thickness. Normal right ventricular size and function. Biatrial dilation. Mild mitral regurgitation. Aortic regurgitation, not well seen. Mild-moderate aortic stenosis is present. Stress test:    Pharmacologic stress 10/5/2022  Gated SPECT left ventricular ejection fraction was calculated to be 54%, with normal myocardial thickening and wall motion. Impression:    ECG during the infusion did not change. The myocardial perfusion imaging was normal with attenuation artifact. Overall left ventricular systolic function was normal without regional wall motion abnormalities. Low risk general pharmacologic stress test.    Cardiac catheterization:     No orders of the defined types were placed in this encounter. Requested Prescriptions      No prescriptions requested or ordered in this encounter        ASSESSMENT / PLAN:  Recurrent persistent atrial fibrillation, diagnosed 2022. Rate controlled, apixaban. RAN/DCC 2022 now with recurrence  Chronic HFpEF. Coronary artery disease, remote angioplasty .   Nonischemic stress recently  Mild to moderate aortic stenosis, aortic regurgitation, mild MR  Hypertension, well controlled  CKD creatinine 1.3  Hyperlipidemia  Type 2 diabetes  FLORIAN on CPAP  Pulmonary

## 2023-09-12 NOTE — PROCEDURES
PROCEDURE NOTE    Attending Cardiologist:  Vivian Stark MD    Date of Service: 9/12/2023    Procedure: Direct Current Cardioversion    Indication: Persistent atrial fibrillation    Anticoagulant: Apixaban    Antiarrhythmic drug(s): Metoprolol    Description of procedure:  Patient presented in a fasting and well hydrated state. Informed consent obtained from patient. Risks, benefits and alternatives to DC cardioversion were explained to the patient in detail, and the patient acknowledged understanding. Cardiac rhythm, arterial oxygen saturation, and blood pressure were continuously monitored. Deep sedation with propofol administered by the Department of Anesthesiology. Patch placement: Anterior/posterior  Energy: 300 Joules, synchronized  Number of shocks: 1  Outcome: Sinus rhythm  Complications: None    Impression:  Successful direct current cardioversion of atrial fibrillation to normal sinus rhythm. Plan:  Start oral amiodarone loading.     Vivian Stark MD, [facility] Cardiology

## 2023-09-12 NOTE — ANESTHESIA PRE PROCEDURE
Department of Anesthesiology  Preprocedure Note       Name:  Aung Estrella   Age:  78 y.o.  :  1945                                          MRN:  [id number]         Date:  2023      Surgeon: * No surgeons listed *    Procedure: * No procedures listed *    Medications prior to admission:   Prior to Admission medications    Medication Sig Start Date End Date Taking? Authorizing Provider   furosemide (LASIX) 40 MG tablet Take 1 tablet by mouth daily 23   Jayden Castro MD   albuterol sulfate HFA (VENTOLIN HFA) 108 (90 Base) MCG/ACT inhaler Inhale 2 puffs into the lungs 4 times daily as needed for Wheezing Substitute as needed. 23   Jayden Castro MD   TUDORZA PRESSAIR 400 MCG/ACT AEPB inhaler Inhale 1 puff into the lungs in the morning and 1 puff in the evening. 23   Jayden Castro MD   Lancets MISC Check GLC once daily 23   Jayden Castro MD   glucose monitoring kit 1 kit by Does not apply route daily 23   Jayden Castro MD   blood glucose monitor strips Test daily times a day & as needed for symptoms of irregular blood glucose. Dispense sufficient amount for indicated testing frequency plus additional to accommodate PRN testing needs. 23   Jayden Castro MD   atorvastatin (LIPITOR) 40 MG tablet Take 1 tablet by mouth daily 3/20/23   Kailey Bustamante MD   metoprolol succinate (TOPROL XL) 50 MG extended release tablet Take 1 tablet by mouth daily 3/20/23   Kailey Bustamante MD   apixaban Marvia Lilibeth) 5 MG TABS tablet Take 1 tablet by mouth 2 times daily 3/20/23   Kailey Bustamante MD   Elastic Bandages & Supports ([address]) Benny Moran 2 each by Does not apply route daily Dispense one pair of compression stockings 20/30 mmHg  Ankle size 25cm  Calf size 43cm 10/4/22   Kailey Bustamante MD       Current medications:    No current facility-administered medications for this encounter.        Allergies:  No Known Allergies    Problem List:

## 2023-09-12 NOTE — ANESTHESIA POSTPROCEDURE EVALUATION
Department of Anesthesiology  Postprocedure Note    Patient: Alejandra Michele  MRN: [id number]  YOB: 1945  Date of evaluation: 9/12/2023      Procedure Summary     Date: 09/12/23 Room / Location: [facility] Stage I    Anesthesia Start: 0715 Anesthesia Stop: 4677    Procedure: CARDIOVERSION Diagnosis:     Scheduled Providers:  Responsible Provider: Peter Thorpe DO    Anesthesia Type: MAC ASA Status: 3          Anesthesia Type: MAC    Andres Phase I: Andres Score: 10    Andres Phase II: Andres Score: 10      Anesthesia Post Evaluation    Patient location during evaluation: bedside  Level of consciousness: awake  Pain score: 0  Nausea & Vomiting: no nausea  Complications: no  Cardiovascular status: blood pressure returned to baseline  Pain management: satisfactory to patient

## 2023-09-13 LAB
EKG ATRIAL RATE: 64 BPM
EKG P AXIS: 81 DEGREES
EKG P-R INTERVAL: 200 MS
EKG Q-T INTERVAL: 440 MS
EKG QRS DURATION: 112 MS
EKG QTC CALCULATION (BAZETT): 453 MS
EKG R AXIS: -25 DEGREES
EKG T AXIS: -24 DEGREES
EKG VENTRICULAR RATE: 64 BPM

## 2023-09-20 ENCOUNTER — TELEPHONE (OUTPATIENT)
Dept: CARDIOLOGY CLINIC | Age: 78
End: 2023-09-20

## 2023-09-20 ENCOUNTER — NURSE ONLY (OUTPATIENT)
Dept: CARDIOLOGY CLINIC | Age: 78
End: 2023-09-20
Payer: MEDICARE

## 2023-09-20 DIAGNOSIS — I25.10 CORONARY ARTERY DISEASE INVOLVING NATIVE CORONARY ARTERY OF NATIVE HEART WITHOUT ANGINA PECTORIS: Primary | ICD-10-CM

## 2023-09-20 PROCEDURE — 93000 ELECTROCARDIOGRAM COMPLETE: CPT | Performed by: INTERNAL MEDICINE

## 2023-09-20 NOTE — TELEPHONE ENCOUNTER
EKG shows he is in normal rhythm. Heart rate is in the high 50s. If feeling well, continue current amiodarone loading, is supposed to be 200 mg 3 times a day for 25 days (starting day of the cardioversion) then 200 mg once daily. Follow-up as scheduled.

## 2023-09-27 ENCOUNTER — HOSPITAL ENCOUNTER (OUTPATIENT)
Dept: PULMONOLOGY | Age: 78
Discharge: HOME | End: 2023-09-27
Attending: FAMILY MEDICINE
Payer: MEDICARE

## 2023-09-27 ENCOUNTER — HOSPITAL ENCOUNTER (OUTPATIENT)
Dept: ULTRASOUND IMAGING | Age: 78
Discharge: HOME | End: 2023-09-29
Attending: FAMILY MEDICINE
Payer: MEDICARE

## 2023-09-27 DIAGNOSIS — R06.2 WHEEZING: ICD-10-CM

## 2023-09-27 DIAGNOSIS — R35.1 NOCTURIA: ICD-10-CM

## 2023-09-27 DIAGNOSIS — R06.09 DYSPNEA ON EXERTION: ICD-10-CM

## 2023-09-27 PROCEDURE — 94729 DIFFUSING CAPACITY: CPT

## 2023-09-27 PROCEDURE — 94726 PLETHYSMOGRAPHY LUNG VOLUMES: CPT

## 2023-09-27 PROCEDURE — 94060 EVALUATION OF WHEEZING: CPT

## 2023-09-27 PROCEDURE — 76775 US EXAM ABDO BACK WALL LIM: CPT | Performed by: FAMILY MEDICINE

## 2023-09-28 RX ORDER — TAMSULOSIN HYDROCHLORIDE 0.4 MG/1
0.4 CAPSULE ORAL DAILY
Qty: 30 CAPSULE | Refills: 1 | Status: SHIPPED
Start: 2023-09-28 | End: 2023-11-13

## 2023-10-04 DIAGNOSIS — E11.9 TYPE 2 DIABETES MELLITUS WITHOUT COMPLICATION, WITHOUT LONG-TERM CURRENT USE OF INSULIN (HCC): ICD-10-CM

## 2023-10-04 DIAGNOSIS — I48.0 PAROXYSMAL ATRIAL FIBRILLATION (HCC): ICD-10-CM

## 2023-10-04 RX ORDER — TIOTROPIUM BROMIDE 18 UG/1
18 CAPSULE ORAL; RESPIRATORY (INHALATION) DAILY
Qty: 90 CAPSULE | Refills: 0 | Status: SHIPPED | OUTPATIENT
Start: 2023-10-04

## 2023-10-04 NOTE — TELEPHONE ENCOUNTER
Last Appointment   8/28/2023  Next Appointment  11/2/2023  Patients daughter called in regarding the script for Jaden Childers, we were supposed to check to see if we could get this covered or if there was anything else that could be sent in for him since 8-29-23, calling in for an update

## 2023-10-05 RX ORDER — GLUCOSAMINE HCL/CHONDROITIN SU 500-400 MG
CAPSULE ORAL
Qty: 100 STRIP | Refills: 5 | Status: SHIPPED | OUTPATIENT
Start: 2023-10-05

## 2023-10-05 RX ORDER — BLOOD-GLUCOSE METER
1 KIT MISCELLANEOUS DAILY
Qty: 1 KIT | Refills: 0 | Status: SHIPPED | OUTPATIENT
Start: 2023-10-05

## 2023-10-05 RX ORDER — LANCETS 30 GAUGE
EACH MISCELLANEOUS
Qty: 100 EACH | Refills: 5 | Status: SHIPPED | OUTPATIENT
Start: 2023-10-05

## 2023-10-05 NOTE — TELEPHONE ENCOUNTER
Glucometer written on 8/29/23  Please print or pend to pharmacy they prefer. Please also include lancets and test strips because this request will be needed next.

## 2023-10-05 NOTE — TELEPHONE ENCOUNTER
The pulmonary function test has been completed and awaiting interpretation. My staff is going to reach out to the pulmonology lab and find out when this interpretation will be available. This is important to establish a diagnosis which will help to get medications covered. Regarding your insurance is lack of coverage for Naseem Banda, I will try to write a prescription for Spiriva and we will find out if the pharmacy is able to get this covered. The Spiriva takes the place of the Tudorza inhaler. You have seen pulmonology for obstructive sleep apnea it is also reasonable to pull them into the discussion regarding wheezing and what inhaler is best to use for this.

## 2023-10-10 DIAGNOSIS — E11.9 TYPE 2 DIABETES MELLITUS WITHOUT COMPLICATION, WITHOUT LONG-TERM CURRENT USE OF INSULIN (HCC): ICD-10-CM

## 2023-10-10 RX ORDER — GLUCOSAMINE HCL/CHONDROITIN SU 500-400 MG
CAPSULE ORAL
Qty: 100 STRIP | Refills: 5 | OUTPATIENT
Start: 2023-10-10

## 2023-10-23 ENCOUNTER — OFFICE VISIT (OUTPATIENT)
Dept: ORTHOPEDIC SURGERY | Age: 78
End: 2023-10-23
Payer: MEDICARE

## 2023-10-23 VITALS — BODY MASS INDEX: 35 KG/M2 | WEIGHT: 250 LBS | HEIGHT: 71 IN

## 2023-10-23 DIAGNOSIS — M17.12 PRIMARY OSTEOARTHRITIS OF LEFT KNEE: Primary | ICD-10-CM

## 2023-10-23 PROCEDURE — 1123F ACP DISCUSS/DSCN MKR DOCD: CPT | Performed by: NURSE PRACTITIONER

## 2023-10-23 PROCEDURE — 20610 DRAIN/INJ JOINT/BURSA W/O US: CPT | Performed by: NURSE PRACTITIONER

## 2023-10-23 PROCEDURE — 99213 OFFICE O/P EST LOW 20 MIN: CPT | Performed by: NURSE PRACTITIONER

## 2023-10-23 RX ORDER — TRIAMCINOLONE ACETONIDE 40 MG/ML
40 INJECTION, SUSPENSION INTRA-ARTICULAR; INTRAMUSCULAR ONCE
Status: COMPLETED | OUTPATIENT
Start: 2023-10-23 | End: 2023-10-23

## 2023-10-23 RX ORDER — BUPIVACAINE HYDROCHLORIDE 2.5 MG/ML
2 INJECTION, SOLUTION INFILTRATION; PERINEURAL ONCE
Status: COMPLETED | OUTPATIENT
Start: 2023-10-23 | End: 2023-10-23

## 2023-10-23 RX ADMIN — TRIAMCINOLONE ACETONIDE 40 MG: 40 INJECTION, SUSPENSION INTRA-ARTICULAR; INTRAMUSCULAR at 16:35

## 2023-10-23 RX ADMIN — BUPIVACAINE HYDROCHLORIDE 5 MG: 2.5 INJECTION, SOLUTION INFILTRATION; PERINEURAL at 16:34

## 2023-10-23 NOTE — PROGRESS NOTES
[facility]   ORTHOPAEDIC SURGERY AND SPORTS MEDICINE  DATE OF VISIT: 10/23/23  Follow Up Visit     CHIEF COMPLAINT:   Chief Complaint   Patient presents with    Knee Pain     Left knee pain, pt is requesting a cortisone injection at today's visit. HPI:    Kaycee Denney is a 78y.o. year old male who presented to the office today for follow up of left knee osteoarthritis, previously evaluated on 6/27/2023. Previous treatment has included completion of viscosupplement injections. He reports symptoms are unchanged. The patient has not responded to the treatment. REVIEW OF SYSTEMS:     General: Negative Fever, chills, fatigue   Cardiovascular: Negative chest pain, palpitations  Respiratory: Equal chest expansion, negative shortness of breath   Skin: Negative rash, open wounds  Psych: Normal affect, mood stable  Neurologic: sensation grossly intact in extremities   Musculoskeletal: See HPI      Physical Exam:     Height: 1.803 m (5' 11\"), Weight - Scale: 113.4 kg (250 lb)    General: Alert and oriented x3, no acute distress  Cardiovascular/pulmonary: No labored breathing, peripheral perfusion intact  Musculoskeletal:    Left knee exam displays range of motion 5-120, tenderness across the anterior joint line. Stable valgus and varus stress testing. Skin is warm dry intact without swelling or effusion. Patella tracked midline with significant patellofemoral crepitus. Controlled Substances Monitoring:      Imaging:  Imaging reviewed display left knee end-stage osteoarthritis    Procedure Note: Knee Cortisone injection     The left knee was identified as the injection site. The risk and benefits of a cortisone injection were explained and the patient consented to the injection. Under sterile conditions, the knee was injected with a mixture of 40 mg of Kenalog and local anesthetic without complication. A sterile bandage was applied.     Administrations This Visit       bupivacaine (MARCAINE) 0.25 %

## 2023-11-02 ENCOUNTER — OFFICE VISIT (OUTPATIENT)
Dept: FAMILY MEDICINE CLINIC | Age: 78
End: 2023-11-02
Payer: MEDICARE

## 2023-11-02 VITALS
TEMPERATURE: 98.4 F | SYSTOLIC BLOOD PRESSURE: 128 MMHG | BODY MASS INDEX: 35.7 KG/M2 | HEIGHT: 71 IN | HEART RATE: 52 BPM | WEIGHT: 255 LBS | DIASTOLIC BLOOD PRESSURE: 64 MMHG | OXYGEN SATURATION: 99 % | RESPIRATION RATE: 18 BRPM

## 2023-11-02 DIAGNOSIS — E11.9 TYPE 2 DIABETES MELLITUS WITHOUT COMPLICATION, WITHOUT LONG-TERM CURRENT USE OF INSULIN (HCC): Primary | ICD-10-CM

## 2023-11-02 DIAGNOSIS — I48.0 PAROXYSMAL ATRIAL FIBRILLATION (HCC): ICD-10-CM

## 2023-11-02 DIAGNOSIS — I10 PRIMARY HYPERTENSION: ICD-10-CM

## 2023-11-02 DIAGNOSIS — G47.33 OBSTRUCTIVE SLEEP APNEA SYNDROME: ICD-10-CM

## 2023-11-02 DIAGNOSIS — I50.32 CHRONIC HEART FAILURE WITH PRESERVED EJECTION FRACTION (HCC): ICD-10-CM

## 2023-11-02 PROCEDURE — 1123F ACP DISCUSS/DSCN MKR DOCD: CPT

## 2023-11-02 PROCEDURE — 99214 OFFICE O/P EST MOD 30 MIN: CPT

## 2023-11-02 PROCEDURE — 3078F DIAST BP <80 MM HG: CPT

## 2023-11-02 PROCEDURE — 3051F HG A1C>EQUAL 7.0%<8.0%: CPT

## 2023-11-02 PROCEDURE — 3074F SYST BP LT 130 MM HG: CPT

## 2023-11-02 RX ORDER — OMEPRAZOLE 40 MG/1
1 CAPSULE, DELAYED RELEASE ORAL
COMMUNITY
Start: 2023-10-24 | End: 2023-11-23

## 2023-11-02 ASSESSMENT — ENCOUNTER SYMPTOMS
ABDOMINAL PAIN: 0
COUGH: 0
NAUSEA: 0
CHEST TIGHTNESS: 0
VOMITING: 0
BLOOD IN STOOL: 0
SHORTNESS OF BREATH: 0
DIARRHEA: 0
CONSTIPATION: 0

## 2023-11-02 NOTE — PROGRESS NOTES
replacement early next year     Recommendations:    Continue current medications. He and his daughter were advised on the need for surveillance TFTs, LFTs, PFTs, and eye exams while on amiodarone  He continues to have exertional dyspnea despite maintaining SR: will check echo to rule out progression of valve disease or other structural changes   He was also counseled to use his rescue inhaler when he becomes short of breath. His chest discomfort is atypical for angina, although it is difficulty to elicit history. He had low risk stress test in September 2022 and is less active now due to his knee pain. He was given a prescription for sublingual nitroglycerin and instructed on its use, as he is known to have coronary disease. He was encouraged to continue nightly use of Cpap, elevate his legs and wear compression stockings. Return office visit in two months or as needed: he and his daughter were asked to contact us with questions or concerns prior to then. The patient's current medication list, allergies, problem list and results of all previously ordered testing were reviewed at today's visit.     KELLIE Lawler  [facility]) Cardiology

## 2023-11-02 NOTE — PROGRESS NOTES
[facility]  Department of Family Medicine  Family Medicine Residency Program      Patient: Romain Gomez 78 y.o. male     Date of Service: 11/2/23      Chief complaint:   Chief Complaint   Patient presents with    Atrial Fibrillation     Cardiology appt tomorrow     Diabetes     150-140 tests at home       HISTORY OF PRESENTING ILLNESS     78 y.o. male presented to the clinic for a follow up for diabetes and a fib. DM-  last A1C 8/28/23 7.3%, slightly increased from 2/27/23 at 6.9%. Symptoms include very rare lightheadedness. Checking sugars at home 120-150. Diet- has gained some weight ~10 pounds. Trying to lose more weight. Really watches his sugars and salt intake. Meds- no meds. FLORIAN- Using CPAP, recently got new one. No new headaches. Afib-  on DOAC, no bleeding reported. Cardioverted 9/12/23. Has cardiology appt tomorrow. Started on amiodarone. Continued on Toprol. Last TSH and T4 WNL. Symptoms include SOB on exertion. Has not felt a lot of pain and palpitations. Looking to get knee replaced, should be on 1/23/24. Pulse ox 100%. [id number] Score for Atrial Fibrillation Stroke Risk   Risk   Factors  Component Value   C CHF Yes 1   H HTN Yes 1   A2 Age >= 76 Yes,  (78 y.o.) 2   D DM Yes 1   S2 Prior Stroke/TIA Yes 2   V Vascular Disease No 0   A Age 77-78 No,  (78 y.o.) 0   Sc Sex male 0    [id number]  Score  7   Score last updated 11/2/23 4:04 AM EDT    Click here for a link to the UpToDate guideline \"Atrial Fibrillation: Anticoagulation therapy to prevent embolization    Disclaimer: Risk Score calculation is dependent on accuracy of patient problem list and past encounter diagnosis.         Health Maintenance:  Health Maintenance Due   Topic Date Due    DTaP/Tdap/Td vaccine (1 - Tdap) Never done    Shingles vaccine (1 of 2) Never done    Hepatitis B vaccine (1 of 3 - Risk 3-dose series) Never done    COVID-19 Vaccine (5 - Moderna series) 04/30/2023    Annual Wellness

## 2023-11-03 ENCOUNTER — OFFICE VISIT (OUTPATIENT)
Dept: CARDIOLOGY CLINIC | Age: 78
End: 2023-11-03
Payer: MEDICARE

## 2023-11-03 VITALS
HEIGHT: 71 IN | RESPIRATION RATE: 18 BRPM | SYSTOLIC BLOOD PRESSURE: 132 MMHG | BODY MASS INDEX: 35.61 KG/M2 | WEIGHT: 254.4 LBS | HEART RATE: 55 BPM | DIASTOLIC BLOOD PRESSURE: 74 MMHG

## 2023-11-03 DIAGNOSIS — R06.09 DYSPNEA ON EXERTION: ICD-10-CM

## 2023-11-03 DIAGNOSIS — I25.10 CORONARY ARTERY DISEASE INVOLVING NATIVE CORONARY ARTERY OF NATIVE HEART WITHOUT ANGINA PECTORIS: Primary | ICD-10-CM

## 2023-11-03 PROCEDURE — 3078F DIAST BP <80 MM HG: CPT | Performed by: CLINICAL NURSE SPECIALIST

## 2023-11-03 PROCEDURE — 99214 OFFICE O/P EST MOD 30 MIN: CPT | Performed by: CLINICAL NURSE SPECIALIST

## 2023-11-03 PROCEDURE — 1123F ACP DISCUSS/DSCN MKR DOCD: CPT | Performed by: CLINICAL NURSE SPECIALIST

## 2023-11-03 PROCEDURE — 3075F SYST BP GE 130 - 139MM HG: CPT | Performed by: CLINICAL NURSE SPECIALIST

## 2023-11-03 RX ORDER — NITROGLYCERIN 0.4 MG/1
0.4 TABLET SUBLINGUAL EVERY 5 MIN PRN
Qty: 25 TABLET | Refills: 3 | Status: SHIPPED | OUTPATIENT
Start: 2023-11-03

## 2023-11-03 RX ORDER — NITROGLYCERIN 0.4 MG/1
0.4 TABLET SUBLINGUAL EVERY 5 MIN PRN
Qty: 25 TABLET | Refills: 0 | Status: SHIPPED | OUTPATIENT
Start: 2023-11-03

## 2023-11-03 NOTE — PATIENT INSTRUCTIONS
Elevate legs. Wear compression stockings. Continue current medications for now. Use your albuterol inhaler when you get short of breath     Take the nitroglycerin under your tongue when you have chest pain >>> let us know if this helps    Please let us know if you gain more than two pounds in one day or five pounds in two days.      We will call to schedule the echocardiogram

## 2023-11-06 DIAGNOSIS — R06.2 WHEEZING: ICD-10-CM

## 2023-11-09 RX ORDER — ALBUTEROL SULFATE 90 UG/1
AEROSOL, METERED RESPIRATORY (INHALATION)
Qty: 72 G | Refills: 3 | Status: SHIPPED | OUTPATIENT
Start: 2023-11-09

## 2023-11-13 RX ORDER — TAMSULOSIN HYDROCHLORIDE 0.4 MG/1
0.4 CAPSULE ORAL DAILY
Qty: 60 CAPSULE | Refills: 0 | Status: SHIPPED
Start: 2023-11-13 | End: 2023-12-07

## 2023-11-27 SDOH — HEALTH STABILITY: PHYSICAL HEALTH: ON AVERAGE, HOW MANY DAYS PER WEEK DO YOU ENGAGE IN MODERATE TO STRENUOUS EXERCISE (LIKE A BRISK WALK)?: 1 DAY

## 2023-11-27 SDOH — HEALTH STABILITY: PHYSICAL HEALTH: ON AVERAGE, HOW MANY MINUTES DO YOU ENGAGE IN EXERCISE AT THIS LEVEL?: 0 MIN

## 2023-11-27 ASSESSMENT — PATIENT HEALTH QUESTIONNAIRE - PHQ9
SUM OF ALL RESPONSES TO PHQ QUESTIONS 1-9: 0
SUM OF ALL RESPONSES TO PHQ9 QUESTIONS 1 & 2: 0
SUM OF ALL RESPONSES TO PHQ QUESTIONS 1-9: 0
SUM OF ALL RESPONSES TO PHQ QUESTIONS 1-9: 0
1. LITTLE INTEREST OR PLEASURE IN DOING THINGS: 0
2. FEELING DOWN, DEPRESSED OR HOPELESS: 0
SUM OF ALL RESPONSES TO PHQ QUESTIONS 1-9: 0

## 2023-11-27 ASSESSMENT — LIFESTYLE VARIABLES
HOW OFTEN DO YOU HAVE A DRINK CONTAINING ALCOHOL: NEVER
HOW MANY STANDARD DRINKS CONTAINING ALCOHOL DO YOU HAVE ON A TYPICAL DAY: PATIENT DOES NOT DRINK
HOW MANY STANDARD DRINKS CONTAINING ALCOHOL DO YOU HAVE ON A TYPICAL DAY: 0
HOW OFTEN DO YOU HAVE A DRINK CONTAINING ALCOHOL: 1
HOW OFTEN DO YOU HAVE SIX OR MORE DRINKS ON ONE OCCASION: 1

## 2023-11-30 ENCOUNTER — OFFICE VISIT (OUTPATIENT)
Dept: FAMILY MEDICINE CLINIC | Age: 78
End: 2023-11-30
Payer: MEDICARE

## 2023-11-30 VITALS
DIASTOLIC BLOOD PRESSURE: 59 MMHG | RESPIRATION RATE: 16 BRPM | OXYGEN SATURATION: 99 % | TEMPERATURE: 98.1 F | SYSTOLIC BLOOD PRESSURE: 114 MMHG | HEIGHT: 71 IN | WEIGHT: 257 LBS | HEART RATE: 61 BPM | BODY MASS INDEX: 35.98 KG/M2

## 2023-11-30 DIAGNOSIS — R41.3 MEMORY CHANGE: ICD-10-CM

## 2023-11-30 DIAGNOSIS — R60.9 DEPENDENT EDEMA: ICD-10-CM

## 2023-11-30 DIAGNOSIS — E11.9 TYPE 2 DIABETES MELLITUS WITHOUT COMPLICATION, WITHOUT LONG-TERM CURRENT USE OF INSULIN (HCC): ICD-10-CM

## 2023-11-30 DIAGNOSIS — Z00.00 MEDICARE ANNUAL WELLNESS VISIT, SUBSEQUENT: Primary | ICD-10-CM

## 2023-11-30 PROCEDURE — 1123F ACP DISCUSS/DSCN MKR DOCD: CPT | Performed by: FAMILY MEDICINE

## 2023-11-30 PROCEDURE — 3074F SYST BP LT 130 MM HG: CPT | Performed by: FAMILY MEDICINE

## 2023-11-30 PROCEDURE — 3051F HG A1C>EQUAL 7.0%<8.0%: CPT | Performed by: FAMILY MEDICINE

## 2023-11-30 PROCEDURE — 3078F DIAST BP <80 MM HG: CPT | Performed by: FAMILY MEDICINE

## 2023-11-30 PROCEDURE — G0439 PPPS, SUBSEQ VISIT: HCPCS | Performed by: FAMILY MEDICINE

## 2023-11-30 RX ORDER — DONEPEZIL HYDROCHLORIDE 5 MG/1
5 TABLET, FILM COATED ORAL NIGHTLY
Qty: 90 TABLET | Refills: 0 | Status: SHIPPED | OUTPATIENT
Start: 2023-11-30

## 2023-11-30 NOTE — PROGRESS NOTES
Calf size 43cm Yes Que Perera MD   donepezil (ARICEPT) 5 MG tablet Take 1 tablet by mouth nightly Yes Que Perera MD   tamsulosin (FLOMAX) 0.4 MG capsule TAKE 1 CAPSULE BY MOUTH DAILY Yes Kevin Aguillon MD   VENTOLIN  (90 Base) MCG/ACT inhaler USE 2 INHALATIONS BY MOUTH 4  TIMES DAILY AS NEEDED FOR  WHEEZING Yes Marilynn Elkins MD   nitroGLYCERIN (NITROSTAT) 0.4 MG SL tablet Place 1 tablet under the tongue every 5 minutes as needed for Chest pain May take up to three tablets. If no relief in pain after three pills, call 911 Yes WALI Cole - CNS   blood glucose monitor strips Test daily times a day & as needed for symptoms of irregular blood glucose. Dispense sufficient amount for indicated testing frequency plus additional to accommodate PRN testing needs.  Yes Que Perera MD   glucose monitoring kit 1 kit by Does not apply route daily Yes Marilynn Elkins MD   Lancets MISC Check GLC once daily Yes Marilynn Elkins MD   apixaban (ELIQUIS) 5 MG TABS tablet Take 1 tablet by mouth 2 times daily Yes Que Perera MD   tiotropium (Verlie ) 18 MCG inhalation capsule Inhale 1 capsule into the lungs daily Yes Que Perera MD   amiodarone (CORDARONE) 200 MG tablet Take 1 tablet by mouth daily Yes Tangela Marino MD   furosemide (LASIX) 40 MG tablet Take 1 tablet by mouth daily Yes Que Perera MD   atorvastatin (LIPITOR) 40 MG tablet Take 1 tablet by mouth daily Yes Tyler Navarrete MD   metoprolol succinate (TOPROL XL) 50 MG extended release tablet Take 1 tablet by mouth daily Yes Tyler Navarrete MD   Elastic Bandages & Supports ([address] ) [facility] 2 each by Does not apply route daily Dispense one pair of compression stockings 20/30 mmHg  Ankle size 25cm  Calf size 43cm Yes Tyler Navarrete MD       [employer] (Including outside providers/suppliers regularly involved in providing care):   Patient Care

## 2023-12-07 RX ORDER — TAMSULOSIN HYDROCHLORIDE 0.4 MG/1
0.4 CAPSULE ORAL DAILY
Qty: 90 CAPSULE | Refills: 3 | Status: SHIPPED | OUTPATIENT
Start: 2023-12-07

## 2023-12-12 RX ORDER — TIOTROPIUM BROMIDE 18 UG/1
CAPSULE ORAL; RESPIRATORY (INHALATION)
Qty: 90 CAPSULE | Refills: 3 | Status: SHIPPED | OUTPATIENT
Start: 2023-12-12

## 2024-01-09 DIAGNOSIS — I25.10 CORONARY ARTERY DISEASE INVOLVING NATIVE CORONARY ARTERY OF NATIVE HEART, UNSPECIFIED WHETHER ANGINA PRESENT: ICD-10-CM

## 2024-01-09 DIAGNOSIS — I48.0 PAROXYSMAL ATRIAL FIBRILLATION (HCC): ICD-10-CM

## 2024-01-09 DIAGNOSIS — I50.32 CHRONIC DIASTOLIC HEART FAILURE (HCC): ICD-10-CM

## 2024-01-10 RX ORDER — ATORVASTATIN CALCIUM 40 MG/1
40 TABLET, FILM COATED ORAL DAILY
Qty: 90 TABLET | Refills: 3 | OUTPATIENT
Start: 2024-01-10

## 2024-01-10 RX ORDER — METOPROLOL SUCCINATE 50 MG/1
50 TABLET, EXTENDED RELEASE ORAL DAILY
Qty: 90 TABLET | Refills: 3 | Status: SHIPPED | OUTPATIENT
Start: 2024-01-10

## 2024-01-10 RX ORDER — ATORVASTATIN CALCIUM 40 MG/1
40 TABLET, FILM COATED ORAL DAILY
Qty: 90 TABLET | Refills: 3 | Status: SHIPPED | OUTPATIENT
Start: 2024-01-10

## 2024-01-10 RX ORDER — METOPROLOL SUCCINATE 50 MG/1
50 TABLET, EXTENDED RELEASE ORAL DAILY
Qty: 90 TABLET | Refills: 3 | OUTPATIENT
Start: 2024-01-10

## 2024-01-10 RX ORDER — FUROSEMIDE 40 MG/1
40 TABLET ORAL DAILY
Qty: 90 TABLET | Refills: 3 | Status: SHIPPED | OUTPATIENT
Start: 2024-01-10

## 2024-01-12 ENCOUNTER — HOSPITAL ENCOUNTER (OUTPATIENT)
Dept: CARDIOLOGY | Age: 79
End: 2024-01-12
Payer: MEDICARE

## 2024-01-12 VITALS
DIASTOLIC BLOOD PRESSURE: 60 MMHG | WEIGHT: 257 LBS | SYSTOLIC BLOOD PRESSURE: 114 MMHG | HEIGHT: 70 IN | BODY MASS INDEX: 36.79 KG/M2

## 2024-01-12 DIAGNOSIS — R06.09 DYSPNEA ON EXERTION: ICD-10-CM

## 2024-01-12 DIAGNOSIS — I25.10 CORONARY ARTERY DISEASE INVOLVING NATIVE CORONARY ARTERY OF NATIVE HEART WITHOUT ANGINA PECTORIS: ICD-10-CM

## 2024-01-12 LAB
ECHO AO ASC DIAM: 3.3 CM
ECHO AO ASCENDING AORTA INDEX: 1.42 CM/M2
ECHO AR MAX VEL PISA: 3.7 M/S
ECHO AV AREA PEAK VELOCITY: 1 CM2
ECHO AV AREA VTI: 1 CM2
ECHO AV AREA/BSA PEAK VELOCITY: 0.4 CM2/M2
ECHO AV AREA/BSA VTI: 0.4 CM2/M2
ECHO AV CUSP MM: 1.9 CM
ECHO AV MEAN GRADIENT: 22 MMHG
ECHO AV MEAN VELOCITY: 2.3 M/S
ECHO AV PEAK GRADIENT: 38 MMHG
ECHO AV PEAK VELOCITY: 3.1 M/S
ECHO AV REGURGITANT PHT: 394.2 MILLISECOND
ECHO AV VELOCITY RATIO: 0.26
ECHO AV VTI: 77.3 CM
ECHO BSA: 2.4 M2
ECHO LA DIAMETER INDEX: 1.68 CM/M2
ECHO LA DIAMETER: 3.9 CM
ECHO LA VOL A-L A2C: 81 ML (ref 18–58)
ECHO LA VOL A-L A4C: 92 ML (ref 18–58)
ECHO LA VOL MOD A2C: 75 ML (ref 18–58)
ECHO LA VOL MOD A4C: 81 ML (ref 18–58)
ECHO LA VOLUME AREA LENGTH: 90 ML
ECHO LA VOLUME INDEX A-L A2C: 35 ML/M2 (ref 16–34)
ECHO LA VOLUME INDEX A-L A4C: 40 ML/M2 (ref 16–34)
ECHO LA VOLUME INDEX AREA LENGTH: 39 ML/M2 (ref 16–34)
ECHO LA VOLUME INDEX MOD A2C: 32 ML/M2 (ref 16–34)
ECHO LA VOLUME INDEX MOD A4C: 35 ML/M2 (ref 16–34)
ECHO LV FRACTIONAL SHORTENING: 33 % (ref 28–44)
ECHO LV INTERNAL DIMENSION DIASTOLE INDEX: 2.33 CM/M2
ECHO LV INTERNAL DIMENSION DIASTOLIC: 5.4 CM (ref 4.2–5.9)
ECHO LV INTERNAL DIMENSION SYSTOLIC INDEX: 1.55 CM/M2
ECHO LV INTERNAL DIMENSION SYSTOLIC: 3.6 CM
ECHO LV ISOVOLUMETRIC RELAXATION TIME (IVRT): 80.7 MS
ECHO LV IVSD: 0.9 CM (ref 0.6–1)
ECHO LV IVSS: 1.4 CM
ECHO LV MASS 2D: 180.1 G (ref 88–224)
ECHO LV MASS INDEX 2D: 77.6 G/M2 (ref 49–115)
ECHO LV POSTERIOR WALL DIASTOLIC: 0.9 CM (ref 0.6–1)
ECHO LV POSTERIOR WALL SYSTOLIC: 1.4 CM
ECHO LV RELATIVE WALL THICKNESS RATIO: 0.33
ECHO LVOT AREA: 3.8 CM2
ECHO LVOT AV VTI INDEX: 0.25
ECHO LVOT DIAM: 2.2 CM
ECHO LVOT MEAN GRADIENT: 1 MMHG
ECHO LVOT PEAK GRADIENT: 3 MMHG
ECHO LVOT PEAK VELOCITY: 0.8 M/S
ECHO LVOT STROKE VOLUME INDEX: 31.6 ML/M2
ECHO LVOT SV: 73.3 ML
ECHO LVOT VTI: 19.3 CM
ECHO MV "A" WAVE DURATION: 90 MSEC
ECHO MV A VELOCITY: 1.03 M/S
ECHO MV AREA PHT: 1.7 CM2
ECHO MV AREA VTI: 1.6 CM2
ECHO MV E DECELERATION TIME (DT): 174.8 MS
ECHO MV E VELOCITY: 1.07 M/S
ECHO MV E/A RATIO: 1.04
ECHO MV EROA PISA: 0.2 CM2
ECHO MV LVOT VTI INDEX: 2.38
ECHO MV MAX VELOCITY: 1.1 M/S
ECHO MV MEAN GRADIENT: 2 MMHG
ECHO MV MEAN VELOCITY: 0.6 M/S
ECHO MV PEAK GRADIENT: 5 MMHG
ECHO MV PRESSURE HALF TIME (PHT): 131.1 MS
ECHO MV REGURGITANT ALIASING (NYQUIST) VELOCITY: 31 CM/S
ECHO MV REGURGITANT RADIUS PISA: 0.66 CM
ECHO MV REGURGITANT VELOCITY PISA: 5.1 M/S
ECHO MV REGURGITANT VOLUME PISA: 31.26 ML
ECHO MV REGURGITANT VTIA: 188 CM
ECHO MV VTI: 45.9 CM
ECHO PV MAX VELOCITY: 1 M/S
ECHO PV MEAN GRADIENT: 2 MMHG
ECHO PV MEAN VELOCITY: 0.6 M/S
ECHO PV PEAK GRADIENT: 4 MMHG
ECHO PV VTI: 23.3 CM
ECHO PVEIN A DURATION: 121.1 MS
ECHO PVEIN A VELOCITY: 0.2 M/S
ECHO PVEIN PEAK D VELOCITY: 0.6 M/S
ECHO PVEIN PEAK S VELOCITY: 0.6 M/S
ECHO PVEIN S/D RATIO: 1

## 2024-01-12 PROCEDURE — 93306 TTE W/DOPPLER COMPLETE: CPT

## 2024-01-18 ENCOUNTER — OFFICE VISIT (OUTPATIENT)
Dept: FAMILY MEDICINE CLINIC | Age: 79
End: 2024-01-18
Payer: MEDICARE

## 2024-01-18 VITALS
HEART RATE: 66 BPM | SYSTOLIC BLOOD PRESSURE: 117 MMHG | HEIGHT: 71 IN | RESPIRATION RATE: 18 BRPM | TEMPERATURE: 97.6 F | WEIGHT: 259 LBS | DIASTOLIC BLOOD PRESSURE: 67 MMHG | OXYGEN SATURATION: 96 % | BODY MASS INDEX: 36.26 KG/M2

## 2024-01-18 DIAGNOSIS — E11.9 TYPE 2 DIABETES MELLITUS WITHOUT COMPLICATION, WITHOUT LONG-TERM CURRENT USE OF INSULIN (HCC): Primary | ICD-10-CM

## 2024-01-18 DIAGNOSIS — R41.3 MEMORY CHANGE: ICD-10-CM

## 2024-01-18 LAB — HBA1C MFR BLD: 7.2 %

## 2024-01-18 PROCEDURE — 1123F ACP DISCUSS/DSCN MKR DOCD: CPT | Performed by: FAMILY MEDICINE

## 2024-01-18 PROCEDURE — 3051F HG A1C>EQUAL 7.0%<8.0%: CPT | Performed by: FAMILY MEDICINE

## 2024-01-18 PROCEDURE — 3074F SYST BP LT 130 MM HG: CPT | Performed by: FAMILY MEDICINE

## 2024-01-18 PROCEDURE — 3078F DIAST BP <80 MM HG: CPT | Performed by: FAMILY MEDICINE

## 2024-01-18 PROCEDURE — 83036 HEMOGLOBIN GLYCOSYLATED A1C: CPT | Performed by: FAMILY MEDICINE

## 2024-01-18 PROCEDURE — 99214 OFFICE O/P EST MOD 30 MIN: CPT | Performed by: FAMILY MEDICINE

## 2024-01-18 ASSESSMENT — PATIENT HEALTH QUESTIONNAIRE - PHQ9
2. FEELING DOWN, DEPRESSED OR HOPELESS: 0
SUM OF ALL RESPONSES TO PHQ9 QUESTIONS 1 & 2: 0
SUM OF ALL RESPONSES TO PHQ QUESTIONS 1-9: 0
1. LITTLE INTEREST OR PLEASURE IN DOING THINGS: 0

## 2024-01-18 NOTE — PROGRESS NOTES
tamsulosin (FLOMAX) 0.4 MG capsule TAKE 1 CAPSULE BY MOUTH DAILY 90 capsule 3    Compression Stockings MISC by Does not apply route Dispense one pair of compression stockings 20/30 mmHg    Ankle size 25cm  Calf size 43cm 1 each 0    donepezil (ARICEPT) 5 MG tablet Take 1 tablet by mouth nightly 90 tablet 0    VENTOLIN  (90 Base) MCG/ACT inhaler USE 2 INHALATIONS BY MOUTH 4  TIMES DAILY AS NEEDED FOR  WHEEZING 72 g 3    nitroGLYCERIN (NITROSTAT) 0.4 MG SL tablet Place 1 tablet under the tongue every 5 minutes as needed for Chest pain May take up to three tablets. If no relief in pain after three pills, call 911 25 tablet 3    blood glucose monitor strips Test daily times a day & as needed for symptoms of irregular blood glucose. Dispense sufficient amount for indicated testing frequency plus additional to accommodate PRN testing needs. 100 strip 5    glucose monitoring kit 1 kit by Does not apply route daily 1 kit 0    Lancets MISC Check GLC once daily 100 each 5    apixaban (ELIQUIS) 5 MG TABS tablet Take 1 tablet by mouth 2 times daily 180 tablet 3    amiodarone (CORDARONE) 200 MG tablet Take 1 tablet by mouth daily 30 tablet 5    Elastic Bandages & Supports (MEDICAL COMPRESSION STOCKINGS) MISC 2 each by Does not apply route daily Dispense one pair of compression stockings 20/30 mmHg  Ankle size 25cm  Calf size 43cm 1 each 0     No current facility-administered medications for this visit.        Abhinav Elkins MD       This document may have been prepared at least partially through the use of voice recognition software. Although effort is taken to assure the accuracy ofthis document, it is possible that grammatical, syntax,  or spelling errors may occur.

## 2024-01-23 ENCOUNTER — OFFICE VISIT (OUTPATIENT)
Dept: CARDIOLOGY CLINIC | Age: 79
End: 2024-01-23
Payer: MEDICARE

## 2024-01-23 ENCOUNTER — TELEPHONE (OUTPATIENT)
Dept: ORTHOPEDIC SURGERY | Age: 79
End: 2024-01-23

## 2024-01-23 ENCOUNTER — TELEPHONE (OUTPATIENT)
Dept: CARDIOLOGY CLINIC | Age: 79
End: 2024-01-23

## 2024-01-23 VITALS
BODY MASS INDEX: 36.62 KG/M2 | HEART RATE: 67 BPM | SYSTOLIC BLOOD PRESSURE: 124 MMHG | RESPIRATION RATE: 18 BRPM | HEIGHT: 71 IN | DIASTOLIC BLOOD PRESSURE: 62 MMHG | WEIGHT: 261.6 LBS

## 2024-01-23 DIAGNOSIS — I48.0 PAROXYSMAL ATRIAL FIBRILLATION (HCC): Primary | ICD-10-CM

## 2024-01-23 DIAGNOSIS — Z79.899 LONG TERM CURRENT USE OF AMIODARONE: ICD-10-CM

## 2024-01-23 DIAGNOSIS — I50.32 CHRONIC HEART FAILURE WITH PRESERVED EJECTION FRACTION (HCC): ICD-10-CM

## 2024-01-23 DIAGNOSIS — I50.32 CHRONIC DIASTOLIC CONGESTIVE HEART FAILURE (HCC): ICD-10-CM

## 2024-01-23 DIAGNOSIS — R07.9 CHEST PAIN, UNSPECIFIED TYPE: ICD-10-CM

## 2024-01-23 DIAGNOSIS — I25.119 CORONARY ARTERY DISEASE INVOLVING NATIVE CORONARY ARTERY OF NATIVE HEART WITH ANGINA PECTORIS (HCC): ICD-10-CM

## 2024-01-23 DIAGNOSIS — I35.0 AORTIC VALVE STENOSIS, ETIOLOGY OF CARDIAC VALVE DISEASE UNSPECIFIED: Primary | ICD-10-CM

## 2024-01-23 DIAGNOSIS — I35.0 NONRHEUMATIC AORTIC VALVE STENOSIS: ICD-10-CM

## 2024-01-23 DIAGNOSIS — G47.33 OSA (OBSTRUCTIVE SLEEP APNEA): ICD-10-CM

## 2024-01-23 DIAGNOSIS — Z79.01 ON APIXABAN THERAPY: ICD-10-CM

## 2024-01-23 DIAGNOSIS — N18.9 CHRONIC KIDNEY DISEASE, UNSPECIFIED CKD STAGE: ICD-10-CM

## 2024-01-23 DIAGNOSIS — I48.0 PAROXYSMAL ATRIAL FIBRILLATION (HCC): ICD-10-CM

## 2024-01-23 PROCEDURE — 1123F ACP DISCUSS/DSCN MKR DOCD: CPT | Performed by: INTERNAL MEDICINE

## 2024-01-23 PROCEDURE — 3078F DIAST BP <80 MM HG: CPT | Performed by: INTERNAL MEDICINE

## 2024-01-23 PROCEDURE — 99215 OFFICE O/P EST HI 40 MIN: CPT | Performed by: INTERNAL MEDICINE

## 2024-01-23 PROCEDURE — 3074F SYST BP LT 130 MM HG: CPT | Performed by: INTERNAL MEDICINE

## 2024-01-23 PROCEDURE — 93000 ELECTROCARDIOGRAM COMPLETE: CPT | Performed by: INTERNAL MEDICINE

## 2024-01-23 NOTE — TELEPHONE ENCOUNTER
1/23/23-CLINICALS FAXED.    1/25/24:  DINO Quezada, cardiac cath ([id number]) approved, auth #[id number], valid 1/24/24-3/9/24.

## 2024-01-23 NOTE — TELEPHONE ENCOUNTER
Patient needs heart cath for aortic stenosis pending prior auth. Thank you    CPT 31838  ICD Aortic Stenosis I35.0
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
## 2024-01-23 NOTE — TELEPHONE ENCOUNTER
Patient's daughter Gianna notified of heart cath date, time and instructions for cath. Verbalized understanding.

## 2024-01-23 NOTE — TELEPHONE ENCOUNTER
Patient's daughter called office, was last seen in office on 10/23/2023, they would like to proceed with left knee visco supplementation authorization.     Form filled out / process started for Left knee Gelsyn ~ 3 authorization

## 2024-01-26 ENCOUNTER — TELEPHONE (OUTPATIENT)
Dept: CARDIOLOGY CLINIC | Age: 79
End: 2024-01-26

## 2024-01-26 NOTE — TELEPHONE ENCOUNTER
VALVE CLINIC APPT    I called & spoke to Mason's daughter Gianna Villalobos to notify her that her father Mason has an appt at the [facility] Valve Clinic  Location: [facility] [place] [facility] [address], [place], [facility] Valery Burroughs   Date & time of Valve Clinic Appt: 3/7/24 at 12:00 pm  Arrival time: 11:30 am  [place] on first floor & then proceed to the second floor for the valve clinic appt  Bring a photo ID, insurance card & a list of medications with him.

## 2024-01-30 DIAGNOSIS — R41.3 MEMORY CHANGE: ICD-10-CM

## 2024-01-30 DIAGNOSIS — R06.09 DYSPNEA ON EXERTION: ICD-10-CM

## 2024-01-30 DIAGNOSIS — I25.10 CORONARY ARTERY DISEASE INVOLVING NATIVE CORONARY ARTERY OF NATIVE HEART WITHOUT ANGINA PECTORIS: ICD-10-CM

## 2024-01-30 RX ORDER — DONEPEZIL HYDROCHLORIDE 5 MG/1
5 TABLET, FILM COATED ORAL NIGHTLY
Qty: 90 TABLET | Refills: 0 | Status: CANCELLED | OUTPATIENT
Start: 2024-01-30

## 2024-01-31 ENCOUNTER — OFFICE VISIT (OUTPATIENT)
Dept: ORTHOPEDIC SURGERY | Age: 79
End: 2024-01-31
Payer: MEDICARE

## 2024-01-31 VITALS — WEIGHT: 261 LBS | HEIGHT: 71 IN | BODY MASS INDEX: 36.54 KG/M2

## 2024-01-31 DIAGNOSIS — M17.12 PRIMARY OSTEOARTHRITIS OF LEFT KNEE: Primary | ICD-10-CM

## 2024-01-31 PROCEDURE — 20610 DRAIN/INJ JOINT/BURSA W/O US: CPT | Performed by: NURSE PRACTITIONER

## 2024-01-31 PROCEDURE — 99999 PR OFFICE/OUTPT VISIT,PROCEDURE ONLY: CPT | Performed by: NURSE PRACTITIONER

## 2024-01-31 RX ORDER — DONEPEZIL HYDROCHLORIDE 10 MG/1
10 TABLET, FILM COATED ORAL NIGHTLY
Qty: 90 TABLET | Refills: 1 | Status: SHIPPED | OUTPATIENT
Start: 2024-01-31

## 2024-01-31 RX ORDER — DONEPEZIL HYDROCHLORIDE 5 MG/1
5 TABLET, FILM COATED ORAL NIGHTLY
Qty: 90 TABLET | Refills: 3 | OUTPATIENT
Start: 2024-01-31

## 2024-01-31 RX ORDER — NITROGLYCERIN 0.4 MG/1
TABLET SUBLINGUAL
Qty: 100 TABLET | Refills: 3 | Status: SHIPPED | OUTPATIENT
Start: 2024-01-31

## 2024-01-31 NOTE — PROGRESS NOTES
[facility]  ORTHOPAEDICS AND SPORTS MEDICINE  DATE OF VISIT: 01/31/24  Follow Up Visit for Visco Injection    CHIEF COMPLAINT:   Chief Complaint   Patient presents with    Knee Pain     Pt is here today for left knee Gelsyn injection # 1 of 3.          Mason Gudino returns for follow up visit for visco supplement injection 1.  He reports symptoms are unchanged    Physical Exam:   General: Alert and oriented x3, no acute distress  Cardiovascular/pulmonary: No labored breathing, peripheral perfusion intact  Musculoskeletal:    left knee:    Range of motion is functional 5-125  Patella is stable tracking midline  There is no laxity with varus/valgus stress at 0 and 30 degrees of flexion.    There is tenderness to palpation along the medial joint line.    There is no tenderness to palpation along the lateral joint line       Imaging: Reviewed     Procedure Note: Knee viscosupplementation injection     The left knee was identified as the injection site. The risk and benefits of injection were explained and the patient consented to the injection. Under sterile conditions, the knee was injected with a full dose of Gelsyn-3. A sterile bandage was applied.    Administrations This Visit       sodium hyaluronate (Viscosup) injection SOSY 16.8 mg       Admin Date  01/31/2024 Action  Given Dose  16.8 mg Route  Intra-artICUlar Administered By  Dallas Keith ATC                      Assessment/Plan: S/p left knee Gelsyn-3 injection #1 for osteoarthritis  -Continue activity modification/NSAIDS/ICE prn  -f/u next week for second injection      WALI Corbin-CNP  Orthopedic Surgery   01/31/24  3:00 PM

## 2024-02-05 ENCOUNTER — HOSPITAL ENCOUNTER (OUTPATIENT)
Age: 79
Discharge: HOME | End: 2024-02-05
Payer: MEDICARE

## 2024-02-05 DIAGNOSIS — I35.0 AORTIC VALVE STENOSIS, ETIOLOGY OF CARDIAC VALVE DISEASE UNSPECIFIED: ICD-10-CM

## 2024-02-05 DIAGNOSIS — I50.32 CHRONIC HEART FAILURE WITH PRESERVED EJECTION FRACTION (HCC): ICD-10-CM

## 2024-02-05 DIAGNOSIS — I48.0 PAROXYSMAL ATRIAL FIBRILLATION (HCC): ICD-10-CM

## 2024-02-05 LAB
ALBUMIN SERPL-MCNC: 4.1 G/DL (ref 3.5–5.2)
ALP SERPL-CCNC: 60 U/L (ref 40–129)
ALT SERPL-CCNC: 17 U/L (ref 0–40)
ANION GAP SERPL CALCULATED.3IONS-SCNC: 11 MMOL/L (ref 7–16)
AST SERPL-CCNC: 15 U/L (ref 0–39)
BILIRUB SERPL-MCNC: 0.4 MG/DL (ref 0–1.2)
BUN SERPL-MCNC: 27 MG/DL (ref 6–23)
CALCIUM SERPL-MCNC: 9.6 MG/DL (ref 8.6–10.2)
CHLORIDE SERPL-SCNC: 100 MMOL/L (ref 98–107)
CO2 SERPL-SCNC: 29 MMOL/L (ref 22–29)
CREAT SERPL-MCNC: 1.4 MG/DL (ref 0.7–1.2)
ERYTHROCYTE [DISTWIDTH] IN BLOOD BY AUTOMATED COUNT: 12.5 % (ref 11.5–15)
GFR SERPL CREATININE-BSD FRML MDRD: 50 ML/MIN/1.73M2
GLUCOSE SERPL-MCNC: 172 MG/DL (ref 74–99)
HCT VFR BLD AUTO: 39.2 % (ref 37–54)
HGB BLD-MCNC: 13.6 G/DL (ref 12.5–16.5)
INR PPP: 1.5
MCH RBC QN AUTO: 32.3 PG (ref 26–35)
MCHC RBC AUTO-ENTMCNC: 34.7 G/DL (ref 32–34.5)
MCV RBC AUTO: 93.1 FL (ref 80–99.9)
PARTIAL THROMBOPLASTIN TIME: 38.8 SEC (ref 24.5–35.1)
PLATELET # BLD AUTO: 294 K/UL (ref 130–450)
PMV BLD AUTO: 9.8 FL (ref 7–12)
POTASSIUM SERPL-SCNC: 4.6 MMOL/L (ref 3.5–5)
PROT SERPL-MCNC: 7.1 G/DL (ref 6.4–8.3)
PROTHROMBIN TIME: 16.2 SEC (ref 9.3–12.4)
RBC # BLD AUTO: 4.21 M/UL (ref 3.8–5.8)
SODIUM SERPL-SCNC: 140 MMOL/L (ref 132–146)
TSH SERPL DL<=0.05 MIU/L-ACNC: 1.15 UIU/ML (ref 0.27–4.2)
WBC OTHER # BLD: 9 K/UL (ref 4.5–11.5)

## 2024-02-05 PROCEDURE — 80053 COMPREHEN METABOLIC PANEL: CPT

## 2024-02-05 PROCEDURE — 85027 COMPLETE CBC AUTOMATED: CPT

## 2024-02-05 PROCEDURE — 36415 COLL VENOUS BLD VENIPUNCTURE: CPT

## 2024-02-05 PROCEDURE — 85610 PROTHROMBIN TIME: CPT

## 2024-02-05 PROCEDURE — 86850 RBC ANTIBODY SCREEN: CPT

## 2024-02-05 PROCEDURE — 86900 BLOOD TYPING SEROLOGIC ABO: CPT

## 2024-02-05 PROCEDURE — 85730 THROMBOPLASTIN TIME PARTIAL: CPT

## 2024-02-05 PROCEDURE — 86901 BLOOD TYPING SEROLOGIC RH(D): CPT

## 2024-02-05 PROCEDURE — 84443 ASSAY THYROID STIM HORMONE: CPT

## 2024-02-06 ENCOUNTER — HOSPITAL ENCOUNTER (OUTPATIENT)
Age: 79
Setting detail: OUTPATIENT SURGERY
Discharge: HOME | End: 2024-02-06
Attending: INTERNAL MEDICINE | Admitting: INTERNAL MEDICINE
Payer: MEDICARE

## 2024-02-06 VITALS
DIASTOLIC BLOOD PRESSURE: 70 MMHG | BODY MASS INDEX: 35 KG/M2 | RESPIRATION RATE: 18 BRPM | OXYGEN SATURATION: 96 % | TEMPERATURE: 98 F | SYSTOLIC BLOOD PRESSURE: 116 MMHG | HEART RATE: 62 BPM | HEIGHT: 71 IN | WEIGHT: 250 LBS

## 2024-02-06 DIAGNOSIS — I35.0 NODULAR CALCIFIC AORTIC VALVE STENOSIS: ICD-10-CM

## 2024-02-06 LAB
ABO + RH BLD: NORMAL
ABO + RH BLD: NORMAL
ARM BAND NUMBER: NORMAL
ARM BAND NUMBER: NORMAL
BLOOD BANK SAMPLE EXPIRATION: NORMAL
BLOOD BANK SAMPLE EXPIRATION: NORMAL
BLOOD GROUP ANTIBODIES SERPL: NEGATIVE
BLOOD GROUP ANTIBODIES SERPL: NEGATIVE
ECHO BSA: 2.38 M2

## 2024-02-06 PROCEDURE — 6360000002 HC RX W HCPCS: Performed by: INTERNAL MEDICINE

## 2024-02-06 PROCEDURE — C1769 GUIDE WIRE: HCPCS | Performed by: INTERNAL MEDICINE

## 2024-02-06 PROCEDURE — 2500000003 HC RX 250 WO HCPCS: Performed by: INTERNAL MEDICINE

## 2024-02-06 PROCEDURE — C1894 INTRO/SHEATH, NON-LASER: HCPCS | Performed by: INTERNAL MEDICINE

## 2024-02-06 PROCEDURE — 93458 L HRT ARTERY/VENTRICLE ANGIO: CPT | Performed by: INTERNAL MEDICINE

## 2024-02-06 PROCEDURE — 7100000011 HC PHASE II RECOVERY - ADDTL 15 MIN: Performed by: INTERNAL MEDICINE

## 2024-02-06 PROCEDURE — 93454 CORONARY ARTERY ANGIO S&I: CPT | Performed by: INTERNAL MEDICINE

## 2024-02-06 PROCEDURE — 99152 MOD SED SAME PHYS/QHP 5/>YRS: CPT | Performed by: INTERNAL MEDICINE

## 2024-02-06 PROCEDURE — 2580000003 HC RX 258: Performed by: INTERNAL MEDICINE

## 2024-02-06 PROCEDURE — 6360000004 HC RX CONTRAST MEDICATION: Performed by: INTERNAL MEDICINE

## 2024-02-06 PROCEDURE — 7100000010 HC PHASE II RECOVERY - FIRST 15 MIN: Performed by: INTERNAL MEDICINE

## 2024-02-06 PROCEDURE — 86901 BLOOD TYPING SEROLOGIC RH(D): CPT

## 2024-02-06 PROCEDURE — 2709999900 HC NON-CHARGEABLE SUPPLY: Performed by: INTERNAL MEDICINE

## 2024-02-06 PROCEDURE — 86850 RBC ANTIBODY SCREEN: CPT

## 2024-02-06 PROCEDURE — 86900 BLOOD TYPING SEROLOGIC ABO: CPT

## 2024-02-06 RX ORDER — VERAPAMIL HYDROCHLORIDE 2.5 MG/ML
INJECTION, SOLUTION INTRAVENOUS PRN
Status: DISCONTINUED | OUTPATIENT
Start: 2024-02-06 | End: 2024-02-06 | Stop reason: HOSPADM

## 2024-02-06 RX ORDER — AMIODARONE HYDROCHLORIDE 200 MG/1
200 TABLET ORAL DAILY
Qty: 90 TABLET | Refills: 3 | Status: SHIPPED | OUTPATIENT
Start: 2024-02-06

## 2024-02-06 RX ORDER — FENTANYL CITRATE 50 UG/ML
INJECTION, SOLUTION INTRAMUSCULAR; INTRAVENOUS PRN
Status: DISCONTINUED | OUTPATIENT
Start: 2024-02-06 | End: 2024-02-06 | Stop reason: HOSPADM

## 2024-02-06 RX ORDER — SODIUM CHLORIDE 0.9 % (FLUSH) 0.9 %
5-40 SYRINGE (ML) INJECTION EVERY 12 HOURS SCHEDULED
Status: DISCONTINUED | OUTPATIENT
Start: 2024-02-06 | End: 2024-02-06 | Stop reason: HOSPADM

## 2024-02-06 RX ORDER — ACETAMINOPHEN 325 MG/1
650 TABLET ORAL EVERY 4 HOURS PRN
Status: DISCONTINUED | OUTPATIENT
Start: 2024-02-06 | End: 2024-02-06 | Stop reason: HOSPADM

## 2024-02-06 RX ORDER — MIDAZOLAM HYDROCHLORIDE 1 MG/ML
INJECTION INTRAMUSCULAR; INTRAVENOUS PRN
Status: DISCONTINUED | OUTPATIENT
Start: 2024-02-06 | End: 2024-02-06 | Stop reason: HOSPADM

## 2024-02-06 RX ORDER — SODIUM CHLORIDE 9 MG/ML
INJECTION, SOLUTION INTRAVENOUS CONTINUOUS PRN
Status: COMPLETED | OUTPATIENT
Start: 2024-02-06 | End: 2024-02-06

## 2024-02-06 RX ORDER — SODIUM CHLORIDE 9 MG/ML
INJECTION, SOLUTION INTRAVENOUS PRN
Status: DISCONTINUED | OUTPATIENT
Start: 2024-02-06 | End: 2024-02-06 | Stop reason: HOSPADM

## 2024-02-06 RX ORDER — SODIUM CHLORIDE 0.9 % (FLUSH) 0.9 %
5-40 SYRINGE (ML) INJECTION PRN
Status: DISCONTINUED | OUTPATIENT
Start: 2024-02-06 | End: 2024-02-06 | Stop reason: HOSPADM

## 2024-02-06 RX ORDER — NITROGLYCERIN 20 MG/100ML
INJECTION INTRAVENOUS CONTINUOUS PRN
Status: COMPLETED | OUTPATIENT
Start: 2024-02-06 | End: 2024-02-06

## 2024-02-06 RX ORDER — HEPARIN SODIUM 10000 [USP'U]/ML
INJECTION, SOLUTION INTRAVENOUS; SUBCUTANEOUS PRN
Status: DISCONTINUED | OUTPATIENT
Start: 2024-02-06 | End: 2024-02-06 | Stop reason: HOSPADM

## 2024-02-06 RX ORDER — SODIUM CHLORIDE 9 MG/ML
INJECTION, SOLUTION INTRAVENOUS ONCE
Status: DISCONTINUED | OUTPATIENT
Start: 2024-02-06 | End: 2024-02-06 | Stop reason: HOSPADM

## 2024-02-06 NOTE — DISCHARGE INSTRUCTIONS
POST-CATH  RADIAL DISCHARGE INSTRUCTIONS:    Please follow instructions closely for prevention of complications.     INSTRUCTIONS FOR CARE OF CATHETERIZATION SITE: RADIAL (WRIST) APPROACH:    DO NOT BEND wrist for 48 hours  DO NOT PUSH with affected wrist for 48 hrs.  DO NOT submerge wrist in water for 3 days  NO blood pressure, IV or blood work in affected arm for 3- 5 days    KEEP SPLINT (ARMBOARD) ON UNTIL TOMORROW MORNING  Remove dressing from wrist tomorrow morning. Gently cleanse, pat dry, apply band aid for 24 hours.    Call your physician if you notice:      *Increase in pain at catheterization site      *Increase in swelling at catheterization site      *Redness or drainage at the catheterization site      *Numbness, tingling or cramping in the catheterization arm at rest or with activity    CALL 911 if you have active bleeding from your catheterization site.   DO NOT DRIVE YOURSELF TO THE HOSPITAL    Drink 3-4 extra glasses of water today    No driving, or working for 48 hrs    Continue low fat diet.

## 2024-02-06 NOTE — PROGRESS NOTES
Discharge instructions provided. Follow up, medications and cath site care discussed. Patient and son verbalized understanding

## 2024-02-08 ENCOUNTER — NURSE ONLY (OUTPATIENT)
Dept: ORTHOPEDIC SURGERY | Age: 79
End: 2024-02-08
Payer: MEDICARE

## 2024-02-08 DIAGNOSIS — M17.12 PRIMARY OSTEOARTHRITIS OF LEFT KNEE: Primary | ICD-10-CM

## 2024-02-08 PROCEDURE — 20610 DRAIN/INJ JOINT/BURSA W/O US: CPT | Performed by: NURSE PRACTITIONER

## 2024-02-08 NOTE — PROGRESS NOTES
[facility]  ORTHOPAEDICS AND SPORTS MEDICINE  DATE OF VISIT: 02/08/24  Follow Up Visit for Visco Injection    CHIEF COMPLAINT:   Chief Complaint   Patient presents with    Injections     L knee Gelsyn 2/3 -- pt reports that he had improvement with the inj, but 3 days after his first injection he twisted his L knee. Patient is requesting a brace at todays visit.          Mason Gudino returns for follow up visit for visco supplement injection 2.  He reports symptoms are improved    Physical Exam:   General: Alert and oriented x3, no acute distress  Cardiovascular/pulmonary: No labored breathing, peripheral perfusion intact  Musculoskeletal:    left knee:    Range of motion is functional   Patella is stable tracking midline  There is no laxity with varus/valgus stress at 0 and 30 degrees of flexion.    There is no tenderness to palpation along the medial joint line.    There is no tenderness to palpation along the lateral joint line       Imaging: Reviewed     Procedure Note: Knee viscosupplementation injection     The left knee was identified as the injection site. The risk and benefits of injection were explained and the patient consented to the injection. Under sterile conditions, the knee was injected with a full dose of Gelsyn 3. A sterile bandage was applied.    Administrations This Visit       sodium hyaluronate (Viscosup) injection SOSY 16.8 mg       Admin Date  02/08/2024 Action  Given Dose  16.8 mg Route  Intra-artICUlar Administered By  Maura Owens CMA                      Assessment/Plan: S/p left knee Gelsyn 3 injection #2 for osteoarthritis  -Continue activity modification/NSAIDS/ICE   -Patient fitted for a Drytec hinged brace  -f/u in 2 weeks for #3 injection      WALI Corbin-CNP  Orthopedic Surgery   02/08/24  4:17 PM

## 2024-02-20 ENCOUNTER — NURSE ONLY (OUTPATIENT)
Dept: ORTHOPEDIC SURGERY | Age: 79
End: 2024-02-20
Payer: MEDICARE

## 2024-02-20 DIAGNOSIS — M17.12 PRIMARY OSTEOARTHRITIS OF LEFT KNEE: Primary | ICD-10-CM

## 2024-02-20 PROCEDURE — 20610 DRAIN/INJ JOINT/BURSA W/O US: CPT | Performed by: NURSE PRACTITIONER

## 2024-02-20 NOTE — PROGRESS NOTES
[facility]  ORTHOPAEDICS AND SPORTS MEDICINE  DATE OF VISIT: 02/20/24  Follow Up Visit for Visco Injection    CHIEF COMPLAINT:   Chief Complaint   Patient presents with    Injections     Gelsyn 3/3 -- pt reports  improvement with his pain somewhat, does state the brace he was given has helped tremendously.          Mason Gudino returns for follow up visit for visco supplement injection 3.  He reports symptoms are improved    Physical Exam:   General: Alert and oriented x3, no acute distress  Cardiovascular/pulmonary: No labored breathing, peripheral perfusion intact  Musculoskeletal:    left knee:    Range of motion is functional   Patella is stable tracking midline  There is no laxity with varus/valgus stress at 0 and 30 degrees of flexion.    There is no tenderness to palpation along the medial joint line.    There is no tenderness to palpation along the lateral joint line       Imaging: Reviewed     Procedure Note: Knee viscosupplementation injection     The left knee was identified as the injection site. The risk and benefits of injection were explained and the patient consented to the injection. Under sterile conditions, the knee was injected with a full dose of Gelsyn 3. A sterile bandage was applied.    Administrations This Visit       sodium hyaluronate (Viscosup) injection SOSY 16.8 mg       Admin Date  02/20/2024 Action  Given Dose  16.8 mg Route  Intra-artICUlar Administered By  Maura Owens CMA                      Assessment/Plan: S/p left knee Gelsyn 3 injection #3 for osteoarthritis  -Continue activity modification/NSAIDS/ICE prn  -follow up 6 months      WALI Corbin-CNP  Orthopedic Surgery   02/20/24  4:38 PM

## 2024-03-07 ENCOUNTER — OFFICE VISIT (OUTPATIENT)
Dept: CARDIOLOGY CLINIC | Age: 79
End: 2024-03-07
Payer: MEDICARE

## 2024-03-07 ENCOUNTER — OFFICE VISIT (OUTPATIENT)
Dept: CARDIOTHORACIC SURGERY | Age: 79
End: 2024-03-07

## 2024-03-07 VITALS
HEIGHT: 71 IN | SYSTOLIC BLOOD PRESSURE: 132 MMHG | DIASTOLIC BLOOD PRESSURE: 61 MMHG | HEART RATE: 55 BPM | BODY MASS INDEX: 35 KG/M2 | WEIGHT: 250 LBS | RESPIRATION RATE: 24 BRPM

## 2024-03-07 DIAGNOSIS — I35.0 AORTIC VALVE STENOSIS, ETIOLOGY OF CARDIAC VALVE DISEASE UNSPECIFIED: Primary | ICD-10-CM

## 2024-03-07 DIAGNOSIS — I35.0 NODULAR CALCIFIC AORTIC VALVE STENOSIS: Primary | ICD-10-CM

## 2024-03-07 PROCEDURE — 99205 OFFICE O/P NEW HI 60 MIN: CPT | Performed by: INTERNAL MEDICINE

## 2024-03-07 PROCEDURE — 3075F SYST BP GE 130 - 139MM HG: CPT | Performed by: INTERNAL MEDICINE

## 2024-03-07 PROCEDURE — 3078F DIAST BP <80 MM HG: CPT | Performed by: INTERNAL MEDICINE

## 2024-03-07 PROCEDURE — 1123F ACP DISCUSS/DSCN MKR DOCD: CPT | Performed by: INTERNAL MEDICINE

## 2024-03-07 ASSESSMENT — ENCOUNTER SYMPTOMS
BLOOD IN STOOL: 0
SHORTNESS OF BREATH: 1
NAUSEA: 0
BACK PAIN: 0
COUGH: 0
VOMITING: 0
ABDOMINAL PAIN: 0
CHEST TIGHTNESS: 0

## 2024-03-07 NOTE — PROGRESS NOTES
Humiliation, Afraid, Rape, and Kick questionnaire     Fear of Current or Ex-Partner: No     Emotionally Abused: No     Physically Abused: No     Sexually Abused: No   Housing Stability: Not on file       Family History:  Family History   Problem Relation Age of Onset    Heart Disease Mother     Heart Disease Father        Objective:  There were no vitals filed for this visit.  General Appearance: Pleasant 78 y.o. year old male who appears stated age.  Communicates well, no acute distress.    HEENT: Head is normocephalic, atraumatic.  EOMs intact, PERRL.  Trachea midline.   Lungs: Normal respiratory rate and normal effort. He is not in respiratory distress. Breath sounds clear to auscultation. No wheezes.   Heart: Normal rate. Regular rhythm. S1 normal and S2 normal. Positive for murmur.   Chest: Symmetric chest wall expansion.   Extremities: Normal range of motion.     Neurological: Patient is alert and oriented to person, place and time. Patient has normal reflexes.   Skin: Warm and dry.   Abdomen: Abdomen is soft and non-distended. Bowel sounds are normal. There is no abdominal tenderness tenderness. There is no guarding. There is no mass.   Pulses: Distal pulses are intact.  Skin: Warm and dry without lesions.    Cardiac testing:    TTE 1/17/24:    Left Ventricle: Normal left ventricular systolic function with a visually estimated EF of 60 - 65%. Left ventricle size is normal. Normal wall thickness. Normal wall motion. Grade II diastolic dysfunction with increased LAP.    Right Ventricle: Right ventricle is mildly dilated. Normal systolic function.    Aortic Valve: Mild to moderate regurgitation. Possible paradoxical low flow/low gradient severe aortic stenosis with normal EF. AV mean gradient is 22 mmHg. AV peak velocity is 3.1 m/s. LVOT:AV VTI Index is 0.25. AV area by continuity VTI is 1.0 cm2. AV Stroke Volume index is 31.6 mL/m2.    Left Atrium: Left atrium is mildly dilated.    Right Atrium: Right atrium is

## 2024-03-07 NOTE — PROGRESS NOTES
OFFICE VISIT    NAME: Mason Gudino     YOB: 1945   AGE: 78 y.o.   MEDICAL RECORD NUMBER: [id number]       CONSULTATION INFORMATION:   DATE OF CLINIC CONSULTATION: 3/7/2024   PRINCIPLE DIAGNOSIS / reason for visit: aortic stenosis    CARE TEAM MEMBERS    PCP : Abhinav Elkins MD     PRIMARY CARDIOLOGIST: Dr. Amin   REFERRING PROVIDER: Phil Amin MD     HISTORY OF PRESENT ILLNESS:   Mason Gudino is a 78 y.o. male referred by Dr. Amin for aortic stenosis. Past medical history of CAD, HTN, HLD, HFpEF, PAF (on amiodarone and apixaban), rheumatic fever, FLORIAN on CPAP, asbestosis, CKD    He was seen by Dr. Amin last month for preop clearance prior to knee surgery. He described vague, daily chest discomfort and ACOSTA. Recent echo showed paradoxical LFLG severe AS, prompting recommendation for coronary angiography and valve clinic referral.        -TTE 1/17/23: Personally reviewed, PRINCE 1.0, MG 22, Vmax 3.1, DI 0.25, mild-mod AI  -ECG 1/23/24: Personally reviewed, NSR, LAFB, QRS 98  -LHC 2/6/24: mild CAD, Peak to peak gradient 38 mmHg, MG 24 mmHg, LVEDP 5 mmHg      Presents to clinic today with his son.  He describes symptoms of dyspnea on exertion along with chest discomfort over the last 1 and half years, worsening lately.  His son reports that just walking around the house causes him to be huffing and puffing.  Patient is still able to perform her daily activities and walk around and lives by himself.  However he feels tired more frequently and relates that to his old age.  He does have lower extremity edema.  He has left knee pain that requires knee replacement once he is cleared from cardiac standpoint.  He had A-fib recently and was cardioverted.  His symptom improved a little bit with conversion to normal sinus rhythm, but continues to have CP and ACOSTA.        Past Medical History:   Diagnosis Date    Aortic stenosis     Asbestosis(501) 06/2008    CAD (coronary artery disease)

## 2024-03-07 NOTE — PATIENT INSTRUCTIONS
We will be in touch to schedule your pulmonary function test and CT scans    Further recommendations pending above results  
Abdominal Pain, N/V/D

## 2024-03-14 ENCOUNTER — TELEPHONE (OUTPATIENT)
Dept: CARDIOLOGY CLINIC | Age: 79
End: 2024-03-14

## 2024-03-14 DIAGNOSIS — I35.0 NODULAR CALCIFIC AORTIC VALVE STENOSIS: Primary | ICD-10-CM

## 2024-03-14 NOTE — TELEPHONE ENCOUNTER
Patient son Mason called and was asking if you were setting him up for PFT.     His phone number is [phone], I can absolutely let him know what's going on, just wanted his phone number in here in case you wanted to. Thank you!

## 2024-03-15 ENCOUNTER — TELEPHONE (OUTPATIENT)
Dept: CARDIOLOGY | Age: 79
End: 2024-03-15

## 2024-03-15 NOTE — TELEPHONE ENCOUNTER
Patient's son (Mason) notified of PFT scheduled 3/202/24 at 8:30 am at [facility]. Instructed to hold pulmonary meds x 4 hours prior and no caffeine on day of testing. Advised to bring photo ID and insurance card. Will be in touch with further recommendations/testing pending results. He states understanding.

## 2024-03-20 ENCOUNTER — HOSPITAL ENCOUNTER (OUTPATIENT)
Dept: PULMONOLOGY | Age: 79
Discharge: HOME | End: 2024-03-20
Attending: INTERNAL MEDICINE
Payer: MEDICARE

## 2024-03-20 DIAGNOSIS — I35.0 NODULAR CALCIFIC AORTIC VALVE STENOSIS: ICD-10-CM

## 2024-03-20 PROCEDURE — 94375 RESPIRATORY FLOW VOLUME LOOP: CPT

## 2024-03-20 PROCEDURE — 94729 DIFFUSING CAPACITY: CPT

## 2024-03-29 DIAGNOSIS — N18.31 STAGE 3A CHRONIC KIDNEY DISEASE (HCC): ICD-10-CM

## 2024-03-29 DIAGNOSIS — I35.0 NODULAR CALCIFIC AORTIC VALVE STENOSIS: Primary | ICD-10-CM

## 2024-04-03 ENCOUNTER — HOSPITAL ENCOUNTER (OUTPATIENT)
Age: 79
Discharge: HOME | End: 2024-04-03
Attending: INTERNAL MEDICINE
Payer: MEDICARE

## 2024-04-03 ENCOUNTER — HOSPITAL ENCOUNTER (OUTPATIENT)
Dept: CT IMAGING | Age: 79
Discharge: HOME | End: 2024-04-05
Attending: INTERNAL MEDICINE
Payer: MEDICARE

## 2024-04-03 DIAGNOSIS — I35.0 NODULAR CALCIFIC AORTIC VALVE STENOSIS: ICD-10-CM

## 2024-04-03 LAB
ANION GAP SERPL CALCULATED.3IONS-SCNC: 10 MMOL/L (ref 7–16)
BUN SERPL-MCNC: 25 MG/DL (ref 6–23)
CALCIUM SERPL-MCNC: 9.6 MG/DL (ref 8.6–10.2)
CHLORIDE SERPL-SCNC: 101 MMOL/L (ref 98–107)
CO2 SERPL-SCNC: 28 MMOL/L (ref 22–29)
CREAT SERPL-MCNC: 1.6 MG/DL (ref 0.7–1.2)
GFR SERPL CREATININE-BSD FRML MDRD: 42 ML/MIN/1.73M2
GLUCOSE SERPL-MCNC: 144 MG/DL (ref 74–99)
POTASSIUM SERPL-SCNC: 4 MMOL/L (ref 3.5–5)
SODIUM SERPL-SCNC: 139 MMOL/L (ref 132–146)

## 2024-04-03 PROCEDURE — 80048 BASIC METABOLIC PNL TOTAL CA: CPT

## 2024-04-03 PROCEDURE — 74174 CTA ABD&PLVS W/CONTRAST: CPT

## 2024-04-03 PROCEDURE — 71275 CT ANGIOGRAPHY CHEST: CPT

## 2024-04-03 PROCEDURE — 75572 CT HRT W/3D IMAGE: CPT

## 2024-04-03 PROCEDURE — 6360000004 HC RX CONTRAST MEDICATION: Performed by: RADIOLOGY

## 2024-04-03 RX ADMIN — IOPAMIDOL 105 ML: 755 INJECTION, SOLUTION INTRAVENOUS at 17:32

## 2024-04-05 ENCOUNTER — HOSPITAL ENCOUNTER (OUTPATIENT)
Dept: ULTRASOUND IMAGING | Age: 79
End: 2024-04-05
Attending: INTERNAL MEDICINE
Payer: MEDICARE

## 2024-04-05 ENCOUNTER — TELEPHONE (OUTPATIENT)
Dept: CARDIOLOGY CLINIC | Age: 79
End: 2024-04-05

## 2024-04-05 DIAGNOSIS — M25.431 SWELLING OF RIGHT WRIST: ICD-10-CM

## 2024-04-05 DIAGNOSIS — M25.431 SWELLING OF RIGHT WRIST: Primary | ICD-10-CM

## 2024-04-05 PROCEDURE — 93931 UPPER EXTREMITY STUDY: CPT

## 2024-04-05 NOTE — PROGRESS NOTES
Patient called with loculated swelling (~2-3 cm) over right radial artery access site that has been stable, slightly painful, no erythema, no extension into forearm.    Ordered ultrasound for further assessment.

## 2024-04-05 NOTE — TELEPHONE ENCOUNTER
Hematoma at the site of the cath - has gotten progressively bigger and its painful.  Has a hard time moving wrist.  Tried to call [place] office and no one answers per KATERYNA Perez (preservice).    Gianna  [phone]

## 2024-04-11 ENCOUNTER — OFFICE VISIT (OUTPATIENT)
Dept: CARDIOLOGY CLINIC | Age: 79
End: 2024-04-11
Payer: MEDICARE

## 2024-04-11 VITALS
WEIGHT: 261.5 LBS | SYSTOLIC BLOOD PRESSURE: 132 MMHG | BODY MASS INDEX: 36.61 KG/M2 | HEART RATE: 51 BPM | DIASTOLIC BLOOD PRESSURE: 60 MMHG | RESPIRATION RATE: 18 BRPM | HEIGHT: 71 IN

## 2024-04-11 DIAGNOSIS — I48.0 PAROXYSMAL ATRIAL FIBRILLATION (HCC): Primary | ICD-10-CM

## 2024-04-11 DIAGNOSIS — I25.10 CORONARY ARTERY DISEASE INVOLVING NATIVE CORONARY ARTERY OF NATIVE HEART, UNSPECIFIED WHETHER ANGINA PRESENT: ICD-10-CM

## 2024-04-11 PROCEDURE — 93000 ELECTROCARDIOGRAM COMPLETE: CPT | Performed by: INTERNAL MEDICINE

## 2024-04-11 PROCEDURE — 1123F ACP DISCUSS/DSCN MKR DOCD: CPT | Performed by: INTERNAL MEDICINE

## 2024-04-11 PROCEDURE — 3078F DIAST BP <80 MM HG: CPT | Performed by: INTERNAL MEDICINE

## 2024-04-11 PROCEDURE — 99214 OFFICE O/P EST MOD 30 MIN: CPT | Performed by: INTERNAL MEDICINE

## 2024-04-11 PROCEDURE — 3075F SYST BP GE 130 - 139MM HG: CPT | Performed by: INTERNAL MEDICINE

## 2024-04-11 RX ORDER — METOPROLOL SUCCINATE 25 MG/1
25 TABLET, EXTENDED RELEASE ORAL DAILY
Qty: 25 TABLET | Refills: 3
Start: 2024-04-11

## 2024-04-11 NOTE — PROGRESS NOTES
abnormalities seen.   Normal left ventricular wall thickness.   Normal right ventricular size and function.   Biatrial dilation.   Mild mitral regurgitation.   Aortic regurgitation, not well seen.   Mild-moderate aortic stenosis is present.    Stress test:    Pharmacologic stress 10/5/2022  Gated SPECT left ventricular ejection fraction was calculated to be 54%, with normal myocardial thickening and wall motion.     Impression:    ECG during the infusion did not change.   The myocardial perfusion imaging was normal with attenuation artifact.       Overall left ventricular systolic function was normal without regional wall motion abnormalities.  Low risk general pharmacologic stress test.    Cardiac catheterization:   [facility] 2/6/24  Conclusions:  Mild multivessel atherosclerotic coronary artery disease, in a right dominant system  Mild (30%) proximal LAD tubular lesion  Mild 20% diffuse proximal RCA stenosis  LVEDP 5 mmHg  Aortic stenosis with peak-to-peak gradient of 38 mmHg on LV-Ao pullback, mean gradient of 23 mmHg.    Orders Placed This Encounter   Procedures    EKG 12 lead        Requested Prescriptions     Signed Prescriptions Disp Refills    metoprolol succinate (TOPROL XL) 25 MG extended release tablet 25 tablet 3     Sig: Take 1 tablet by mouth daily        ASSESSMENT / PLAN:  Chest pain  Chronic HFpEF.  Euvolemic  Persistent atrial fibrillation, diagnosed 8/2022.  Maintaining sinus, apixaban  RAN/DCC 11/2022  Recurrence noted 8/2023, repeat DCC 9/2023 + amiodarone initiation  Mild nonobstructive coronary artery disease by cath 2/2024; remote angioplasty 1992.  Nonischemic stress 10/2022  Moderate-severe (paradoxical low-flow low gradient) aortic stenosis, mild-moderate aortic regurgitation, mild MR  Radial artery hematoma post catheterization  Hypertension, well controlled  Sinus bradycardia  CKD creatinine 1.3-1.6  Hyperlipidemia  Type 2 diabetes A1c 7.2%  FLORIAN on CPAP follows with pulmonary  Pulmonary

## 2024-05-15 PROBLEM — J44.9 STAGE 1 MILD CHRONIC OBSTRUCTIVE PULMONARY DISEASE BY GLOBAL INITIATIVE FOR CHRONIC OBSTRUCTIVE LUNG DISEASE CLASSIFICATION (HCC): Status: ACTIVE | Noted: 2024-05-15

## 2024-05-15 PROBLEM — G47.33 OSA ON CPAP: Status: ACTIVE | Noted: 2024-05-15

## 2024-05-15 PROBLEM — Z98.890 S/P HEART VALVE REPAIR: Status: ACTIVE | Noted: 2024-05-15

## 2024-06-19 RX ORDER — DONEPEZIL HYDROCHLORIDE 10 MG/1
10 TABLET, FILM COATED ORAL NIGHTLY
Qty: 90 TABLET | Refills: 3 | Status: SHIPPED | OUTPATIENT
Start: 2024-06-19

## 2024-07-02 ENCOUNTER — OFFICE VISIT (OUTPATIENT)
Dept: ORTHOPEDIC SURGERY | Age: 79
End: 2024-07-02
Payer: MEDICARE

## 2024-07-02 VITALS — WEIGHT: 250 LBS | BODY MASS INDEX: 35 KG/M2 | HEIGHT: 71 IN

## 2024-07-02 DIAGNOSIS — M17.12 PRIMARY OSTEOARTHRITIS OF LEFT KNEE: Primary | ICD-10-CM

## 2024-07-02 PROCEDURE — 1123F ACP DISCUSS/DSCN MKR DOCD: CPT | Performed by: NURSE PRACTITIONER

## 2024-07-02 PROCEDURE — 20610 DRAIN/INJ JOINT/BURSA W/O US: CPT | Performed by: NURSE PRACTITIONER

## 2024-07-02 PROCEDURE — 99213 OFFICE O/P EST LOW 20 MIN: CPT | Performed by: NURSE PRACTITIONER

## 2024-07-02 RX ORDER — TRIAMCINOLONE ACETONIDE 40 MG/ML
40 INJECTION, SUSPENSION INTRA-ARTICULAR; INTRAMUSCULAR ONCE
Status: COMPLETED | OUTPATIENT
Start: 2024-07-02 | End: 2024-07-02

## 2024-07-02 RX ORDER — BUPIVACAINE HYDROCHLORIDE 2.5 MG/ML
2 INJECTION, SOLUTION INFILTRATION; PERINEURAL ONCE
Status: COMPLETED | OUTPATIENT
Start: 2024-07-02 | End: 2024-07-02

## 2024-07-02 RX ADMIN — TRIAMCINOLONE ACETONIDE 40 MG: 40 INJECTION, SUSPENSION INTRA-ARTICULAR; INTRAMUSCULAR at 16:06

## 2024-07-02 RX ADMIN — BUPIVACAINE HYDROCHLORIDE 5 MG: 2.5 INJECTION, SOLUTION INFILTRATION; PERINEURAL at 16:05

## 2024-07-02 NOTE — PROGRESS NOTES
[facility]   ORTHOPAEDIC SURGERY AND SPORTS MEDICINE  DATE OF VISIT: 07/02/24  Follow Up Knee Visit     CHIEF COMPLAINT:   Chief Complaint   Patient presents with    Injections     Patient is here for CSI of the left knee. Patient states he is consistently in pain when walking.        HPI:    Mason Gudino is a 79 y.o. year old male who presented to the office today for follow up of left knee pain, previously evaluated on 2/20/2024. Previous treatment has included completion of viscosupplement injections. He reports symptoms improved, but reports pain has returned. The patient has responded to the treatment.      REVIEW OF SYSTEMS:     General: Negative Fever, chills, fatigue   Cardiovascular: Negative chest pain, palpitations  Respiratory: Equal chest expansion, negative shortness of breath   Skin: Negative rash, open wounds  Psych: Normal affect, mood stable  Neurologic: sensation grossly intact in extremities   Musculoskeletal: See HPI      Physical Exam:     Height: 1.803 m (5' 11\"), Weight - Scale: 113.4 kg (250 lb)    General: Alert and oriented x3, no acute distress  Cardiovascular/pulmonary: No labored breathing, peripheral perfusion intact  Musculoskeletal:    Left Knee:    negative swelling/deformity/effusion  Range of motion is 0 to 125.     Patella is stable tracking midline, positive patellofemoral crepitus  There is no laxity with varus/valgus stress at 0 and 30 degrees of flexion.    There is tenderness to palpation along the medial joint line.    There is no tenderness to palpation along the lateral joint line        Controlled Substances Monitoring:      Imaging:  Previous imaging reviewed    Procedure Note: Knee Cortisone injection     The left knee was identified as the injection site. The risk and benefits of a cortisone injection were explained and the patient consented to the injection. Under sterile conditions, the knee was injected with a mixture of 40 mg of Kenalog and local anesthetic

## 2024-07-11 ENCOUNTER — OFFICE VISIT (OUTPATIENT)
Dept: CARDIOLOGY CLINIC | Age: 79
End: 2024-07-11
Payer: MEDICARE

## 2024-07-11 VITALS
HEART RATE: 55 BPM | WEIGHT: 256 LBS | HEIGHT: 71 IN | DIASTOLIC BLOOD PRESSURE: 70 MMHG | BODY MASS INDEX: 35.84 KG/M2 | RESPIRATION RATE: 20 BRPM | SYSTOLIC BLOOD PRESSURE: 140 MMHG

## 2024-07-11 DIAGNOSIS — Z79.01 ON APIXABAN THERAPY: ICD-10-CM

## 2024-07-11 DIAGNOSIS — I25.10 CORONARY ARTERY DISEASE INVOLVING NATIVE CORONARY ARTERY OF NATIVE HEART WITHOUT ANGINA PECTORIS: ICD-10-CM

## 2024-07-11 DIAGNOSIS — I35.0 NONRHEUMATIC AORTIC VALVE STENOSIS: ICD-10-CM

## 2024-07-11 DIAGNOSIS — Z79.899 LONG TERM CURRENT USE OF AMIODARONE: ICD-10-CM

## 2024-07-11 DIAGNOSIS — N18.9 CHRONIC KIDNEY DISEASE, UNSPECIFIED CKD STAGE: ICD-10-CM

## 2024-07-11 DIAGNOSIS — I48.0 PAROXYSMAL ATRIAL FIBRILLATION (HCC): Primary | ICD-10-CM

## 2024-07-11 PROCEDURE — 3077F SYST BP >= 140 MM HG: CPT | Performed by: INTERNAL MEDICINE

## 2024-07-11 PROCEDURE — 99214 OFFICE O/P EST MOD 30 MIN: CPT | Performed by: INTERNAL MEDICINE

## 2024-07-11 PROCEDURE — 3078F DIAST BP <80 MM HG: CPT | Performed by: INTERNAL MEDICINE

## 2024-07-11 PROCEDURE — 93000 ELECTROCARDIOGRAM COMPLETE: CPT | Performed by: INTERNAL MEDICINE

## 2024-07-11 PROCEDURE — 1123F ACP DISCUSS/DSCN MKR DOCD: CPT | Performed by: INTERNAL MEDICINE
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
## 2024-07-25 DIAGNOSIS — I48.0 PAROXYSMAL ATRIAL FIBRILLATION (HCC): ICD-10-CM

## 2024-07-26 PROBLEM — H26.9 RIGHT CATARACT: Status: RESOLVED | Noted: 2019-04-30 | Resolved: 2024-07-26

## 2024-07-26 PROBLEM — H26.9 LEFT CATARACT: Status: RESOLVED | Noted: 2019-10-08 | Resolved: 2024-07-26

## 2024-07-26 PROBLEM — R55 NEAR SYNCOPE: Status: RESOLVED | Noted: 2017-10-05 | Resolved: 2024-07-26

## 2024-07-26 PROBLEM — I48.0 PAROXYSMAL ATRIAL FIBRILLATION (HCC): Status: RESOLVED | Noted: 2022-10-26 | Resolved: 2024-07-26

## 2024-08-13 ENCOUNTER — TELEPHONE (OUTPATIENT)
Dept: ORTHOPEDIC SURGERY | Age: 79
End: 2024-08-13

## 2024-08-20 ENCOUNTER — OFFICE VISIT (OUTPATIENT)
Dept: ORTHOPEDIC SURGERY | Age: 79
End: 2024-08-20
Payer: MEDICARE

## 2024-08-20 VITALS — WEIGHT: 256 LBS | HEIGHT: 71 IN | BODY MASS INDEX: 35.84 KG/M2

## 2024-08-20 DIAGNOSIS — M17.12 PRIMARY OSTEOARTHRITIS OF LEFT KNEE: Primary | ICD-10-CM

## 2024-08-20 PROCEDURE — 20610 DRAIN/INJ JOINT/BURSA W/O US: CPT | Performed by: NURSE PRACTITIONER

## 2024-08-20 NOTE — PROGRESS NOTES
[facility]  ORTHOPAEDICS AND SPORTS MEDICINE  DATE OF VISIT: 08/20/24  Follow Up Visit for Visco Injection    CHIEF COMPLAINT:   Chief Complaint   Patient presents with    Knee Pain     Pt is here today for a left knee Gelsyn injections # 1 of 3.          Mason Gudino returns for follow up visit for visco supplement injection 1.  He reports symptoms are unchanged    Physical Exam:   General: Alert and oriented x3, no acute distress  Cardiovascular/pulmonary: No labored breathing, peripheral perfusion intact  Musculoskeletal:    Left knee:    Range of motion is functional   Patella is stable tracking midline  There is no laxity with varus/valgus stress at 0 and 30 degrees of flexion.    There is no tenderness to palpation along the medial joint line.    There is no tenderness to palpation along the lateral joint line       Imaging: Reviewed     Procedure Note: Knee viscosupplementation injection     The Left knee was identified as the injection site. The risk and benefits of injection were explained and the patient consented to the injection. Under sterile conditions, the knee was injected with a full dose of Gelsyn-3. A sterile bandage was applied.    Administrations This Visit       sodium hyaluronate (Viscosup) injection SOSY 16.8 mg       Admin Date  08/20/2024 Action  Given Dose  16.8 mg Route  Intra-artICUlar Documented By  Shanti Gross RN                      Assessment/Plan: S/p Left knee Gelsyn-3 injection #1 for osteoarthritis  -Continue activity modification/NSAIDS/ICE prn  -f/u next week for second injection      WALI Corbin-CNP  Orthopedic Surgery   08/20/24  4:09 PM

## 2024-08-27 ENCOUNTER — NURSE ONLY (OUTPATIENT)
Dept: ORTHOPEDIC SURGERY | Age: 79
End: 2024-08-27
Payer: MEDICARE

## 2024-08-27 VITALS — WEIGHT: 256 LBS | BODY MASS INDEX: 35.84 KG/M2 | HEIGHT: 71 IN

## 2024-08-27 DIAGNOSIS — M17.12 PRIMARY OSTEOARTHRITIS OF LEFT KNEE: Primary | ICD-10-CM

## 2024-08-27 PROCEDURE — 20610 DRAIN/INJ JOINT/BURSA W/O US: CPT | Performed by: NURSE PRACTITIONER

## 2024-08-27 NOTE — PROGRESS NOTES
[facility]  ORTHOPAEDICS AND SPORTS MEDICINE  DATE OF VISIT: 08/27/24  Follow Up Visit for Visco Injection    CHIEF COMPLAINT:   Chief Complaint   Patient presents with    Injections     Pt is here today for his left Gelsyn~3 injections # 2 of 3.          Mason Gudino returns for follow up visit for visco supplement injection 2.  He reports symptoms are mildly improved.    Physical Exam:   General: Alert and oriented x3, no acute distress  Cardiovascular/pulmonary: No labored breathing, peripheral perfusion intact  Musculoskeletal:    Left knee:    Range of motion is functional   Patella is stable tracking midline  There is no laxity with varus/valgus stress at 0 and 30 degrees of flexion.    There is no tenderness to palpation along the medial joint line.    There is no tenderness to palpation along the lateral joint line       Imaging: Reviewed     Procedure Note: Knee viscosupplementation injection     The Left knee was identified as the injection site. The risk and benefits of injection were explained and the patient consented to the injection. Under sterile conditions, the knee was injected with a full dose of Gelsyn-3. A sterile bandage was applied.    Administrations This Visit       sodium hyaluronate (Viscosup) injection SOSY 16.8 mg       Admin Date  08/27/2024 Action  Given Dose  16.8 mg Route  Intra-artICUlar Documented By  Shanti Gross RN                      Assessment/Plan: S/p Left knee Gelsyn-3 injection #2 for osteoarthritis  -Continue activity modification/NSAIDS/ICE prn  -f/u next week for final injection      WALI Corbin-CNP  Orthopedic Surgery   08/27/24  4:00 PM

## 2024-09-03 ENCOUNTER — NURSE ONLY (OUTPATIENT)
Dept: ORTHOPEDIC SURGERY | Age: 79
End: 2024-09-03
Payer: MEDICARE

## 2024-09-03 VITALS — HEIGHT: 71 IN | WEIGHT: 256 LBS | BODY MASS INDEX: 35.84 KG/M2

## 2024-09-03 DIAGNOSIS — M17.12 PRIMARY OSTEOARTHRITIS OF LEFT KNEE: Primary | ICD-10-CM

## 2024-09-03 PROCEDURE — 20610 DRAIN/INJ JOINT/BURSA W/O US: CPT | Performed by: NURSE PRACTITIONER

## 2024-09-03 NOTE — PROGRESS NOTES
[facility]  ORTHOPAEDICS AND SPORTS MEDICINE  DATE OF VISIT: 09/03/24  Follow Up Visit for Visco Injection    CHIEF COMPLAINT:   Chief Complaint   Patient presents with    Injections     Pt is here today for a left knee Gelsyn~3 injections # 3 of 3.          Mason Gudino returns for follow up visit for visco supplement injection 3.  He reports symptoms are unchanged    Physical Exam:   General: Alert and oriented x3, no acute distress  Cardiovascular/pulmonary: No labored breathing, peripheral perfusion intact  Musculoskeletal:    Left knee:    Range of motion is functional   Patella is stable tracking midline  There is no laxity with varus/valgus stress at 0 and 30 degrees of flexion.    There is no tenderness to palpation along the medial joint line.    There is no tenderness to palpation along the lateral joint line       Imaging: Reviewed     Procedure Note: Knee viscosupplementation injection     The Left knee was identified as the injection site. The risk and benefits of injection were explained and the patient consented to the injection. Under sterile conditions, the knee was injected with a full dose of Gelsyn-3. A sterile bandage was applied.    Administrations This Visit       sodium hyaluronate (Viscosup) injection SOSY 16.8 mg       Admin Date  09/03/2024 Action  Given Dose  16.8 mg Route  Intra-artICUlar Documented By  Clayton Ervin APRN - CNP                      Assessment/Plan: S/p Left knee Gelsyn-3 injection #3 for osteoarthritis  -Continue activity modification/NSAIDS/ICE prn  -f/u 3 months or prn       LAURIE Corbin  Orthopedic Surgery   09/03/24  4:06 PM

## 2024-09-10 ENCOUNTER — OFFICE VISIT (OUTPATIENT)
Dept: FAMILY MEDICINE CLINIC | Age: 79
End: 2024-09-10
Payer: MEDICARE

## 2024-09-10 ENCOUNTER — TELEPHONE (OUTPATIENT)
Dept: ORTHOPEDIC SURGERY | Age: 79
End: 2024-09-10

## 2024-09-10 VITALS
DIASTOLIC BLOOD PRESSURE: 68 MMHG | RESPIRATION RATE: 16 BRPM | HEART RATE: 62 BPM | BODY MASS INDEX: 35.84 KG/M2 | WEIGHT: 256 LBS | SYSTOLIC BLOOD PRESSURE: 138 MMHG | TEMPERATURE: 98 F | OXYGEN SATURATION: 92 % | HEIGHT: 71 IN

## 2024-09-10 DIAGNOSIS — M17.12 PRIMARY OSTEOARTHRITIS OF LEFT KNEE: Primary | ICD-10-CM

## 2024-09-10 DIAGNOSIS — Z01.818 PRE-OPERATIVE EXAM: ICD-10-CM

## 2024-09-10 DIAGNOSIS — L72.3 SEBACEOUS CYST: Primary | ICD-10-CM

## 2024-09-10 PROCEDURE — 99213 OFFICE O/P EST LOW 20 MIN: CPT

## 2024-09-10 PROCEDURE — 1123F ACP DISCUSS/DSCN MKR DOCD: CPT

## 2024-09-10 PROCEDURE — 3078F DIAST BP <80 MM HG: CPT

## 2024-09-10 PROCEDURE — 3075F SYST BP GE 130 - 139MM HG: CPT

## 2024-09-10 RX ORDER — ACETAMINOPHEN 500 MG
1000 TABLET ORAL EVERY 8 HOURS PRN
COMMUNITY

## 2024-09-10 RX ORDER — AMOXICILLIN 500 MG/1
500 CAPSULE ORAL
COMMUNITY
Start: 2024-08-29

## 2024-09-10 SDOH — ECONOMIC STABILITY: INCOME INSECURITY: HOW HARD IS IT FOR YOU TO PAY FOR THE VERY BASICS LIKE FOOD, HOUSING, MEDICAL CARE, AND HEATING?: SOMEWHAT HARD

## 2024-09-10 SDOH — ECONOMIC STABILITY: FOOD INSECURITY: WITHIN THE PAST 12 MONTHS, THE FOOD YOU BOUGHT JUST DIDN'T LAST AND YOU DIDN'T HAVE MONEY TO GET MORE.: NEVER TRUE

## 2024-09-10 SDOH — ECONOMIC STABILITY: FOOD INSECURITY: WITHIN THE PAST 12 MONTHS, YOU WORRIED THAT YOUR FOOD WOULD RUN OUT BEFORE YOU GOT MONEY TO BUY MORE.: NEVER TRUE

## 2024-09-10 NOTE — TELEPHONE ENCOUNTER
[facility]  ORTHOPAEDIC SURGERY SCHEDULING NOTE    Patient wishes to proceed after surgery discussion with Dr. Blackburn.     Surgical education was discussed and patient was given the surgical handout. Pre/post-op appointments were made as needed. Patient is also aware to obtain any medical clearances prior to surgery, if requested. Notified that pre-admission testing will also be reaching out for education and instructions on arrival time prior to procedure.     Patient is to obtain clearance(s) from: PCP , Cardiac      Surgery: Left Knee ADORE Robotic Assisted Total Arthroplasty   OR DATE: 10/29/2024  Vendor: [employer]  Block: FAISAL  CPT: 51816  DX: M17.12   Special Needs (if applicable): CT Scan

## 2024-09-11 DIAGNOSIS — E11.9 TYPE 2 DIABETES MELLITUS WITHOUT COMPLICATION, WITHOUT LONG-TERM CURRENT USE OF INSULIN (HCC): ICD-10-CM

## 2024-09-12 RX ORDER — LANCETS 28 GAUGE
EACH MISCELLANEOUS
Qty: 100 EACH | Refills: 3 | Status: SHIPPED | OUTPATIENT
Start: 2024-09-12

## 2024-09-25 NOTE — DISCHARGE INSTRUCTIONS
DISCHARGE INSTRUCTIONS FOR TOTAL KNEE REPLACEMENT        Prevent blood clots - you are at risk post operatively for DVT (Deep vein    thrombosis and / or PE (Pulmonary Embolism)       Continue antiembolic stockings (CAL hose) for 4 weeks from date of surgery.   Remove stockings every eight hours.   Check skin for redness or any breakdowns on heels and over outer ankle bones.   Do not roll stockings down or fold over (this may cause a band of constriction    interfering with circulation and increasing your risk of a blood clot forming or a    skin breakdown.   If you have not been wearing your stockings and notice increased swelling or    excessive swelling in your extremity then: Lie down and elevate legs for 20-30   minutes.  Apply stockings.  If swelling does not improve, call office.   REMEMBER: Mild to moderate swelling of the operative limb is expected    for some time after surgery.     Take frequent walks around  your home.   Be careful outdoors- watch for uneven ground and pavement, curbs and holes.   Gradually increase your activity to prevent fatigue.   When at rest (sitting in a chair or lying down,resting) continue circulation exercises at least every hour - foot flaps, ankle rolls, quad and glut sets.      You will continue one of the following blood thinners at home.   Lovenox -  An injection once or twice a day if you have a history of blood clots,    cancer, stomach ulcers or gastrointestinal bleeding.   Aspirin - 81 mg twice daily if no history of the above ailments.   Coumadin - if held before surgery, your family doctor will be responsible for   managing and ordering this medication upon your discharge.    Incision Care:   You may shower - Your dressing is water proof.  You can shower with your dressing on.  After one week, remove your dressing and leave your incision open to air.   REMEMBER to let water roll over incision. Incision line is    healing and tender - protect from HOT water. No need  for one week.      Lázaro Blackburn MD  Orthopaedic Surgery

## 2024-10-09 DIAGNOSIS — R06.2 WHEEZING: ICD-10-CM

## 2024-10-11 ENCOUNTER — HOSPITAL ENCOUNTER (OUTPATIENT)
Dept: CT IMAGING | Age: 79
Discharge: HOME | End: 2024-10-13
Payer: MEDICARE

## 2024-10-11 DIAGNOSIS — M17.12 PRIMARY OSTEOARTHRITIS OF LEFT KNEE: ICD-10-CM

## 2024-10-11 DIAGNOSIS — Z01.818 PRE-OPERATIVE EXAM: ICD-10-CM

## 2024-10-11 PROCEDURE — 73700 CT LOWER EXTREMITY W/O DYE: CPT

## 2024-10-11 RX ORDER — TIOTROPIUM BROMIDE 18 UG/1
CAPSULE ORAL; RESPIRATORY (INHALATION)
Qty: 90 CAPSULE | Refills: 3 | Status: SHIPPED | OUTPATIENT
Start: 2024-10-11

## 2024-10-11 RX ORDER — ALBUTEROL SULFATE 90 UG/1
AEROSOL, METERED RESPIRATORY (INHALATION)
Qty: 72 G | Refills: 3 | Status: SHIPPED | OUTPATIENT
Start: 2024-10-11

## 2024-10-11 NOTE — TELEPHONE ENCOUNTER
RESCHEDULED TODAY'S APPT TO 12/2/24!!!      Name of Medication(s) Requested:  Requested Prescriptions     Pending Prescriptions Disp Refills    tiotropium (SPIRIVA) 18 MCG inhalation capsule [Pharmacy Med Name: TIOTROPIUM BROMIDE INH CAP 18MCG] 90 capsule 3     Sig: INHALE THE CONTENTS OF 1 CAPSULE BY MOUTH VIA INHALATION DEVICE  DAILY    VENTOLIN  (90 Base) MCG/ACT inhaler [Pharmacy Med Name: Ventolin  (90 Base) MCG/ACT Inhalation Aerosol Solution] 72 g 3     Sig: USE 2 INHALATIONS BY MOUTH 4  TIMES DAILY AS NEEDED FOR  WHEEZING       Medication is on current medication list Yes    Dosage and directions were verified? Yes    Quantity verified: 90 day supply     Pharmacy Verified?  Yes    Last Appointment:  1/18/2024    Future appts:  Future Appointments   Date Time Provider Department Center   10/11/2024 10:30 AM [facility] CT SCAN 3 SEYZ CT [facility] Rad/Car   10/21/2024  8:10 AM Lázaro Blackburn MD [facility]   10/21/2024 10:30 AM [facility] 2 [facility]   11/8/2024  8:10 AM Lázaro Blackburn MD [facility]   11/14/2024  8:40 AM Nemesio Hernandez APRN - CNS [facility]   11/21/2024  7:45 AM Phil Amin MD [facility]   12/2/2024 11:00 AM Abhinav Elknis MD [facility]   12/10/2024  3:30 PM Clayton Ervin, APRN - CNP [facility]        (If no appt send self scheduling link. .REFILLAPPT)  Scheduling request sent?     [] Yes  [] No    Does patient need updated?  [] Yes  [] No

## 2024-10-13 ASSESSMENT — PROMIS GLOBAL HEALTH SCALE
HOW IS THE PROMIS V1.1 BEING ADMINISTERED?: ELECTRONIC
SUM OF RESPONSES TO QUESTIONS 3, 6, 7, & 8: 17
TO WHAT EXTENT ARE YOU ABLE TO CARRY OUT YOUR EVERYDAY PHYSICAL ACTIVITIES SUCH AS WALKING, CLIMBING STAIRS, CARRYING GROCERIES, OR MOVING A CHAIR [ON A SCALE OF 1 (NOT AT ALL) TO 5 (COMPLETELY)]?: MODERATELY
IN GENERAL, HOW WOULD YOU RATE YOUR MENTAL HEALTH, INCLUDING YOUR MOOD AND YOUR ABILITY TO THINK [ON A SCALE OF 1 (POOR) TO 5 (EXCELLENT)]?: VERY GOOD
IN GENERAL, PLEASE RATE HOW WELL YOU CARRY OUT YOUR USUAL SOCIAL ACTIVITIES (INCLUDES ACTIVITIES AT HOME, AT WORK, AND IN YOUR COMMUNITY, AND RESPONSIBILITIES AS A PARENT, CHILD, SPOUSE, EMPLOYEE, FRIEND, ETC) [ON A SCALE OF 1 (POOR) TO 5 (EXCELLENT)]?: FAIR
SUM OF RESPONSES TO QUESTIONS 2, 4, 5, & 10: 14
IN THE PAST 7 DAYS, HOW WOULD YOU RATE YOUR FATIGUE ON AVERAGE [ON A SCALE FROM 1 (NONE) TO 5 (VERY SEVERE)]?: MODERATE
IN GENERAL, HOW WOULD YOU RATE YOUR SATISFACTION WITH YOUR SOCIAL ACTIVITIES AND RELATIONSHIPS [ON A SCALE OF 1 (POOR) TO 5 (EXCELLENT)]?: VERY GOOD
IN GENERAL, WOULD YOU SAY YOUR HEALTH IS...[ON A SCALE OF 1 (POOR) TO 5 (EXCELLENT)]: GOOD
WHO IS THE PERSON COMPLETING THE PROMIS V1.1 SURVEY?: SURROGATE
IN GENERAL, HOW WOULD YOU RATE YOUR PHYSICAL HEALTH [ON A SCALE OF 1 (POOR) TO 5 (EXCELLENT)]?: GOOD
IN THE PAST 7 DAYS, HOW WOULD YOU RATE YOUR PAIN ON AVERAGE [ON A SCALE FROM 0 (NO PAIN) TO 10 (WORST IMAGINABLE PAIN)]?: 8
IN GENERAL, WOULD YOU SAY YOUR QUALITY OF LIFE IS...[ON A SCALE OF 1 (POOR) TO 5 (EXCELLENT)]: FAIR
IN THE PAST 7 DAYS, HOW OFTEN HAVE YOU BEEN BOTHERED BY EMOTIONAL PROBLEMS, SUCH AS FEELING ANXIOUS, DEPRESSED, OR IRRITABLE [ON A SCALE FROM 1 (NEVER) TO 5 (ALWAYS)]?: RARELY

## 2024-10-13 ASSESSMENT — KOOS JR
KOOS JR TOTAL INTERVAL SCORE: 0
STRAIGHTENING KNEE FULLY: EXTREME
HOW SEVERE IS YOUR KNEE STIFFNESS AFTER FIRST WAKING IN MORNING: EXTREME
BENDING TO THE FLOOR TO PICK UP OBJECT: EXTREME
RISING FROM SITTING: EXTREME
STANDING UPRIGHT: EXTREME
GOING UP OR DOWN STAIRS: EXTREME
TWISING OR PIVOTING ON KNEE: EXTREME

## 2024-10-21 ENCOUNTER — OFFICE VISIT (OUTPATIENT)
Dept: ORTHOPEDIC SURGERY | Age: 79
End: 2024-10-21

## 2024-10-21 ENCOUNTER — HOSPITAL ENCOUNTER (OUTPATIENT)
Dept: PREADMISSION TESTING | Age: 79
Discharge: HOME | End: 2024-10-21
Payer: MEDICARE

## 2024-10-21 ENCOUNTER — ANESTHESIA EVENT (OUTPATIENT)
Dept: OPERATING ROOM | Age: 79
End: 2024-10-21
Payer: MEDICARE

## 2024-10-21 VITALS — HEIGHT: 71 IN | BODY MASS INDEX: 35.84 KG/M2 | WEIGHT: 256 LBS

## 2024-10-21 VITALS
RESPIRATION RATE: 20 BRPM | TEMPERATURE: 97.8 F | BODY MASS INDEX: 35.84 KG/M2 | WEIGHT: 256 LBS | DIASTOLIC BLOOD PRESSURE: 73 MMHG | HEIGHT: 71 IN | SYSTOLIC BLOOD PRESSURE: 173 MMHG | HEART RATE: 68 BPM | OXYGEN SATURATION: 97 %

## 2024-10-21 DIAGNOSIS — M17.12 PRIMARY OSTEOARTHRITIS OF LEFT KNEE: ICD-10-CM

## 2024-10-21 DIAGNOSIS — Z01.818 PRE-OP EXAMINATION: Primary | ICD-10-CM

## 2024-10-21 DIAGNOSIS — Z01.818 PRE-OP TESTING: ICD-10-CM

## 2024-10-21 LAB
ANION GAP SERPL CALCULATED.3IONS-SCNC: 9 MMOL/L (ref 7–16)
BASOPHILS # BLD: 0.04 K/UL (ref 0–0.2)
BASOPHILS NFR BLD: 1 % (ref 0–2)
BUN SERPL-MCNC: 23 MG/DL (ref 6–23)
CALCIUM SERPL-MCNC: 9 MG/DL (ref 8.6–10.2)
CHLORIDE SERPL-SCNC: 106 MMOL/L (ref 98–107)
CO2 SERPL-SCNC: 28 MMOL/L (ref 22–29)
CREAT SERPL-MCNC: 1.5 MG/DL (ref 0.7–1.2)
EOSINOPHIL # BLD: 0.32 K/UL (ref 0.05–0.5)
EOSINOPHILS RELATIVE PERCENT: 5 % (ref 0–6)
ERYTHROCYTE [DISTWIDTH] IN BLOOD BY AUTOMATED COUNT: 13 % (ref 11.5–15)
GFR, ESTIMATED: 46 ML/MIN/1.73M2
GLUCOSE SERPL-MCNC: 174 MG/DL (ref 74–99)
HCT VFR BLD AUTO: 38.9 % (ref 37–54)
HGB BLD-MCNC: 12.8 G/DL (ref 12.5–16.5)
IMM GRANULOCYTES # BLD AUTO: <0.03 K/UL (ref 0–0.58)
IMM GRANULOCYTES NFR BLD: 0 % (ref 0–5)
LYMPHOCYTES NFR BLD: 1.79 K/UL (ref 1.5–4)
LYMPHOCYTES RELATIVE PERCENT: 25 % (ref 20–42)
MCH RBC QN AUTO: 32 PG (ref 26–35)
MCHC RBC AUTO-ENTMCNC: 32.9 G/DL (ref 32–34.5)
MCV RBC AUTO: 97.3 FL (ref 80–99.9)
MONOCYTES NFR BLD: 0.88 K/UL (ref 0.1–0.95)
MONOCYTES NFR BLD: 12 % (ref 2–12)
NEUTROPHILS NFR BLD: 57 % (ref 43–80)
NEUTS SEG NFR BLD: 4.1 K/UL (ref 1.8–7.3)
PLATELET # BLD AUTO: 212 K/UL (ref 130–450)
PMV BLD AUTO: 10.2 FL (ref 7–12)
POTASSIUM SERPL-SCNC: 4.2 MMOL/L (ref 3.5–5)
PREALB SERPL-MCNC: 23 MG/DL (ref 20–40)
RBC # BLD AUTO: 4 M/UL (ref 3.8–5.8)
SODIUM SERPL-SCNC: 143 MMOL/L (ref 132–146)
WBC OTHER # BLD: 7.2 K/UL (ref 4.5–11.5)

## 2024-10-21 PROCEDURE — 85025 COMPLETE CBC W/AUTO DIFF WBC: CPT

## 2024-10-21 PROCEDURE — 87081 CULTURE SCREEN ONLY: CPT

## 2024-10-21 PROCEDURE — 80048 BASIC METABOLIC PNL TOTAL CA: CPT

## 2024-10-21 PROCEDURE — 36415 COLL VENOUS BLD VENIPUNCTURE: CPT

## 2024-10-21 PROCEDURE — 84134 ASSAY OF PREALBUMIN: CPT

## 2024-10-21 RX ORDER — FENTANYL CITRATE 50 UG/ML
100 INJECTION, SOLUTION INTRAMUSCULAR; INTRAVENOUS ONCE
OUTPATIENT
Start: 2024-10-21 | End: 2024-10-21

## 2024-10-21 RX ORDER — MIDAZOLAM HYDROCHLORIDE 2 MG/2ML
1 INJECTION, SOLUTION INTRAMUSCULAR; INTRAVENOUS EVERY 5 MIN PRN
OUTPATIENT
Start: 2024-10-21

## 2024-10-21 RX ORDER — ROPIVACAINE HYDROCHLORIDE 5 MG/ML
30 INJECTION, SOLUTION EPIDURAL; INFILTRATION; PERINEURAL
OUTPATIENT
Start: 2024-10-21 | End: 2024-10-22

## 2024-10-21 ASSESSMENT — PROMIS GLOBAL HEALTH SCALE
IN GENERAL, HOW WOULD YOU RATE YOUR MENTAL HEALTH, INCLUDING YOUR MOOD AND YOUR ABILITY TO THINK [ON A SCALE OF 1 (POOR) TO 5 (EXCELLENT)]?: VERY GOOD
IN GENERAL, HOW WOULD YOU RATE YOUR PHYSICAL HEALTH [ON A SCALE OF 1 (POOR) TO 5 (EXCELLENT)]?: VERY GOOD
SUM OF RESPONSES TO QUESTIONS 3, 6, 7, & 8: 19
HOW IS THE PROMIS V1.1 BEING ADMINISTERED?: ELECTRONIC
IN THE PAST 7 DAYS, HOW WOULD YOU RATE YOUR FATIGUE ON AVERAGE [ON A SCALE FROM 1 (NONE) TO 5 (VERY SEVERE)]?: MILD
IN THE PAST 7 DAYS, HOW OFTEN HAVE YOU BEEN BOTHERED BY EMOTIONAL PROBLEMS, SUCH AS FEELING ANXIOUS, DEPRESSED, OR IRRITABLE [ON A SCALE FROM 1 (NEVER) TO 5 (ALWAYS)]?: NEVER
SUM OF RESPONSES TO QUESTIONS 2, 4, 5, & 10: 17
IN GENERAL, PLEASE RATE HOW WELL YOU CARRY OUT YOUR USUAL SOCIAL ACTIVITIES (INCLUDES ACTIVITIES AT HOME, AT WORK, AND IN YOUR COMMUNITY, AND RESPONSIBILITIES AS A PARENT, CHILD, SPOUSE, EMPLOYEE, FRIEND, ETC) [ON A SCALE OF 1 (POOR) TO 5 (EXCELLENT)]?: POOR
IN GENERAL, WOULD YOU SAY YOUR QUALITY OF LIFE IS...[ON A SCALE OF 1 (POOR) TO 5 (EXCELLENT)]: VERY GOOD
TO WHAT EXTENT ARE YOU ABLE TO CARRY OUT YOUR EVERYDAY PHYSICAL ACTIVITIES SUCH AS WALKING, CLIMBING STAIRS, CARRYING GROCERIES, OR MOVING A CHAIR [ON A SCALE OF 1 (NOT AT ALL) TO 5 (COMPLETELY)]?: MODERATELY
IN THE PAST 7 DAYS, HOW WOULD YOU RATE YOUR PAIN ON AVERAGE [ON A SCALE FROM 0 (NO PAIN) TO 10 (WORST IMAGINABLE PAIN)]?: 8
IN GENERAL, WOULD YOU SAY YOUR HEALTH IS...[ON A SCALE OF 1 (POOR) TO 5 (EXCELLENT)]: VERY GOOD
IN GENERAL, HOW WOULD YOU RATE YOUR SATISFACTION WITH YOUR SOCIAL ACTIVITIES AND RELATIONSHIPS [ON A SCALE OF 1 (POOR) TO 5 (EXCELLENT)]?: VERY GOOD

## 2024-10-21 ASSESSMENT — KOOS JR
GOING UP OR DOWN STAIRS: EXTREME
STANDING UPRIGHT: EXTREME
BENDING TO THE FLOOR TO PICK UP OBJECT: EXTREME
STRAIGHTENING KNEE FULLY: EXTREME
TWISING OR PIVOTING ON KNEE: EXTREME
RISING FROM SITTING: EXTREME
HOW SEVERE IS YOUR KNEE STIFFNESS AFTER FIRST WAKING IN MORNING: SEVERE
KOOS JR TOTAL INTERVAL SCORE: 8.291

## 2024-10-21 ASSESSMENT — PAIN - FUNCTIONAL ASSESSMENT: PAIN_FUNCTIONAL_ASSESSMENT: PREVENTS OR INTERFERES WITH MANY ACTIVE NOT PASSIVE ACTIVITIES

## 2024-10-21 ASSESSMENT — PAIN DESCRIPTION - FREQUENCY: FREQUENCY: CONTINUOUS

## 2024-10-21 ASSESSMENT — PAIN DESCRIPTION - ORIENTATION: ORIENTATION: LEFT

## 2024-10-21 ASSESSMENT — PAIN SCALES - GENERAL: PAINLEVEL_OUTOF10: 8

## 2024-10-21 ASSESSMENT — PAIN DESCRIPTION - PAIN TYPE: TYPE: CHRONIC PAIN

## 2024-10-21 ASSESSMENT — PAIN DESCRIPTION - LOCATION: LOCATION: KNEE

## 2024-10-21 NOTE — PROGRESS NOTES
Left voice message for Zaki at Dr Blackburn's office - abnormal values on CBC, BMP reports dated 10/21/2024 are ready for review - requested Zaki follow up with Dr Blackburn of [facility].

## 2024-10-21 NOTE — DISCHARGE INSTRUCTIONS
Charisse Mcgee, RN  Registered Nurse     Progress Notes      Signed     Date of Service: 10/21/2024 10:30 AM     Signed         [facility] PRE-ADMISSION TESTING INSTRUCTIONS     The Preadmission Testing patient is instructed accordingly using the following criteria (check applicable):     ARRIVAL INSTRUCTIONS:  [x] Parking the day of Surgery is located in the Main Entrance lot.  Upon entering the door, make an immediate right to the surgery reception desk     [x] Bring photo ID and insurance card     [x] Bring in a copy of Living will or Durable Power of  papers.     [x] Please be sure to arrange for responsible adult to provide transportation to and from the hospital     [x] Please arrange for responsible adult to be with you for the 24 hour period post procedure due to having anesthesia     [x] If you awake am of surgery not feeling well or have temperature >100 please call [phone]     GENERAL INSTRUCTIONS:     [x] Follow instructions for hydration that have been provided to you at your Pre-Admission Visit. Solid food until midnight then clear liquids. No gum, candy or mints.     [x] You may brush your teeth, but do not swallow any water     [x] Take medications as instructed with 1-2 oz of water - see additional page     [x] Use all breathing medicines and Bring inhalers day of surgery     [x] Stop herbal supplements and vitamins 5 days prior to procedure     [x] Follow preop dosing of blood thinners per physician instructions (Eliquis)     [x] Take 1/2 dose of evening insulin, but no insulin after midnight     [x] No tobacco products within 24 hours of surgery      [x] No alcohol or illegal drug use within 24 hours of surgery.     [x] Jewelry, body piercing's, eyeglasses, contact lenses and dentures are not permitted into surgery (bring cases)                            [x] Please do not wear any nail polish, make up or hair products on the day of surgery     [x] You

## 2024-10-21 NOTE — PROGRESS NOTES
[facility] PRE-ADMISSION TESTING INSTRUCTIONS    The Preadmission Testing patient is instructed accordingly using the following criteria (check applicable):    ARRIVAL INSTRUCTIONS:  [x] Parking the day of Surgery is located in the Main Entrance lot.  Upon entering the door, make an immediate right to the surgery reception desk    [x] Bring photo ID and insurance card    [x] Bring in a copy of Living will or Durable Power of  papers.    [x] Please be sure to arrange for responsible adult to provide transportation to and from the hospital    [x] Please arrange for responsible adult to be with you for the 24 hour period post procedure due to having anesthesia    [x] If you awake am of surgery not feeling well or have temperature >100 please call [phone]    GENERAL INSTRUCTIONS:    [x] Follow instructions for hydration that have been provided to you at your Pre-Admission Visit. Solid food until midnight then clear liquids. No gum, candy or mints.    [x] You may brush your teeth, but do not swallow any water    [x] Take medications as instructed with 1-2 oz of water - see additional page    [x] Use all breathing medicines and Bring inhalers day of surgery    [x] Stop herbal supplements and vitamins 5 days prior to procedure    [x] Follow preop dosing of blood thinners per physician instructions (Eliquis)    [x] Take 1/2 dose of evening insulin, but no insulin after midnight    [x] No tobacco products within 24 hours of surgery     [x] No alcohol or illegal drug use within 24 hours of surgery.    [x] Jewelry, body piercing's, eyeglasses, contact lenses and dentures are not permitted into surgery (bring cases)      [x] Please do not wear any nail polish, make up or hair products on the day of surgery    [x] You can expect a call the business day prior to procedure to notify you if your arrival time changes    [x] If you receive a survey after surgery we would greatly appreciate your
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
## 2024-10-21 NOTE — ANESTHESIA PRE PROCEDURE
\"[id number]\"     Type & Screen (If Applicable):  Lab Results   Component Value Date    ABORH A POSITIVE 02/06/2024    LABANTI NEGATIVE 02/06/2024       Drug/Infectious Status (If Applicable):  No results found for: \"HIV\", \"HEPCAB\"    COVID-19 Screening (If Applicable): No results found for: \"COVID19\"        Anesthesia Evaluation  Patient summary reviewed and Nursing notes reviewed   no history of anesthetic complications:   Airway: Mallampati: III  TM distance: >3 FB   Neck ROM: full  Mouth opening: > = 3 FB   Dental:      Comment: Multiple missing teeth especially uppers    Pulmonary: breath sounds clear to auscultation  (+)  COPD:    sleep apnea: on CPAP,                                  Cardiovascular:    (+) hypertension:, valvular problems/murmurs (Aortic Valve Replaced 2 months ago it was done endovascularly): AS, CAD:, CABG/stent (Stent  1992):, dysrhythmias: atrial fibrillation      ECG reviewed  Rhythm: regular  Rate: normal           Beta Blocker:  Dose within 24 Hrs      ROS comment: EF 60-65%    CONCLUSIONS:  - Exam indication: s/p TAVR  - Left ventricular systolic function is normal. EF = 55 ± 5% (visual est.)  - Landeros S3 prosthetic aortic valve (size #29). There is trace aortic valve  regurgitation. The peak gradient is 17 mmHg, the mean gradient is 9 mmHg and the  dimensionless valve index is 0.43.  - Exam was compared with the prior  echocardiographic exam performed on 8/15/24        Neuro/Psych:   (+) TIA            GI/Hepatic/Renal:   (+) GERD:          Endo/Other:    (+) Diabetes (140's to 190's BG)Type II DM.                 Abdominal:             Vascular: negative vascular ROS.         Other Findings:           Anesthesia Plan      spinal     ASA 4     (GA backup  Eliquis stopped 4 days ago)  Induction: intravenous.    MIPS: Postoperative opioids intended and Prophylactic antiemetics administered.  Anesthetic plan and risks discussed with patient.      Plan discussed with

## 2024-10-23 LAB
MICROORGANISM SPEC CULT: NORMAL
SPECIMEN DESCRIPTION: NORMAL

## 2024-10-25 ENCOUNTER — TELEPHONE (OUTPATIENT)
Dept: FAMILY MEDICINE CLINIC | Age: 79
End: 2024-10-25

## 2024-10-25 NOTE — TELEPHONE ENCOUNTER
We need a visit to perform this work.   If cardiology did the work then they can forward their information and see if the surgeon accepts it.

## 2024-10-25 NOTE — TELEPHONE ENCOUNTER
TOBIAS with [facility] regarding PCP pre-op clearance being necessary when Gianna states the patient has already obtained cardiologist clearance.

## 2024-10-25 NOTE — TELEPHONE ENCOUNTER
Spoke with Gianna regarding scheduling a pre-op appointment. She was upset stating she wasn't aware the PCP needed to clear the patient; per Gianna cardiology has cleared. Offered a visit with any resident Monday, she declined and stated she has little availably to come in, a visit would have to be after 1500. She states she does not want to have the surgery disrupted.     Pre-op form in folder.

## 2024-10-28 ENCOUNTER — OFFICE VISIT (OUTPATIENT)
Dept: FAMILY MEDICINE CLINIC | Age: 79
End: 2024-10-28

## 2024-10-28 VITALS
RESPIRATION RATE: 18 BRPM | TEMPERATURE: 98.3 F | HEART RATE: 72 BPM | SYSTOLIC BLOOD PRESSURE: 136 MMHG | BODY MASS INDEX: 35.7 KG/M2 | DIASTOLIC BLOOD PRESSURE: 64 MMHG | WEIGHT: 256 LBS | OXYGEN SATURATION: 97 %

## 2024-10-28 DIAGNOSIS — Z01.818 PREOP EXAMINATION: Primary | ICD-10-CM

## 2024-10-28 NOTE — PROGRESS NOTES
[facility]  Precepting [facility]    Subjective:  Preop  Left Total Knee   - with [place]   Cardio cleared     ROS otherwise negative    Past medical, surgical, family and social history were reviewed, non-contributory, and unchanged unless otherwise stated.    Objective:    /64   Pulse 72   Temp 98.3 °F (36.8 °C) (Temporal)   Resp 18   Wt 116.1 kg (256 lb)   SpO2 97%   BMI 35.70 kg/m²     Exam is as noted by resident with the following changes, additions or corrections:    Assessment/Plan:    Preop:   - Cardio Cleared   - operation is tomorrow   - Ok to proceed      Attending Physician Statement  I have reviewed the chart, including any radiology or labs, and have seen the patient with the resident(s).  I personally reviewed and performed key elements of the history and exam.  I agree with the assessment, plan and orders as documented by the resident.  Please refer to the resident note for additional information.      Electronically signed by Dilip Lu MD on 10/28/2024 at 8:57 AM    
10/21/2024 <0.03     Sodium 10/21/2024 143     Potassium 10/21/2024 4.2     Chloride 10/21/2024 106     CO2 10/21/2024 28     Anion Gap 10/21/2024 9     Glucose 10/21/2024 174 (H)     BUN 10/21/2024 23     Creatinine 10/21/2024 1.5 (H)     Est, Glom Filt Rate 10/21/2024 46 (L)     Calcium 10/21/2024 9.0            Assessment:       79 y.o. patient with planned surgery as above.    Known risk factors for perioperative complications: Arrhythmia, Coronary artery disease, COPD  Current medications which may produce withdrawal symptoms if withheld perioperatively: already holding eliquis      Plan:     Preoperative workup as follows: none  Pre-Operative Risk assessment using 2014 ACC/AHA guidelines     Emergent procedure No  Active Cardiac Condition Yes (decompensated HF, Arrhythmia, MI <3 weeks, severe valve disease)  Risk Level of Procedure Intermediate Risk (intraperitoneal, intrathoracic, HENT, orthopedic, or carotid endarterectomy, etc.)  Revised Cardiac Risk Index Risk factors: History of ischemic heart disease  History of heart failure  Measurement of Exercise Tolerance before Surgery >4 Yes    According to the 2014 ACC/AHA pre-operative risk assessment guidelines Mason Gudino is a low risk for major cardiac complications during a intermediate risk procedure and may continue as planned. Specific medication recommendations are listed below. Medications recommended to continue should be taken with a sip of water even when NPO.     Further recommendations from consultants: None    Medication Recommendations:  Has been holding eliquis       Prophylaxis for cardiac events with perioperative beta-blockers: Not indicated  ACC/AHA indications for pre-operative beta-blocker use:    Vascular surgery with history of postitive stress test  Intermediate or high risk surgery with history of CAD   Intermediate or high risk surgery with multiple clinical predictors of CAD- 2 of the following: history of compensated or prior

## 2024-10-29 ENCOUNTER — APPOINTMENT (OUTPATIENT)
Dept: GENERAL RADIOLOGY | Age: 79
End: 2024-10-29
Attending: ORTHOPAEDIC SURGERY
Payer: MEDICARE

## 2024-10-29 ENCOUNTER — ANESTHESIA (OUTPATIENT)
Dept: OPERATING ROOM | Age: 79
End: 2024-10-29
Payer: MEDICARE

## 2024-10-29 ENCOUNTER — HOSPITAL ENCOUNTER (OUTPATIENT)
Age: 79
Setting detail: OBSERVATION
Discharge: HOME HEALTH | End: 2024-10-30
Attending: ORTHOPAEDIC SURGERY | Admitting: ORTHOPAEDIC SURGERY
Payer: MEDICARE

## 2024-10-29 DIAGNOSIS — M17.12 OSTEOARTHRITIS OF LEFT KNEE: ICD-10-CM

## 2024-10-29 DIAGNOSIS — Z96.652 STATUS POST LEFT KNEE REPLACEMENT: ICD-10-CM

## 2024-10-29 DIAGNOSIS — Z01.818 PRE-OP TESTING: Primary | ICD-10-CM

## 2024-10-29 PROCEDURE — 3600000005 HC SURGERY LEVEL 5 BASE: Performed by: ORTHOPAEDIC SURGERY

## 2024-10-29 PROCEDURE — 6360000002 HC RX W HCPCS: Performed by: ORTHOPAEDIC SURGERY

## 2024-10-29 PROCEDURE — G0378 HOSPITAL OBSERVATION PER HR: HCPCS

## 2024-10-29 PROCEDURE — 3600000015 HC SURGERY LEVEL 5 ADDTL 15MIN: Performed by: ORTHOPAEDIC SURGERY

## 2024-10-29 PROCEDURE — 7100000001 HC PACU RECOVERY - ADDTL 15 MIN: Performed by: ORTHOPAEDIC SURGERY

## 2024-10-29 PROCEDURE — C1776 JOINT DEVICE (IMPLANTABLE): HCPCS | Performed by: ORTHOPAEDIC SURGERY

## 2024-10-29 PROCEDURE — 88305 TISSUE EXAM BY PATHOLOGIST: CPT

## 2024-10-29 PROCEDURE — 97161 PT EVAL LOW COMPLEX 20 MIN: CPT

## 2024-10-29 PROCEDURE — 3700000001 HC ADD 15 MINUTES (ANESTHESIA): Performed by: ORTHOPAEDIC SURGERY

## 2024-10-29 PROCEDURE — 0055T BONE SRGRY CMPTR CT/MRI IMAG: CPT | Performed by: ORTHOPAEDIC SURGERY

## 2024-10-29 PROCEDURE — 51701 INSERT BLADDER CATHETER: CPT

## 2024-10-29 PROCEDURE — 2580000003 HC RX 258: Performed by: NURSE PRACTITIONER

## 2024-10-29 PROCEDURE — 73560 X-RAY EXAM OF KNEE 1 OR 2: CPT

## 2024-10-29 PROCEDURE — 2500000003 HC RX 250 WO HCPCS: Performed by: ORTHOPAEDIC SURGERY

## 2024-10-29 PROCEDURE — 51798 US URINE CAPACITY MEASURE: CPT

## 2024-10-29 PROCEDURE — 6360000002 HC RX W HCPCS: Performed by: ANESTHESIOLOGY

## 2024-10-29 PROCEDURE — 27447 TOTAL KNEE ARTHROPLASTY: CPT | Performed by: NURSE PRACTITIONER

## 2024-10-29 PROCEDURE — 27447 TOTAL KNEE ARTHROPLASTY: CPT | Performed by: ORTHOPAEDIC SURGERY

## 2024-10-29 PROCEDURE — 6360000002 HC RX W HCPCS: Performed by: NURSE PRACTITIONER

## 2024-10-29 PROCEDURE — 88311 DECALCIFY TISSUE: CPT

## 2024-10-29 PROCEDURE — 2580000003 HC RX 258: Performed by: NURSE ANESTHETIST, CERTIFIED REGISTERED

## 2024-10-29 PROCEDURE — 6370000000 HC RX 637 (ALT 250 FOR IP): Performed by: NURSE PRACTITIONER

## 2024-10-29 PROCEDURE — 94640 AIRWAY INHALATION TREATMENT: CPT

## 2024-10-29 PROCEDURE — 6370000000 HC RX 637 (ALT 250 FOR IP): Performed by: ORTHOPAEDIC SURGERY

## 2024-10-29 PROCEDURE — 7100000000 HC PACU RECOVERY - FIRST 15 MIN: Performed by: ORTHOPAEDIC SURGERY

## 2024-10-29 PROCEDURE — 2720000010 HC SURG SUPPLY STERILE: Performed by: ORTHOPAEDIC SURGERY

## 2024-10-29 PROCEDURE — C1713 ANCHOR/SCREW BN/BN,TIS/BN: HCPCS | Performed by: ORTHOPAEDIC SURGERY

## 2024-10-29 PROCEDURE — 6360000002 HC RX W HCPCS: Performed by: NURSE ANESTHETIST, CERTIFIED REGISTERED

## 2024-10-29 PROCEDURE — 3700000000 HC ANESTHESIA ATTENDED CARE: Performed by: ORTHOPAEDIC SURGERY

## 2024-10-29 PROCEDURE — 2709999900 HC NON-CHARGEABLE SUPPLY: Performed by: ORTHOPAEDIC SURGERY

## 2024-10-29 PROCEDURE — 64447 NJX AA&/STRD FEMORAL NRV IMG: CPT | Performed by: ANESTHESIOLOGY

## 2024-10-29 PROCEDURE — 97165 OT EVAL LOW COMPLEX 30 MIN: CPT

## 2024-10-29 PROCEDURE — 2580000003 HC RX 258: Performed by: ORTHOPAEDIC SURGERY

## 2024-10-29 DEVICE — TIBIAL BEARING INSERT - CS
Type: IMPLANTABLE DEVICE | Site: KNEE | Status: FUNCTIONAL
Brand: TRIATHLON

## 2024-10-29 DEVICE — TIBIAL COMPONENT
Type: IMPLANTABLE DEVICE | Site: KNEE | Status: FUNCTIONAL
Brand: TRIATHLON

## 2024-10-29 DEVICE — CRUCIATE RETAINING FEMORAL
Type: IMPLANTABLE DEVICE | Site: KNEE | Status: FUNCTIONAL
Brand: TRIATHLON

## 2024-10-29 DEVICE — PATELLA
Type: IMPLANTABLE DEVICE | Site: KNEE | Status: FUNCTIONAL
Brand: TRIATHLON

## 2024-10-29 RX ORDER — SODIUM CHLORIDE 0.9 % (FLUSH) 0.9 %
5-40 SYRINGE (ML) INJECTION EVERY 12 HOURS SCHEDULED
Status: DISCONTINUED | OUTPATIENT
Start: 2024-10-29 | End: 2024-10-30 | Stop reason: HOSPADM

## 2024-10-29 RX ORDER — DEXAMETHASONE SODIUM PHOSPHATE 10 MG/ML
8 INJECTION, SOLUTION INTRAMUSCULAR; INTRAVENOUS ONCE
Status: DISCONTINUED | OUTPATIENT
Start: 2024-10-29 | End: 2024-10-29 | Stop reason: HOSPADM

## 2024-10-29 RX ORDER — ROPIVACAINE HYDROCHLORIDE 5 MG/ML
30 INJECTION, SOLUTION EPIDURAL; INFILTRATION; PERINEURAL
Status: DISCONTINUED | OUTPATIENT
Start: 2024-10-29 | End: 2024-10-29 | Stop reason: HOSPADM

## 2024-10-29 RX ORDER — SODIUM CHLORIDE 0.9 % (FLUSH) 0.9 %
5-40 SYRINGE (ML) INJECTION EVERY 12 HOURS SCHEDULED
Status: DISCONTINUED | OUTPATIENT
Start: 2024-10-29 | End: 2024-10-29 | Stop reason: HOSPADM

## 2024-10-29 RX ORDER — KETOROLAC TROMETHAMINE 30 MG/ML
15 INJECTION, SOLUTION INTRAMUSCULAR; INTRAVENOUS ONCE
Status: COMPLETED | OUTPATIENT
Start: 2024-10-29 | End: 2024-10-29

## 2024-10-29 RX ORDER — VANCOMYCIN HYDROCHLORIDE 1 G/20ML
INJECTION, POWDER, LYOPHILIZED, FOR SOLUTION INTRAVENOUS PRN
Status: DISCONTINUED | OUTPATIENT
Start: 2024-10-29 | End: 2024-10-29 | Stop reason: ALTCHOICE

## 2024-10-29 RX ORDER — SODIUM CHLORIDE 9 MG/ML
INJECTION, SOLUTION INTRAVENOUS PRN
Status: DISCONTINUED | OUTPATIENT
Start: 2024-10-29 | End: 2024-10-29 | Stop reason: HOSPADM

## 2024-10-29 RX ORDER — METOPROLOL SUCCINATE 50 MG/1
50 TABLET, EXTENDED RELEASE ORAL EVERY MORNING
Status: DISCONTINUED | OUTPATIENT
Start: 2024-10-30 | End: 2024-10-30 | Stop reason: HOSPADM

## 2024-10-29 RX ORDER — PHENYLEPHRINE HCL IN 0.9% NACL 1 MG/10 ML
SYRINGE (ML) INTRAVENOUS
Status: DISCONTINUED | OUTPATIENT
Start: 2024-10-29 | End: 2024-10-29 | Stop reason: SDUPTHER

## 2024-10-29 RX ORDER — SODIUM CHLORIDE 9 MG/ML
INJECTION, SOLUTION INTRAVENOUS PRN
Status: DISCONTINUED | OUTPATIENT
Start: 2024-10-29 | End: 2024-10-30 | Stop reason: HOSPADM

## 2024-10-29 RX ORDER — BUPIVACAINE HYDROCHLORIDE 7.5 MG/ML
INJECTION, SOLUTION INTRASPINAL
Status: DISCONTINUED | OUTPATIENT
Start: 2024-10-29 | End: 2024-10-29 | Stop reason: SDUPTHER

## 2024-10-29 RX ORDER — AMIODARONE HYDROCHLORIDE 200 MG/1
200 TABLET ORAL EVERY MORNING
Status: DISCONTINUED | OUTPATIENT
Start: 2024-10-30 | End: 2024-10-30 | Stop reason: HOSPADM

## 2024-10-29 RX ORDER — TAMSULOSIN HYDROCHLORIDE 0.4 MG/1
0.4 CAPSULE ORAL EVERY MORNING
Status: DISCONTINUED | OUTPATIENT
Start: 2024-10-29 | End: 2024-10-30 | Stop reason: HOSPADM

## 2024-10-29 RX ORDER — ROPIVACAINE HYDROCHLORIDE 5 MG/ML
30 INJECTION, SOLUTION EPIDURAL; INFILTRATION; PERINEURAL ONCE
Status: DISCONTINUED | OUTPATIENT
Start: 2024-10-29 | End: 2024-10-29 | Stop reason: HOSPADM

## 2024-10-29 RX ORDER — OXYCODONE AND ACETAMINOPHEN 5; 325 MG/1; MG/1
1 TABLET ORAL EVERY 6 HOURS PRN
Qty: 28 TABLET | Refills: 0 | Status: ON HOLD | OUTPATIENT
Start: 2024-10-29 | End: 2024-11-07

## 2024-10-29 RX ORDER — MIDAZOLAM HYDROCHLORIDE 2 MG/2ML
1 INJECTION, SOLUTION INTRAMUSCULAR; INTRAVENOUS EVERY 5 MIN PRN
Status: DISCONTINUED | OUTPATIENT
Start: 2024-10-29 | End: 2024-10-29 | Stop reason: HOSPADM

## 2024-10-29 RX ORDER — ONDANSETRON 2 MG/ML
4 INJECTION INTRAMUSCULAR; INTRAVENOUS
Status: DISCONTINUED | OUTPATIENT
Start: 2024-10-29 | End: 2024-10-29 | Stop reason: HOSPADM

## 2024-10-29 RX ORDER — ONDANSETRON 2 MG/ML
4 INJECTION INTRAMUSCULAR; INTRAVENOUS EVERY 6 HOURS PRN
Status: DISCONTINUED | OUTPATIENT
Start: 2024-10-29 | End: 2024-10-30 | Stop reason: HOSPADM

## 2024-10-29 RX ORDER — DONEPEZIL HYDROCHLORIDE 10 MG/1
10 TABLET, FILM COATED ORAL NIGHTLY
Status: DISCONTINUED | OUTPATIENT
Start: 2024-10-29 | End: 2024-10-30 | Stop reason: HOSPADM

## 2024-10-29 RX ORDER — ONDANSETRON 4 MG/1
4 TABLET, ORALLY DISINTEGRATING ORAL EVERY 8 HOURS PRN
Status: DISCONTINUED | OUTPATIENT
Start: 2024-10-29 | End: 2024-10-30 | Stop reason: HOSPADM

## 2024-10-29 RX ORDER — TRANEXAMIC ACID 10 MG/ML
1000 INJECTION, SOLUTION INTRAVENOUS
Status: COMPLETED | OUTPATIENT
Start: 2024-10-29 | End: 2024-10-29

## 2024-10-29 RX ORDER — TRANEXAMIC ACID 10 MG/ML
1000 INJECTION, SOLUTION INTRAVENOUS ONCE
Status: COMPLETED | OUTPATIENT
Start: 2024-10-29 | End: 2024-10-29

## 2024-10-29 RX ORDER — ATORVASTATIN CALCIUM 40 MG/1
40 TABLET, FILM COATED ORAL DAILY
Status: DISCONTINUED | OUTPATIENT
Start: 2024-10-29 | End: 2024-10-30 | Stop reason: HOSPADM

## 2024-10-29 RX ORDER — NALOXONE HYDROCHLORIDE 0.4 MG/ML
INJECTION, SOLUTION INTRAMUSCULAR; INTRAVENOUS; SUBCUTANEOUS PRN
Status: DISCONTINUED | OUTPATIENT
Start: 2024-10-29 | End: 2024-10-29 | Stop reason: HOSPADM

## 2024-10-29 RX ORDER — FENTANYL CITRATE 50 UG/ML
50 INJECTION, SOLUTION INTRAMUSCULAR; INTRAVENOUS EVERY 10 MIN PRN
Status: DISCONTINUED | OUTPATIENT
Start: 2024-10-29 | End: 2024-10-29 | Stop reason: HOSPADM

## 2024-10-29 RX ORDER — SODIUM CHLORIDE 9 MG/ML
INJECTION, SOLUTION INTRAVENOUS
Status: DISCONTINUED | OUTPATIENT
Start: 2024-10-29 | End: 2024-10-29 | Stop reason: SDUPTHER

## 2024-10-29 RX ORDER — ALBUTEROL SULFATE 90 UG/1
2 INHALANT RESPIRATORY (INHALATION) EVERY 6 HOURS PRN
Status: DISCONTINUED | OUTPATIENT
Start: 2024-10-29 | End: 2024-10-29

## 2024-10-29 RX ORDER — PROPOFOL 10 MG/ML
INJECTION, EMULSION INTRAVENOUS
Status: DISCONTINUED | OUTPATIENT
Start: 2024-10-29 | End: 2024-10-29 | Stop reason: SDUPTHER

## 2024-10-29 RX ORDER — MORPHINE SULFATE 2 MG/ML
2 INJECTION, SOLUTION INTRAMUSCULAR; INTRAVENOUS EVERY 4 HOURS PRN
Status: DISCONTINUED | OUTPATIENT
Start: 2024-10-29 | End: 2024-10-30 | Stop reason: HOSPADM

## 2024-10-29 RX ORDER — DEXAMETHASONE SODIUM PHOSPHATE 4 MG/ML
INJECTION, SOLUTION INTRA-ARTICULAR; INTRALESIONAL; INTRAMUSCULAR; INTRAVENOUS; SOFT TISSUE
Status: DISCONTINUED | OUTPATIENT
Start: 2024-10-29 | End: 2024-10-29 | Stop reason: SDUPTHER

## 2024-10-29 RX ORDER — FUROSEMIDE 40 MG/1
40 TABLET ORAL DAILY
Status: DISCONTINUED | OUTPATIENT
Start: 2024-10-29 | End: 2024-10-30 | Stop reason: HOSPADM

## 2024-10-29 RX ORDER — OXYCODONE HYDROCHLORIDE 5 MG/1
10 TABLET ORAL EVERY 4 HOURS PRN
Status: DISCONTINUED | OUTPATIENT
Start: 2024-10-29 | End: 2024-10-30 | Stop reason: HOSPADM

## 2024-10-29 RX ORDER — SODIUM CHLORIDE 0.9 % (FLUSH) 0.9 %
5-40 SYRINGE (ML) INJECTION PRN
Status: DISCONTINUED | OUTPATIENT
Start: 2024-10-29 | End: 2024-10-29 | Stop reason: HOSPADM

## 2024-10-29 RX ORDER — FENTANYL CITRATE 50 UG/ML
100 INJECTION, SOLUTION INTRAMUSCULAR; INTRAVENOUS ONCE
Status: COMPLETED | OUTPATIENT
Start: 2024-10-29 | End: 2024-10-29

## 2024-10-29 RX ORDER — ALBUTEROL SULFATE 0.83 MG/ML
2.5 SOLUTION RESPIRATORY (INHALATION) EVERY 6 HOURS PRN
Status: DISCONTINUED | OUTPATIENT
Start: 2024-10-29 | End: 2024-10-30 | Stop reason: HOSPADM

## 2024-10-29 RX ORDER — OXYCODONE HYDROCHLORIDE 5 MG/1
5 TABLET ORAL EVERY 4 HOURS PRN
Status: DISCONTINUED | OUTPATIENT
Start: 2024-10-29 | End: 2024-10-30 | Stop reason: HOSPADM

## 2024-10-29 RX ORDER — ONDANSETRON 2 MG/ML
INJECTION INTRAMUSCULAR; INTRAVENOUS
Status: DISCONTINUED | OUTPATIENT
Start: 2024-10-29 | End: 2024-10-29 | Stop reason: SDUPTHER

## 2024-10-29 RX ORDER — MIDAZOLAM HYDROCHLORIDE 2 MG/2ML
1 INJECTION, SOLUTION INTRAMUSCULAR; INTRAVENOUS PRN
Status: DISCONTINUED | OUTPATIENT
Start: 2024-10-29 | End: 2024-10-29 | Stop reason: HOSPADM

## 2024-10-29 RX ORDER — SODIUM CHLORIDE 0.9 % (FLUSH) 0.9 %
5-40 SYRINGE (ML) INJECTION PRN
Status: DISCONTINUED | OUTPATIENT
Start: 2024-10-29 | End: 2024-10-30 | Stop reason: HOSPADM

## 2024-10-29 RX ORDER — ACETAMINOPHEN 325 MG/1
650 TABLET ORAL EVERY 6 HOURS SCHEDULED
Status: DISCONTINUED | OUTPATIENT
Start: 2024-10-29 | End: 2024-10-30 | Stop reason: HOSPADM

## 2024-10-29 RX ORDER — ACETAMINOPHEN 500 MG
1000 TABLET ORAL ONCE
Status: COMPLETED | OUTPATIENT
Start: 2024-10-29 | End: 2024-10-29

## 2024-10-29 RX ADMIN — ACETAMINOPHEN 1000 MG: 500 TABLET ORAL at 05:56

## 2024-10-29 RX ADMIN — PROPOFOL 100 MG: 10 INJECTION, EMULSION INTRAVENOUS at 06:59

## 2024-10-29 RX ADMIN — ALBUTEROL SULFATE 2.5 MG: 0.83 SOLUTION RESPIRATORY (INHALATION) at 17:42

## 2024-10-29 RX ADMIN — FUROSEMIDE 40 MG: 40 TABLET ORAL at 13:07

## 2024-10-29 RX ADMIN — IPRATROPIUM BROMIDE 0.5 MG: 0.5 SOLUTION RESPIRATORY (INHALATION) at 12:38

## 2024-10-29 RX ADMIN — BUPIVACAINE HYDROCHLORIDE IN DEXTROSE 1.5 ML: 7.5 INJECTION, SOLUTION SUBARACHNOID at 06:58

## 2024-10-29 RX ADMIN — TRANEXAMIC ACID 1000 MG: 10 INJECTION, SOLUTION INTRAVENOUS at 09:35

## 2024-10-29 RX ADMIN — ATORVASTATIN CALCIUM 40 MG: 40 TABLET, FILM COATED ORAL at 13:10

## 2024-10-29 RX ADMIN — MIDAZOLAM HYDROCHLORIDE 1 MG: 1 INJECTION, SOLUTION INTRAMUSCULAR; INTRAVENOUS at 06:26

## 2024-10-29 RX ADMIN — WATER 2000 MG: 1 INJECTION INTRAMUSCULAR; INTRAVENOUS; SUBCUTANEOUS at 06:47

## 2024-10-29 RX ADMIN — SODIUM CHLORIDE, PRESERVATIVE FREE 10 ML: 5 INJECTION INTRAVENOUS at 15:24

## 2024-10-29 RX ADMIN — KETOROLAC TROMETHAMINE 15 MG: 30 INJECTION, SOLUTION INTRAMUSCULAR at 05:56

## 2024-10-29 RX ADMIN — SODIUM CHLORIDE: 9 INJECTION, SOLUTION INTRAVENOUS at 06:47

## 2024-10-29 RX ADMIN — Medication 200 MCG: at 08:38

## 2024-10-29 RX ADMIN — DONEPEZIL HYDROCHLORIDE 10 MG: 10 TABLET, FILM COATED ORAL at 21:38

## 2024-10-29 RX ADMIN — DEXAMETHASONE SODIUM PHOSPHATE 10 MG: 4 INJECTION, SOLUTION INTRAMUSCULAR; INTRAVENOUS at 07:08

## 2024-10-29 RX ADMIN — FENTANYL CITRATE 50 MCG: 50 INJECTION INTRAMUSCULAR; INTRAVENOUS at 06:26

## 2024-10-29 RX ADMIN — TAMSULOSIN HYDROCHLORIDE 0.4 MG: 0.4 CAPSULE ORAL at 13:07

## 2024-10-29 RX ADMIN — Medication 200 MCG: at 08:21

## 2024-10-29 RX ADMIN — Medication 200 MCG: at 07:52

## 2024-10-29 RX ADMIN — OXYCODONE 5 MG: 5 TABLET ORAL at 15:19

## 2024-10-29 RX ADMIN — APIXABAN 2.5 MG: 2.5 TABLET, FILM COATED ORAL at 21:38

## 2024-10-29 RX ADMIN — ACETAMINOPHEN 650 MG: 325 TABLET ORAL at 13:10

## 2024-10-29 RX ADMIN — SODIUM CHLORIDE, PRESERVATIVE FREE 10 ML: 5 INJECTION INTRAVENOUS at 21:39

## 2024-10-29 RX ADMIN — Medication 200 MCG: at 07:36

## 2024-10-29 RX ADMIN — CEFAZOLIN 2000 MG: 2 INJECTION, POWDER, FOR SOLUTION INTRAMUSCULAR; INTRAVENOUS at 15:21

## 2024-10-29 RX ADMIN — ONDANSETRON 4 MG: 2 INJECTION INTRAMUSCULAR; INTRAVENOUS at 07:08

## 2024-10-29 RX ADMIN — ACETAMINOPHEN 650 MG: 325 TABLET ORAL at 17:46

## 2024-10-29 RX ADMIN — TRANEXAMIC ACID 1000 MG: 10 INJECTION, SOLUTION INTRAVENOUS at 06:47

## 2024-10-29 RX ADMIN — PROPOFOL 100 MCG/KG/MIN: 10 INJECTION, EMULSION INTRAVENOUS at 07:00

## 2024-10-29 RX ADMIN — IPRATROPIUM BROMIDE 0.5 MG: 0.5 SOLUTION RESPIRATORY (INHALATION) at 17:43

## 2024-10-29 ASSESSMENT — PAIN - FUNCTIONAL ASSESSMENT
PAIN_FUNCTIONAL_ASSESSMENT: PREVENTS OR INTERFERES SOME ACTIVE ACTIVITIES AND ADLS
PAIN_FUNCTIONAL_ASSESSMENT: PREVENTS OR INTERFERES SOME ACTIVE ACTIVITIES AND ADLS
PAIN_FUNCTIONAL_ASSESSMENT: 0-10
PAIN_FUNCTIONAL_ASSESSMENT: PREVENTS OR INTERFERES SOME ACTIVE ACTIVITIES AND ADLS

## 2024-10-29 ASSESSMENT — PAIN DESCRIPTION - ORIENTATION
ORIENTATION: LEFT

## 2024-10-29 ASSESSMENT — PAIN DESCRIPTION - DESCRIPTORS
DESCRIPTORS: DISCOMFORT
DESCRIPTORS: ACHING
DESCRIPTORS: ACHING
DESCRIPTORS: DISCOMFORT
DESCRIPTORS: ACHING
DESCRIPTORS: DISCOMFORT

## 2024-10-29 ASSESSMENT — PAIN DESCRIPTION - LOCATION
LOCATION: KNEE

## 2024-10-29 ASSESSMENT — PAIN SCALES - GENERAL
PAINLEVEL_OUTOF10: 3
PAINLEVEL_OUTOF10: 0
PAINLEVEL_OUTOF10: 3
PAINLEVEL_OUTOF10: 1

## 2024-10-29 NOTE — PROGRESS NOTES
4 Eyes Skin Assessment     NAME:  Mason Gudino  YOB: 1945  MEDICAL RECORD NUMBER:  [id number]    The patient is being assessed for  Admission    I agree that at least one RN has performed a thorough Head to Toe Skin Assessment on the patient. ALL assessment sites listed below have been assessed.      Areas assessed by both nurses:    Head, Face, Ears, Shoulders, Back, Chest, Arms, Elbows, Hands, Sacrum. Buttock, Coccyx, Ischium, and Legs. Feet and Heels        Does the Patient have a Wound? No noted wound(s)  Blanchable redness to coccyx  Jace Prevention initiated by RN: No  Wound Care Orders initiated by RN: No    Pressure Injury (Stage 3,4, Unstageable, DTI, NWPT, and Complex wounds) if present, place Wound referral order by RN under : No    New Ostomies, if present place, Ostomy referral order under : No     Nurse 1 eSignature: Electronically signed by Malka Gross RN on 10/29/24 at 12:16 PM EDT    **SHARE this note so that the co-signing nurse can place an eSignature**    Nurse 2 eSignature: Electronically signed by Betsy Boudreaux RN on 10/29/24 at 4:07 PM EDT

## 2024-10-29 NOTE — ANESTHESIA POSTPROCEDURE EVALUATION
Department of Anesthesiology  Postprocedure Note    Patient: Mason Gudino  MRN: [id number]  YOB: 1945  Date of evaluation: 10/29/2024    Procedure Summary       Date: 10/29/24 Room / Location: [address]    Anesthesia Start: 0647 Anesthesia Stop: 0904    Procedure: LEFT KNEE ADORE ROBOTIC ASSISTED TOTAL ARTHROPLASTY (Left: Knee) Diagnosis:       Osteoarthritis of left knee      (Osteoarthritis of left knee [M17.12])    Surgeons: Lázaro Blackburn MD Responsible Provider: Ruslan Bush DO    Anesthesia Type: spinal ASA Status: 4            Anesthesia Type: No value filed.    Andres Phase I: Andres Score: 10    Andres Phase II:      Anesthesia Post Evaluation    Patient location during evaluation: PACU  Patient participation: complete - patient participated  Level of consciousness: awake and alert  Pain score: 1  Airway patency: patent  Nausea & Vomiting: no nausea and no vomiting  Cardiovascular status: hemodynamically stable  Respiratory status: acceptable  Hydration status: euvolemic  Pain management: adequate    No notable events documented.

## 2024-10-29 NOTE — ACP (ADVANCE CARE PLANNING)
Advance Care Planning   Healthcare Decision Maker:    Primary Decision Maker: Mason Gudino - Child - [phone]    Secondary Decision Maker: Gianna Villalobos - Child - [phone]    Click here to complete Healthcare Decision Makers including selection of the Healthcare Decision Maker Relationship (ie \"Primary\").
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
## 2024-10-29 NOTE — CARE COORDINATION
Met with patient about diagnosis and discharge plan of care. Post op left TKA. Pt seen in ortho class. Lives with daughter in ranch home, 2/1 step/rail into home. Pt has wheeled walker, high commode and walk in shower with seat. Plan is home with [facility]-orders completed and notified. PCP is dr Elkins. Will follow-o

## 2024-10-29 NOTE — PROGRESS NOTES
Report/Handoff called to RN on 7S. All belongings sent with patient, and available family has been updated.

## 2024-10-29 NOTE — PLAN OF CARE
Problem: Discharge Planning  Goal: Discharge to home or other facility with appropriate resources  Outcome: Progressing     Problem: Pain  Goal: Verbalizes/displays adequate comfort level or baseline comfort level  Outcome: Progressing     Problem: Chronic Conditions and Co-morbidities  Goal: Patient's chronic conditions and co-morbidity symptoms are monitored and maintained or improved  Outcome: Progressing     Problem: ABCDS Injury Assessment  Goal: Absence of physical injury  Outcome: Progressing     Problem: Safety - Adult  Goal: Free from fall injury  Outcome: Progressing

## 2024-10-29 NOTE — ANESTHESIA PROCEDURE NOTES
Spinal Block    Patient location during procedure: OR  End time: 10/29/2024 6:58 AM  Reason for block: primary anesthetic and at surgeon's request  Staffing  Performed by: Wale Winters APRN - CRNA  Authorized by: Ruslan Bush,     Spinal Block  Patient position: sitting  Prep: Betadine and site prepped and draped  Patient monitoring: cardiac monitor, continuous pulse ox, continuous capnometry and frequent blood pressure checks  Approach: midline  Location: L3/L4  Provider prep: mask, sterile gloves and sterile gown  Local infiltration: lidocaine  Needle  Needle type: Pencan   Needle gauge: 25 G  Needle length: 3.5 in  Assessment  Sensory level: T6  Swirl obtained: Yes  CSF: clear  Attempts: 1  Hemodynamics: stable  Preanesthetic Checklist  Completed: patient identified, IV checked, site marked, risks and benefits discussed, surgical/procedural consents, equipment checked, pre-op evaluation, timeout performed, anesthesia consent given, oxygen available, monitors applied/VS acknowledged and fire risk safety assessment completed and verbalized

## 2024-10-29 NOTE — ANESTHESIA PROCEDURE NOTES
Peripheral Block    Patient location during procedure: procedure area  Reason for block: post-op pain management and at surgeon's request  Start time: 10/29/2024 6:27 AM  End time: 10/29/2024 6:32 AM  Staffing  Performed: anesthesiologist   Anesthesiologist: Ruslan Bush DO  Performed by: Ruslan Bush DO  Authorized by: Ruslan Bush DO    Preanesthetic Checklist  Completed: patient identified, IV checked, site marked, risks and benefits discussed, surgical/procedural consents, equipment checked, pre-op evaluation, timeout performed, anesthesia consent given, oxygen available, monitors applied/VS acknowledged, fire risk safety assessment completed and verbalized and blood product R/B/A discussed and consented  Peripheral Block   Patient position: supine  Prep: ChloraPrep  Provider prep: mask and sterile gloves  Patient monitoring: cardiac monitor, continuous pulse ox, frequent blood pressure checks, IV access, oxygen and responsive to questions  Block type: Femoral  Adductor canal  Laterality: left  Injection technique: single-shot  Guidance: ultrasound guided  Local infiltration: ropivacaine  Infiltration strength: 0.5 %  Local infiltration: ropivacaine  Dose: 30 mL    Needle   Needle type: insulated echogenic nerve stimulator needle (Stimuplex)   Needle gauge: 20 G  Needle localization: ultrasound guidance  Needle length: 10 cm  Assessment   Injection assessment: negative aspiration for heme, no paresthesia on injection and local visualized surrounding nerve on ultrasound  Slow fractionated injection: yes  Hemodynamics: stable  Outcomes: uncomplicated and patient tolerated procedure well
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
## 2024-10-29 NOTE — PROGRESS NOTES
Occupational Therapy  OCCUPATIONAL THERAPY INITIAL EVALUATION  [facility]  [address], [place]    Date: 10/29/2024     Patient Name: Mason Gudino  MRN: [id number]  : 1945  Room: [address] [place] [address]    Evaluating OT: Monica Saini, OTR/L - [id number]    Referring Provider: Clayton Ervin, WALI - CNP 10  Specific Provider Orders/Date: \"OT eval and treat\" - 10/29/2024    Diagnosis: Osteoarthritis of left knee [M17.12]  Status post left knee replacement [Z96.652]    Surgery: Patient underwent L TKA on 10/29/2024.  Pertinent Medical History: aortic stenosis, CAD, DJD, HLD, HTN, R TKA    Precautions: fall risk, WBAT LLE    Assessment of Current Deficits:    [x] Functional mobility   [x]ADLs  [x] Strength               []Cognition   [x] Functional transfers   [x] IADLs         [x] Safety Awareness   [x]Endurance   [] Fine Coordination              [x] Balance      [] Vision/perception   []Sensation    []Gross Motor Coordination  [] ROM  [] Delirium                   [] Motor Control     OT PLAN OF CARE   OT POC is based on physician orders, patient diagnosis, and results of clinical assessment.  Frequency/Duration 2-5 days/week for 2 weeks PRN   Specific OT Treatment Interventions to Include:   * Instruction/training on adapted ADL techniques and AE recommendations to increase functional independence within precautions       * Training on energy conservation strategies, correct breathing pattern and techniques to improve independence/tolerance for self-care routine  * Functional transfer/mobility training/DME recommendations for increased independence, safety, and fall prevention  * Patient/Family education to increase follow through with safety techniques and functional independence  * Recommendation of environmental modifications for increased safety with functional transfers/mobility and ADLs  * Therapeutic exercise to improve motor endurance,

## 2024-10-30 VITALS
DIASTOLIC BLOOD PRESSURE: 54 MMHG | SYSTOLIC BLOOD PRESSURE: 109 MMHG | TEMPERATURE: 98.2 F | RESPIRATION RATE: 20 BRPM | HEART RATE: 61 BPM | OXYGEN SATURATION: 100 % | WEIGHT: 256 LBS | HEIGHT: 71 IN | BODY MASS INDEX: 35.84 KG/M2

## 2024-10-30 LAB
ERYTHROCYTE [DISTWIDTH] IN BLOOD BY AUTOMATED COUNT: 12.7 % (ref 11.5–15)
HCT VFR BLD AUTO: 33.4 % (ref 37–54)
HGB BLD-MCNC: 11.2 G/DL (ref 12.5–16.5)
MCH RBC QN AUTO: 32.1 PG (ref 26–35)
MCHC RBC AUTO-ENTMCNC: 33.5 G/DL (ref 32–34.5)
MCV RBC AUTO: 95.7 FL (ref 80–99.9)
PLATELET # BLD AUTO: 170 K/UL (ref 130–450)
PMV BLD AUTO: 10.2 FL (ref 7–12)
RBC # BLD AUTO: 3.49 M/UL (ref 3.8–5.8)
WBC OTHER # BLD: 15.8 K/UL (ref 4.5–11.5)

## 2024-10-30 PROCEDURE — 97530 THERAPEUTIC ACTIVITIES: CPT

## 2024-10-30 PROCEDURE — 6370000000 HC RX 637 (ALT 250 FOR IP): Performed by: NURSE PRACTITIONER

## 2024-10-30 PROCEDURE — 51798 US URINE CAPACITY MEASURE: CPT

## 2024-10-30 PROCEDURE — 2580000003 HC RX 258: Performed by: NURSE PRACTITIONER

## 2024-10-30 PROCEDURE — 97110 THERAPEUTIC EXERCISES: CPT

## 2024-10-30 PROCEDURE — G0378 HOSPITAL OBSERVATION PER HR: HCPCS

## 2024-10-30 PROCEDURE — 85027 COMPLETE CBC AUTOMATED: CPT

## 2024-10-30 PROCEDURE — 51701 INSERT BLADDER CATHETER: CPT

## 2024-10-30 PROCEDURE — 94640 AIRWAY INHALATION TREATMENT: CPT

## 2024-10-30 PROCEDURE — 6360000002 HC RX W HCPCS: Performed by: NURSE PRACTITIONER

## 2024-10-30 PROCEDURE — 36415 COLL VENOUS BLD VENIPUNCTURE: CPT

## 2024-10-30 PROCEDURE — 97535 SELF CARE MNGMENT TRAINING: CPT

## 2024-10-30 RX ADMIN — OXYCODONE 5 MG: 5 TABLET ORAL at 06:38

## 2024-10-30 RX ADMIN — ATORVASTATIN CALCIUM 40 MG: 40 TABLET, FILM COATED ORAL at 08:46

## 2024-10-30 RX ADMIN — ACETAMINOPHEN 650 MG: 325 TABLET ORAL at 13:09

## 2024-10-30 RX ADMIN — ACETAMINOPHEN 650 MG: 325 TABLET ORAL at 00:35

## 2024-10-30 RX ADMIN — IPRATROPIUM BROMIDE 0.5 MG: 0.5 SOLUTION RESPIRATORY (INHALATION) at 00:29

## 2024-10-30 RX ADMIN — METOPROLOL SUCCINATE 50 MG: 50 TABLET, EXTENDED RELEASE ORAL at 08:46

## 2024-10-30 RX ADMIN — FUROSEMIDE 40 MG: 40 TABLET ORAL at 08:46

## 2024-10-30 RX ADMIN — TAMSULOSIN HYDROCHLORIDE 0.4 MG: 0.4 CAPSULE ORAL at 08:45

## 2024-10-30 RX ADMIN — OXYCODONE 10 MG: 5 TABLET ORAL at 10:42

## 2024-10-30 RX ADMIN — IPRATROPIUM BROMIDE 0.5 MG: 0.5 SOLUTION RESPIRATORY (INHALATION) at 09:24

## 2024-10-30 RX ADMIN — OXYCODONE 5 MG: 5 TABLET ORAL at 00:36

## 2024-10-30 RX ADMIN — SODIUM CHLORIDE, PRESERVATIVE FREE 10 ML: 5 INJECTION INTRAVENOUS at 08:47

## 2024-10-30 RX ADMIN — IPRATROPIUM BROMIDE 0.5 MG: 0.5 SOLUTION RESPIRATORY (INHALATION) at 13:01

## 2024-10-30 RX ADMIN — OXYCODONE 10 MG: 5 TABLET ORAL at 14:47

## 2024-10-30 RX ADMIN — CEFAZOLIN 2000 MG: 2 INJECTION, POWDER, FOR SOLUTION INTRAMUSCULAR; INTRAVENOUS at 00:35

## 2024-10-30 RX ADMIN — ACETAMINOPHEN 650 MG: 325 TABLET ORAL at 06:36

## 2024-10-30 RX ADMIN — APIXABAN 2.5 MG: 2.5 TABLET, FILM COATED ORAL at 08:46

## 2024-10-30 RX ADMIN — AMIODARONE HYDROCHLORIDE 200 MG: 200 TABLET ORAL at 08:46

## 2024-10-30 ASSESSMENT — PAIN DESCRIPTION - DESCRIPTORS
DESCRIPTORS: ACHING;DISCOMFORT;SORE
DESCRIPTORS: ACHING;DISCOMFORT;SORE
DESCRIPTORS: ACHING
DESCRIPTORS: ACHING

## 2024-10-30 ASSESSMENT — PAIN DESCRIPTION - LOCATION
LOCATION: KNEE

## 2024-10-30 ASSESSMENT — PAIN SCALES - GENERAL
PAINLEVEL_OUTOF10: 4
PAINLEVEL_OUTOF10: 7
PAINLEVEL_OUTOF10: 2
PAINLEVEL_OUTOF10: 4
PAINLEVEL_OUTOF10: 7
PAINLEVEL_OUTOF10: 2

## 2024-10-30 ASSESSMENT — PAIN DESCRIPTION - ORIENTATION
ORIENTATION: LEFT

## 2024-10-30 NOTE — CARE COORDINATION
Updated plan of care. POD#1 left TKA. Has DME. Plan is home with [facility]-orders completed. Plan for discharge today-[facility]

## 2024-10-30 NOTE — PLAN OF CARE
Problem: Discharge Planning  Goal: Discharge to home or other facility with appropriate resources  10/30/2024 1249 by Betsy Boudreaux RN  Outcome: Progressing     Problem: Pain  Goal: Verbalizes/displays adequate comfort level or baseline comfort level  10/30/2024 1249 by Betsy Boudreaux RN  Outcome: Progressing     Problem: Chronic Conditions and Co-morbidities  Goal: Patient's chronic conditions and co-morbidity symptoms are monitored and maintained or improved  10/30/2024 1249 by Betsy Boudreaux RN  Outcome: Progressing     Problem: ABCDS Injury Assessment  Goal: Absence of physical injury  10/30/2024 1249 by Betsy Boudreaux RN  Outcome: Progressing     Problem: Safety - Adult  Goal: Free from fall injury  10/30/2024 1249 by Betsy Boudreaux, RN  Outcome: Progressing

## 2024-10-30 NOTE — PROGRESS NOTES
Occupational Therapy  OT BEDSIDE TREATMENT NOTE      Date:10/30/2024  Patient Name: Mason Gudino  MRN: [id number]  : 1945  Room: [address] [place] [address]     Evaluating OT: Monica Saini OTR/L - [id number]     Referring Provider: Clayton Ervin APRN - CNP 10  Specific Provider Orders/Date: \"OT eval and treat\" - 10/29/2024     Diagnosis: Osteoarthritis of left knee [M17.12]  Status post left knee replacement [Z96.652]    Surgery: Patient underwent L TKA on 10/29/2024.  Pertinent Medical History: aortic stenosis, CAD, DJD, HLD, HTN, R TKA     Precautions: fall risk, WBAT LLE     Assessment of Current Deficits:    [x] Functional mobility             [x]ADLs           [x] Strength                  []Cognition   [x] Functional transfers           [x] IADLs         [x] Safety Awareness   [x]Endurance   [] Fine Coordination              [x] Balance      [] Vision/perception   []Sensation     []Gross Motor Coordination  [] ROM           [] Delirium                   [] Motor Control      OT PLAN OF CARE   OT POC is based on physician orders, patient diagnosis, and results of clinical assessment.  Frequency/Duration 2-5 days/week for 2 weeks PRN   Specific OT Treatment Interventions to Include:   * Instruction/training on adapted ADL techniques and AE recommendations to increase functional independence within precautions       * Training on energy conservation strategies, correct breathing pattern and techniques to improve independence/tolerance for self-care routine  * Functional transfer/mobility training/DME recommendations for increased independence, safety, and fall prevention  * Patient/Family education to increase follow through with safety techniques and functional independence  * Recommendation of environmental modifications for increased safety with functional transfers/mobility and ADLs  * Therapeutic exercise to improve motor endurance, ROM, and functional strength for ADLs/functional transfers  * Therapeutic
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
## 2024-10-30 NOTE — PROGRESS NOTES
CLINICAL PHARMACY NOTE: MEDS TO BEDS    Total # of Prescriptions Filled: 1   The following medications were delivered to the patient:  Percocet 5/ 325 mg    Additional Documentation:'

## 2024-10-30 NOTE — PROGRESS NOTES
Department of Orthopedic Surgery  Resident Progress Note    Patient seen and examined.  POD #1, left TKA.  Doing well, pain is well-controlled and he got up and walked with physical therapy yesterday.  Denies any nausea or vomiting, denies any other complaints    VITALS:  /67   Pulse 68   Temp 97.5 °F (36.4 °C) (Oral)   Resp 18   Ht 1.803 m (5' 11\")   Wt 116.1 kg (256 lb)   SpO2 100%   BMI 35.70 kg/m²     General: alert and oriented to person, place and time, well-developed and well-nourished, in no acute distress    MUSCULOSKELETAL:   left lower extremity:  Dressing C/D/I  Compartments soft and compressible  +PF/DF/EHL  +2/4 DP & PT pulses, Brisk Cap refill, Toes warm and perfused  Distal sensation grossly intact to Peroneals, Sural, Saphenous, and tibial nrs    CBC:   Lab Results   Component Value Date/Time    WBC 7.2 10/21/2024 10:59 AM    HGB 12.8 10/21/2024 10:59 AM    HCT 38.9 10/21/2024 10:59 AM     10/21/2024 10:59 AM     PT/INR:    Lab Results   Component Value Date/Time    PROTIME 16.2 02/05/2024 09:54 AM    INR 1.5 02/05/2024 09:54 AM         ASSESSMENT  S/P left TKA, POD #1    PLAN      Continue physical therapy and protocol: WBAT -left LE  24 hour abx coverage  Deep venous thrombosis prophylaxis -continue home Eliquis, early mobilization  PT/OT  Pain Control: IV and PO  D/C Plan: Home today    Electronically signed by Logan Landon DO on 10/30/2024 at 6:32 AM

## 2024-10-30 NOTE — PLAN OF CARE
Problem: Discharge Planning  Goal: Discharge to home or other facility with appropriate resources  10/30/2024 0459 by Maura Saldaña, RN  Outcome: Progressing     Problem: Pain  Goal: Verbalizes/displays adequate comfort level or baseline comfort level  10/30/2024 0459 by Maura Saldaña, RN  Outcome: Progressing     Problem: Chronic Conditions and Co-morbidities  Goal: Patient's chronic conditions and co-morbidity symptoms are monitored and maintained or improved  10/30/2024 0459 by Maura Saldaña, RN  Outcome: Progressing     Problem: ABCDS Injury Assessment  Goal: Absence of physical injury  10/30/2024 0459 by Maura Saldaña, RN  Outcome: Progressing     Problem: Safety - Adult  Goal: Free from fall injury  10/30/2024 0459 by Maura Saldaña, RN  Outcome: Progressing

## 2024-10-30 NOTE — PROGRESS NOTES
Physical Therapy  Facility/Department: [address] INTERMDIATE MED SURG  Daily Treatment Note  NAME: Mason Gudino  : 1945  MRN: [id number]    Date of Service: 10/30/2024    Patient Diagnosis(es): The primary encounter diagnosis was Pre-op testing. Diagnoses of Osteoarthritis of left knee and Status post left knee replacement were also pertinent to this visit.      Referring provider:  Lázaro Blackburn MD     PT Order:  PT eval and treat      Evaluating PT:  Sarah Borges PT, DPT PT [id number]     Room #:  0732/0732-A  Diagnosis:  Osteoarthritis of left knee [M17.12]  Status post left knee replacement [Z96.652]  Procedure/Surgery:  left TKA  Precautions:  Fall risk, WBAT left LE  Equipment Needs:  none.  Pt reported owing a walker.     SUBJECTIVE:     Pt lives with his daughter (not there all the time) in a 1 story home with 1+1 stairs to enter and 0 rail.  Bed and bath is on first floor.  Pt ambulated with SPC PTA.  Family will be assisting at discharge.     OBJECTIVE:    Initial Evaluation  Date: 10/29 Treatment  10/30 Short Term/ Long Term   Goals   Was pt agreeable to Eval/treatment? yes  yes     Does pt have pain? None reported  2/10 left knee     Bed Mobility  Rolling: NT  Supine to sit: NT  Sit to supine: NT  Scooting: NT  rolling:  independent  Supine to sit:  supervision  Sit to supine: NT  Scooting:  independent to sitting EOB supervision   Transfers Sit to stand: CGA  Stand to sit: CGA  Stand pivot: NT  sit to stand:  SBA  Stand to sit:  SBA  Stand pivot:  NT supervision   Ambulation    180 feet with w/w CGA   100 feet x 2 with w/w + feet with w/w supervision    Stair negotiation: ascended and descended  NT   4 steps with 2 rails SBA    1 platform step with w/w SBA 4 steps with 2 rail SBA     1 platform with with w/w SBA   ROM Right LE:  WFL  Left LE: hip and ankle WFL, knee 0-90 degrees   maintain knee ROM 0 - 90 degrees   Strength Right LE:  grossly 3+/5  Left LE:  grossly 4/5   Increase LE

## 2024-10-30 NOTE — PLAN OF CARE
Problem: Discharge Planning  Goal: Discharge to home or other facility with appropriate resources  10/30/2024 1454 by Betsy Boudreaux RN  Outcome: Adequate for Discharge     Problem: Pain  Goal: Verbalizes/displays adequate comfort level or baseline comfort level  10/30/2024 1454 by Betsy Boudreaux RN  Outcome: Adequate for Discharge     Problem: Chronic Conditions and Co-morbidities  Goal: Patient's chronic conditions and co-morbidity symptoms are monitored and maintained or improved  10/30/2024 1454 by Betsy Boudreaux RN  Outcome: Adequate for Discharge     Problem: ABCDS Injury Assessment  Goal: Absence of physical injury  10/30/2024 1454 by Betsy Boudreaux RN  Outcome: Adequate for Discharge

## 2024-10-31 ENCOUNTER — CARE COORDINATION (OUTPATIENT)
Dept: CARE COORDINATION | Age: 79
End: 2024-10-31

## 2024-10-31 ENCOUNTER — TELEPHONE (OUTPATIENT)
Dept: PHARMACY | Facility: CLINIC | Age: 79
End: 2024-10-31

## 2024-10-31 ENCOUNTER — HOSPITAL ENCOUNTER (INPATIENT)
Age: 79
LOS: 7 days | Discharge: NURSING, REHAB OR LONG-TERM CARE | End: 2024-11-07
Attending: EMERGENCY MEDICINE | Admitting: FAMILY MEDICINE
Payer: MEDICARE

## 2024-10-31 ENCOUNTER — APPOINTMENT (OUTPATIENT)
Dept: GENERAL RADIOLOGY | Age: 79
End: 2024-10-31
Payer: MEDICARE

## 2024-10-31 ENCOUNTER — TELEPHONE (OUTPATIENT)
Dept: FAMILY MEDICINE CLINIC | Age: 79
End: 2024-10-31

## 2024-10-31 DIAGNOSIS — M79.89 LEFT ARM SWELLING: ICD-10-CM

## 2024-10-31 DIAGNOSIS — Z96.652 STATUS POST LEFT KNEE REPLACEMENT: Primary | ICD-10-CM

## 2024-10-31 DIAGNOSIS — N17.9 AKI (ACUTE KIDNEY INJURY) (HCC): ICD-10-CM

## 2024-10-31 DIAGNOSIS — Z96.652 HISTORY OF TOTAL LEFT KNEE REPLACEMENT: ICD-10-CM

## 2024-10-31 DIAGNOSIS — R26.2 DIFFICULTY IN WALKING: Primary | ICD-10-CM

## 2024-10-31 DIAGNOSIS — Z96.652 STATUS POST LEFT KNEE REPLACEMENT: ICD-10-CM

## 2024-10-31 LAB
ANION GAP SERPL CALCULATED.3IONS-SCNC: 9 MMOL/L (ref 7–16)
BASOPHILS # BLD: 0 K/UL (ref 0–0.2)
BASOPHILS NFR BLD: 0 % (ref 0–2)
BUN SERPL-MCNC: 25 MG/DL (ref 6–23)
CALCIUM SERPL-MCNC: 8.5 MG/DL (ref 8.6–10.2)
CHLORIDE SERPL-SCNC: 100 MMOL/L (ref 98–107)
CO2 SERPL-SCNC: 26 MMOL/L (ref 22–29)
CREAT SERPL-MCNC: 1.5 MG/DL (ref 0.7–1.2)
EOSINOPHIL # BLD: 0.1 K/UL (ref 0.05–0.5)
EOSINOPHILS RELATIVE PERCENT: 1 % (ref 0–6)
ERYTHROCYTE [DISTWIDTH] IN BLOOD BY AUTOMATED COUNT: 12.8 % (ref 11.5–15)
GFR, ESTIMATED: 46 ML/MIN/1.73M2
GLUCOSE BLD-MCNC: 240 MG/DL (ref 74–99)
GLUCOSE SERPL-MCNC: 228 MG/DL (ref 74–99)
HCT VFR BLD AUTO: 31.4 % (ref 37–54)
HGB BLD-MCNC: 10.4 G/DL (ref 12.5–16.5)
LYMPHOCYTES NFR BLD: 1.16 K/UL (ref 1.5–4)
LYMPHOCYTES RELATIVE PERCENT: 11 % (ref 20–42)
MCH RBC QN AUTO: 31.9 PG (ref 26–35)
MCHC RBC AUTO-ENTMCNC: 33.1 G/DL (ref 32–34.5)
MCV RBC AUTO: 96.3 FL (ref 80–99.9)
MONOCYTES NFR BLD: 0.58 K/UL (ref 0.1–0.95)
MONOCYTES NFR BLD: 5 % (ref 2–12)
NEUTROPHILS NFR BLD: 83 % (ref 43–80)
NEUTS SEG NFR BLD: 9.07 K/UL (ref 1.8–7.3)
PLATELET # BLD AUTO: 196 K/UL (ref 130–450)
PMV BLD AUTO: 10.4 FL (ref 7–12)
POTASSIUM SERPL-SCNC: 4.7 MMOL/L (ref 3.5–5)
RBC # BLD AUTO: 3.26 M/UL (ref 3.8–5.8)
RBC # BLD: ABNORMAL 10*6/UL
SODIUM SERPL-SCNC: 135 MMOL/L (ref 132–146)
WBC OTHER # BLD: 10.9 K/UL (ref 4.5–11.5)

## 2024-10-31 PROCEDURE — 85025 COMPLETE CBC W/AUTO DIFF WBC: CPT

## 2024-10-31 PROCEDURE — 73562 X-RAY EXAM OF KNEE 3: CPT

## 2024-10-31 PROCEDURE — 6360000002 HC RX W HCPCS

## 2024-10-31 PROCEDURE — 99285 EMERGENCY DEPT VISIT HI MDM: CPT

## 2024-10-31 PROCEDURE — 2580000003 HC RX 258

## 2024-10-31 PROCEDURE — 94664 DEMO&/EVAL PT USE INHALER: CPT

## 2024-10-31 PROCEDURE — 1200000000 HC SEMI PRIVATE

## 2024-10-31 PROCEDURE — 6370000000 HC RX 637 (ALT 250 FOR IP): Performed by: EMERGENCY MEDICINE

## 2024-10-31 PROCEDURE — 82962 GLUCOSE BLOOD TEST: CPT

## 2024-10-31 PROCEDURE — 6370000000 HC RX 637 (ALT 250 FOR IP)

## 2024-10-31 PROCEDURE — 80048 BASIC METABOLIC PNL TOTAL CA: CPT

## 2024-10-31 RX ORDER — OXYCODONE AND ACETAMINOPHEN 5; 325 MG/1; MG/1
1 TABLET ORAL EVERY 6 HOURS PRN
Status: DISCONTINUED | OUTPATIENT
Start: 2024-10-31 | End: 2024-10-31

## 2024-10-31 RX ORDER — ARFORMOTEROL TARTRATE 15 UG/2ML
15 SOLUTION RESPIRATORY (INHALATION)
Status: DISCONTINUED | OUTPATIENT
Start: 2024-10-31 | End: 2024-11-07 | Stop reason: HOSPADM

## 2024-10-31 RX ORDER — ACETAMINOPHEN 650 MG/1
650 SUPPOSITORY RECTAL EVERY 6 HOURS PRN
Status: DISCONTINUED | OUTPATIENT
Start: 2024-10-31 | End: 2024-11-07 | Stop reason: HOSPADM

## 2024-10-31 RX ORDER — DONEPEZIL HYDROCHLORIDE 10 MG/1
10 TABLET, FILM COATED ORAL NIGHTLY
Status: DISCONTINUED | OUTPATIENT
Start: 2024-10-31 | End: 2024-11-07 | Stop reason: HOSPADM

## 2024-10-31 RX ORDER — GLUCAGON 1 MG/ML
1 KIT INJECTION PRN
Status: DISCONTINUED | OUTPATIENT
Start: 2024-10-31 | End: 2024-11-07 | Stop reason: HOSPADM

## 2024-10-31 RX ORDER — DEXTROSE MONOHYDRATE 100 MG/ML
INJECTION, SOLUTION INTRAVENOUS CONTINUOUS PRN
Status: DISCONTINUED | OUTPATIENT
Start: 2024-10-31 | End: 2024-11-07 | Stop reason: HOSPADM

## 2024-10-31 RX ORDER — OXYCODONE AND ACETAMINOPHEN 5; 325 MG/1; MG/1
1 TABLET ORAL ONCE
Status: COMPLETED | OUTPATIENT
Start: 2024-10-31 | End: 2024-10-31

## 2024-10-31 RX ORDER — SODIUM CHLORIDE 0.9 % (FLUSH) 0.9 %
5-40 SYRINGE (ML) INJECTION PRN
Status: DISCONTINUED | OUTPATIENT
Start: 2024-10-31 | End: 2024-11-07 | Stop reason: HOSPADM

## 2024-10-31 RX ORDER — SODIUM CHLORIDE 0.9 % (FLUSH) 0.9 %
5-40 SYRINGE (ML) INJECTION EVERY 12 HOURS SCHEDULED
Status: DISCONTINUED | OUTPATIENT
Start: 2024-10-31 | End: 2024-11-07 | Stop reason: HOSPADM

## 2024-10-31 RX ORDER — ONDANSETRON 4 MG/1
4 TABLET, ORALLY DISINTEGRATING ORAL EVERY 8 HOURS PRN
Status: DISCONTINUED | OUTPATIENT
Start: 2024-10-31 | End: 2024-11-07 | Stop reason: HOSPADM

## 2024-10-31 RX ORDER — FUROSEMIDE 40 MG/1
40 TABLET ORAL DAILY
Status: DISCONTINUED | OUTPATIENT
Start: 2024-11-01 | End: 2024-11-05

## 2024-10-31 RX ORDER — TAMSULOSIN HYDROCHLORIDE 0.4 MG/1
0.4 CAPSULE ORAL EVERY MORNING
Status: DISCONTINUED | OUTPATIENT
Start: 2024-11-01 | End: 2024-11-07 | Stop reason: HOSPADM

## 2024-10-31 RX ORDER — ONDANSETRON 2 MG/ML
4 INJECTION INTRAMUSCULAR; INTRAVENOUS EVERY 6 HOURS PRN
Status: DISCONTINUED | OUTPATIENT
Start: 2024-10-31 | End: 2024-11-07 | Stop reason: HOSPADM

## 2024-10-31 RX ORDER — POLYETHYLENE GLYCOL 3350 17 G/17G
17 POWDER, FOR SOLUTION ORAL DAILY
Status: DISCONTINUED | OUTPATIENT
Start: 2024-10-31 | End: 2024-11-04

## 2024-10-31 RX ORDER — AMIODARONE HYDROCHLORIDE 200 MG/1
200 TABLET ORAL EVERY MORNING
Status: DISCONTINUED | OUTPATIENT
Start: 2024-11-01 | End: 2024-11-07 | Stop reason: HOSPADM

## 2024-10-31 RX ORDER — ATORVASTATIN CALCIUM 40 MG/1
40 TABLET, FILM COATED ORAL DAILY
Status: DISCONTINUED | OUTPATIENT
Start: 2024-11-01 | End: 2024-11-07 | Stop reason: HOSPADM

## 2024-10-31 RX ORDER — METOPROLOL SUCCINATE 50 MG/1
50 TABLET, EXTENDED RELEASE ORAL EVERY MORNING
Status: DISCONTINUED | OUTPATIENT
Start: 2024-11-01 | End: 2024-11-07

## 2024-10-31 RX ORDER — INSULIN LISPRO 100 [IU]/ML
0-4 INJECTION, SOLUTION INTRAVENOUS; SUBCUTANEOUS
Status: DISCONTINUED | OUTPATIENT
Start: 2024-10-31 | End: 2024-11-02

## 2024-10-31 RX ORDER — ALBUTEROL SULFATE 0.83 MG/ML
2.5 SOLUTION RESPIRATORY (INHALATION) EVERY 6 HOURS PRN
Status: DISCONTINUED | OUTPATIENT
Start: 2024-10-31 | End: 2024-11-05

## 2024-10-31 RX ORDER — SODIUM CHLORIDE 9 MG/ML
INJECTION, SOLUTION INTRAVENOUS PRN
Status: DISCONTINUED | OUTPATIENT
Start: 2024-10-31 | End: 2024-11-07 | Stop reason: HOSPADM

## 2024-10-31 RX ORDER — OXYCODONE AND ACETAMINOPHEN 5; 325 MG/1; MG/1
1 TABLET ORAL EVERY 6 HOURS PRN
Status: DISCONTINUED | OUTPATIENT
Start: 2024-11-01 | End: 2024-11-07 | Stop reason: HOSPADM

## 2024-10-31 RX ORDER — SENNA AND DOCUSATE SODIUM 50; 8.6 MG/1; MG/1
2 TABLET, FILM COATED ORAL DAILY
Status: DISCONTINUED | OUTPATIENT
Start: 2024-10-31 | End: 2024-11-04

## 2024-10-31 RX ORDER — ACETAMINOPHEN 325 MG/1
650 TABLET ORAL EVERY 6 HOURS PRN
Status: DISCONTINUED | OUTPATIENT
Start: 2024-10-31 | End: 2024-11-07 | Stop reason: HOSPADM

## 2024-10-31 RX ADMIN — ARFORMOTEROL TARTRATE 15 MCG: 15 SOLUTION RESPIRATORY (INHALATION) at 20:44

## 2024-10-31 RX ADMIN — OXYCODONE HYDROCHLORIDE AND ACETAMINOPHEN 1 TABLET: 5; 325 TABLET ORAL at 18:34

## 2024-10-31 RX ADMIN — INSULIN LISPRO 1 UNITS: 100 INJECTION, SOLUTION INTRAVENOUS; SUBCUTANEOUS at 22:12

## 2024-10-31 RX ADMIN — APIXABAN 2.5 MG: 2.5 TABLET, FILM COATED ORAL at 22:12

## 2024-10-31 RX ADMIN — POLYETHYLENE GLYCOL 3350 17 G: 17 POWDER, FOR SOLUTION ORAL at 22:12

## 2024-10-31 RX ADMIN — DONEPEZIL HYDROCHLORIDE 10 MG: 10 TABLET, FILM COATED ORAL at 22:12

## 2024-10-31 RX ADMIN — SODIUM CHLORIDE, PRESERVATIVE FREE 10 ML: 5 INJECTION INTRAVENOUS at 22:21

## 2024-10-31 RX ADMIN — DOCUSATE SODIUM 50 MG AND SENNOSIDES 8.6 MG 2 TABLET: 8.6; 5 TABLET, FILM COATED ORAL at 22:12

## 2024-10-31 ASSESSMENT — LIFESTYLE VARIABLES
HOW MANY STANDARD DRINKS CONTAINING ALCOHOL DO YOU HAVE ON A TYPICAL DAY: PATIENT DOES NOT DRINK
HOW OFTEN DO YOU HAVE A DRINK CONTAINING ALCOHOL: NEVER

## 2024-10-31 ASSESSMENT — PAIN - FUNCTIONAL ASSESSMENT
PAIN_FUNCTIONAL_ASSESSMENT: NONE - DENIES PAIN
PAIN_FUNCTIONAL_ASSESSMENT: NONE - DENIES PAIN

## 2024-10-31 ASSESSMENT — PAIN SCALES - GENERAL: PAINLEVEL_OUTOF10: 8

## 2024-10-31 NOTE — CARE COORDINATION
Mason contacted SW to report that PT assessed patient and determined he would need level of PT. Mason continued to express concerns about patient's falls and urination. He stated that patient has an urology appointment tomorrow however was not comfortable transporting patient in the car. He stated that patient had to change his pants six times. Suggested contacting PCP for recommendation. Informed patient that it may be best to address any medical concerns before attempting admission into rehab. Explained that he can be admitted to rehab from hospital.     Mason called back later to report that ambulance was on the way for patient.
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
## 2024-10-31 NOTE — CARE COORDINATION
Call to son Mason to initiate SW referral for assistance with rehab admission. Mason reported that patient has falling twice since being discharged yesterday and is having difficulty urinating. Mason expressed concern and inquired about rehab admission. He indicated that patient was walking better at the hospital. PT will be out today therefore Mason wants to obtain their recommendation on patient's falls. SW suggested returning to hospital if he believes his condition has worsened. Also suggested contacting his PCP. Mason stated he will follow up with SW after the PT visit.

## 2024-10-31 NOTE — TELEPHONE ENCOUNTER
Kylie, patient's daughter in law, called with concerns of patient having 2 falls after knee replacement.  Patient's daughter is with him and called an ambulance to take him to the ED for evaluation.  They want him admitted due to the falls and concern for future injury.  Spoke to SW earlier today and expressed concerns.  Patient is also having frequent urination with minimal output.

## 2024-10-31 NOTE — CARE COORDINATION
falls and/or worsening weakness.  Use urinal to measure urine OP. Hydrate to keep urine lt-mod yellow. Report to urology any urine OP concerns, increasing edema, sob w/ congestion.  Take medications as directed.   Ortho office advised as per yellow box. Pending raised toilet seat.    Care Summary Note: CTN spoke w/ pts son/Mason. Son shares pt has fallen twice since discharged. States he helped pt slide to ground outside after getting home and getting out of car. States pt fell again in bedroom witnessed by family member. Denies any known injury or new pain complaints. Reports knee dressing is clean and w/out drainage shadowing. Required family hunting other persons to stop over and help get pt off ground/floor. States pt is very weak. States pt is \"very flustered and now fearful of falling\". States has been using walker but is requiring two people to support him and they can only manage having 1 person available for support. He was unable to get up off toilet w/ 1 person assist this morning. Requests raised toilet seat order. Family wants pt to go to skilled rehab for 1-2 wks and share [place] is close by.  referral routed as high priority. Family advised to call squad if pt falls again, v/u. [facility] to see today at Noon. Reports poor urine flow and taking time to eliminate in small amounts. Will measure OP. Has uro appt tomorrow. See yellow box.    Care Transition Nurse reviewed discharge instructions with family. The family was given an opportunity to ask questions; all questions answered at this time.. The family verbalized understanding.   Were discharge instructions available to patient? Yes.   Reviewed appropriate site of care based on symptoms and resources available to patient including: PCP  Specialist  Urgent care clinics  [facility]  When to call 911  Condition related references. The family agrees to contact the primary care provider and/or specialist office for questions related to their healthcare.

## 2024-10-31 NOTE — TELEPHONE ENCOUNTER
Taking to the ER is appropriate.    Await ER eval  Likely if admission to [facility] required our team will likely admit

## 2024-10-31 NOTE — PROGRESS NOTES
Database initiated pharmacy and medications verified with the patient. He is A&O comes in from home with daughter. He uses a walker and is RA at baseline. He had a left total knee yesterday went home and couldn't ambulate and he fell. He is hard of hearing.

## 2024-10-31 NOTE — TELEPHONE ENCOUNTER
CLINICAL PHARMACY NOTE - [facility] Pharmacy Referral    Patient can be scheduled with:  Team Schedule- General Referrals, etc.    Received a referral from: Care Coordinator to discuss patient’s medications. Called patient to schedule a time to speak with a pharmacist over the telephone.     Currently admitted will outreach after discharge    Thi Hdz CPhT.   [facility] Clinical   [facility]  Toll free: [phone] Option 1

## 2024-11-01 ENCOUNTER — APPOINTMENT (OUTPATIENT)
Dept: GENERAL RADIOLOGY | Age: 79
End: 2024-11-01
Payer: MEDICARE

## 2024-11-01 ENCOUNTER — APPOINTMENT (OUTPATIENT)
Dept: CT IMAGING | Age: 79
End: 2024-11-01
Payer: MEDICARE

## 2024-11-01 DIAGNOSIS — E11.9 TYPE 2 DIABETES MELLITUS WITHOUT COMPLICATION, WITHOUT LONG-TERM CURRENT USE OF INSULIN (HCC): ICD-10-CM

## 2024-11-01 PROBLEM — E11.65 UNCONTROLLED TYPE 2 DIABETES MELLITUS WITH HYPERGLYCEMIA (HCC): Status: ACTIVE | Noted: 2024-11-01

## 2024-11-01 PROBLEM — M25.469 KNEE SWELLING: Status: ACTIVE | Noted: 2024-11-01

## 2024-11-01 PROBLEM — R26.2 DIFFICULTY IN WALKING: Status: ACTIVE | Noted: 2024-11-01

## 2024-11-01 PROBLEM — R65.10 SIRS (SYSTEMIC INFLAMMATORY RESPONSE SYNDROME) (HCC): Status: ACTIVE | Noted: 2024-11-01

## 2024-11-01 LAB
ALBUMIN SERPL-MCNC: 2.9 G/DL (ref 3.5–5.2)
ALBUMIN SERPL-MCNC: 3 G/DL (ref 3.5–5.2)
ALP SERPL-CCNC: 69 U/L (ref 40–129)
ALP SERPL-CCNC: 75 U/L (ref 40–129)
ALT SERPL-CCNC: 7 U/L (ref 0–40)
ALT SERPL-CCNC: 8 U/L (ref 0–40)
ANION GAP SERPL CALCULATED.3IONS-SCNC: 10 MMOL/L (ref 7–16)
ANION GAP SERPL CALCULATED.3IONS-SCNC: 11 MMOL/L (ref 7–16)
AST SERPL-CCNC: 15 U/L (ref 0–39)
AST SERPL-CCNC: 19 U/L (ref 0–39)
B PARAP IS1001 DNA NPH QL NAA+NON-PROBE: NOT DETECTED
B PERT DNA SPEC QL NAA+PROBE: NOT DETECTED
BASOPHILS # BLD: 0.02 K/UL (ref 0–0.2)
BASOPHILS # BLD: 0.02 K/UL (ref 0–0.2)
BASOPHILS NFR BLD: 0 % (ref 0–2)
BASOPHILS NFR BLD: 0 % (ref 0–2)
BILIRUB SERPL-MCNC: 0.5 MG/DL (ref 0–1.2)
BILIRUB SERPL-MCNC: 0.5 MG/DL (ref 0–1.2)
BILIRUB UR QL STRIP: NEGATIVE
BUN SERPL-MCNC: 26 MG/DL (ref 6–23)
BUN SERPL-MCNC: 26 MG/DL (ref 6–23)
C PNEUM DNA NPH QL NAA+NON-PROBE: NOT DETECTED
CALCIUM SERPL-MCNC: 8.7 MG/DL (ref 8.6–10.2)
CALCIUM SERPL-MCNC: 8.9 MG/DL (ref 8.6–10.2)
CHLORIDE SERPL-SCNC: 101 MMOL/L (ref 98–107)
CHLORIDE SERPL-SCNC: 101 MMOL/L (ref 98–107)
CLARITY UR: CLEAR
CO2 SERPL-SCNC: 23 MMOL/L (ref 22–29)
CO2 SERPL-SCNC: 24 MMOL/L (ref 22–29)
COLOR UR: YELLOW
CREAT SERPL-MCNC: 1.4 MG/DL (ref 0.7–1.2)
CREAT SERPL-MCNC: 1.4 MG/DL (ref 0.7–1.2)
CRP SERPL HS-MCNC: 161 MG/L (ref 0–5)
EKG ATRIAL RATE: 76 BPM
EKG P AXIS: 55 DEGREES
EKG P-R INTERVAL: 174 MS
EKG Q-T INTERVAL: 414 MS
EKG QRS DURATION: 120 MS
EKG QTC CALCULATION (BAZETT): 465 MS
EKG R AXIS: -36 DEGREES
EKG T AXIS: 46 DEGREES
EKG VENTRICULAR RATE: 76 BPM
EOSINOPHIL # BLD: 0.12 K/UL (ref 0.05–0.5)
EOSINOPHIL # BLD: 0.16 K/UL (ref 0.05–0.5)
EOSINOPHILS RELATIVE PERCENT: 2 % (ref 0–6)
EOSINOPHILS RELATIVE PERCENT: 2 % (ref 0–6)
ERYTHROCYTE [DISTWIDTH] IN BLOOD BY AUTOMATED COUNT: 12.6 % (ref 11.5–15)
ERYTHROCYTE [DISTWIDTH] IN BLOOD BY AUTOMATED COUNT: 12.8 % (ref 11.5–15)
ERYTHROCYTE [SEDIMENTATION RATE] IN BLOOD BY WESTERGREN METHOD: 53 MM/HR (ref 0–15)
FLUAV RNA NPH QL NAA+NON-PROBE: NOT DETECTED
FLUBV RNA NPH QL NAA+NON-PROBE: NOT DETECTED
GFR, ESTIMATED: 50 ML/MIN/1.73M2
GFR, ESTIMATED: 53 ML/MIN/1.73M2
GLUCOSE BLD-MCNC: 180 MG/DL (ref 74–99)
GLUCOSE BLD-MCNC: 212 MG/DL (ref 74–99)
GLUCOSE BLD-MCNC: 243 MG/DL (ref 74–99)
GLUCOSE BLD-MCNC: 267 MG/DL (ref 74–99)
GLUCOSE BLD-MCNC: 288 MG/DL (ref 74–99)
GLUCOSE SERPL-MCNC: 186 MG/DL (ref 74–99)
GLUCOSE SERPL-MCNC: 188 MG/DL (ref 74–99)
GLUCOSE UR STRIP-MCNC: NEGATIVE MG/DL
HADV DNA NPH QL NAA+NON-PROBE: NOT DETECTED
HCOV 229E RNA NPH QL NAA+NON-PROBE: NOT DETECTED
HCOV HKU1 RNA NPH QL NAA+NON-PROBE: NOT DETECTED
HCOV NL63 RNA NPH QL NAA+NON-PROBE: NOT DETECTED
HCOV OC43 RNA NPH QL NAA+NON-PROBE: NOT DETECTED
HCT VFR BLD AUTO: 30.3 % (ref 37–54)
HCT VFR BLD AUTO: 33 % (ref 37–54)
HGB BLD-MCNC: 10.2 G/DL (ref 12.5–16.5)
HGB BLD-MCNC: 11 G/DL (ref 12.5–16.5)
HGB UR QL STRIP.AUTO: ABNORMAL
HMPV RNA NPH QL NAA+NON-PROBE: NOT DETECTED
HPIV1 RNA NPH QL NAA+NON-PROBE: NOT DETECTED
HPIV2 RNA NPH QL NAA+NON-PROBE: NOT DETECTED
HPIV3 RNA NPH QL NAA+NON-PROBE: NOT DETECTED
HPIV4 RNA NPH QL NAA+NON-PROBE: NOT DETECTED
IMM GRANULOCYTES # BLD AUTO: 0.11 K/UL (ref 0–0.58)
IMM GRANULOCYTES # BLD AUTO: 0.12 K/UL (ref 0–0.58)
IMM GRANULOCYTES NFR BLD: 1 % (ref 0–5)
IMM GRANULOCYTES NFR BLD: 2 % (ref 0–5)
KETONES UR STRIP-MCNC: NEGATIVE MG/DL
LACTATE BLDV-SCNC: 1.2 MMOL/L (ref 0.5–1.9)
LACTATE BLDV-SCNC: 1.2 MMOL/L (ref 0.5–1.9)
LEUKOCYTE ESTERASE UR QL STRIP: NEGATIVE
LYMPHOCYTES NFR BLD: 1.29 K/UL (ref 1.5–4)
LYMPHOCYTES NFR BLD: 1.54 K/UL (ref 1.5–4)
LYMPHOCYTES RELATIVE PERCENT: 16 % (ref 20–42)
LYMPHOCYTES RELATIVE PERCENT: 20 % (ref 20–42)
M PNEUMO DNA NPH QL NAA+NON-PROBE: NOT DETECTED
MAGNESIUM SERPL-MCNC: 2 MG/DL (ref 1.6–2.6)
MCH RBC QN AUTO: 31.9 PG (ref 26–35)
MCH RBC QN AUTO: 32 PG (ref 26–35)
MCHC RBC AUTO-ENTMCNC: 33.3 G/DL (ref 32–34.5)
MCHC RBC AUTO-ENTMCNC: 33.7 G/DL (ref 32–34.5)
MCV RBC AUTO: 95 FL (ref 80–99.9)
MCV RBC AUTO: 95.7 FL (ref 80–99.9)
MONOCYTES NFR BLD: 0.2 K/UL (ref 0.1–0.95)
MONOCYTES NFR BLD: 0.26 K/UL (ref 0.1–0.95)
MONOCYTES NFR BLD: 3 % (ref 2–12)
MONOCYTES NFR BLD: 3 % (ref 2–12)
MUCOUS THREADS URNS QL MICRO: PRESENT
NEUTROPHILS NFR BLD: 73 % (ref 43–80)
NEUTROPHILS NFR BLD: 78 % (ref 43–80)
NEUTS SEG NFR BLD: 5.58 K/UL (ref 1.8–7.3)
NEUTS SEG NFR BLD: 6.25 K/UL (ref 1.8–7.3)
NITRITE UR QL STRIP: NEGATIVE
PH UR STRIP: 5.5 [PH] (ref 5–9)
PHOSPHATE SERPL-MCNC: 2.8 MG/DL (ref 2.5–4.5)
PLATELET # BLD AUTO: 186 K/UL (ref 130–450)
PLATELET # BLD AUTO: 190 K/UL (ref 130–450)
PMV BLD AUTO: 10 FL (ref 7–12)
PMV BLD AUTO: 10.2 FL (ref 7–12)
POTASSIUM SERPL-SCNC: 4 MMOL/L (ref 3.5–5)
POTASSIUM SERPL-SCNC: 4.4 MMOL/L (ref 3.5–5)
PROT SERPL-MCNC: 5.6 G/DL (ref 6.4–8.3)
PROT SERPL-MCNC: 6 G/DL (ref 6.4–8.3)
PROT UR STRIP-MCNC: 30 MG/DL
RBC # BLD AUTO: 3.19 M/UL (ref 3.8–5.8)
RBC # BLD AUTO: 3.45 M/UL (ref 3.8–5.8)
RBC #/AREA URNS HPF: ABNORMAL /HPF
RSV RNA NPH QL NAA+NON-PROBE: NOT DETECTED
RV+EV RNA NPH QL NAA+NON-PROBE: NOT DETECTED
SARS-COV-2 RNA NPH QL NAA+NON-PROBE: NOT DETECTED
SODIUM SERPL-SCNC: 135 MMOL/L (ref 132–146)
SODIUM SERPL-SCNC: 135 MMOL/L (ref 132–146)
SP GR UR STRIP: 1.01 (ref 1–1.03)
SPECIMEN DESCRIPTION: NORMAL
SURGICAL PATHOLOGY REPORT: NORMAL
UROBILINOGEN UR STRIP-ACNC: 0.2 EU/DL (ref 0–1)
WBC #/AREA URNS HPF: ABNORMAL /HPF
WBC OTHER # BLD: 7.6 K/UL (ref 4.5–11.5)
WBC OTHER # BLD: 8.1 K/UL (ref 4.5–11.5)

## 2024-11-01 PROCEDURE — 71275 CT ANGIOGRAPHY CHEST: CPT

## 2024-11-01 PROCEDURE — 6360000002 HC RX W HCPCS: Performed by: INTERNAL MEDICINE

## 2024-11-01 PROCEDURE — 6370000000 HC RX 637 (ALT 250 FOR IP)

## 2024-11-01 PROCEDURE — 6370000000 HC RX 637 (ALT 250 FOR IP): Performed by: FAMILY MEDICINE

## 2024-11-01 PROCEDURE — 85652 RBC SED RATE AUTOMATED: CPT

## 2024-11-01 PROCEDURE — 2580000003 HC RX 258: Performed by: INTERNAL MEDICINE

## 2024-11-01 PROCEDURE — 97165 OT EVAL LOW COMPLEX 30 MIN: CPT

## 2024-11-01 PROCEDURE — 94660 CPAP INITIATION&MGMT: CPT

## 2024-11-01 PROCEDURE — 83735 ASSAY OF MAGNESIUM: CPT

## 2024-11-01 PROCEDURE — 51798 US URINE CAPACITY MEASURE: CPT

## 2024-11-01 PROCEDURE — 0202U NFCT DS 22 TRGT SARS-COV-2: CPT

## 2024-11-01 PROCEDURE — 6360000002 HC RX W HCPCS

## 2024-11-01 PROCEDURE — 71045 X-RAY EXAM CHEST 1 VIEW: CPT

## 2024-11-01 PROCEDURE — 1200000000 HC SEMI PRIVATE

## 2024-11-01 PROCEDURE — 84100 ASSAY OF PHOSPHORUS: CPT

## 2024-11-01 PROCEDURE — 87081 CULTURE SCREEN ONLY: CPT

## 2024-11-01 PROCEDURE — 6360000004 HC RX CONTRAST MEDICATION: Performed by: RADIOLOGY

## 2024-11-01 PROCEDURE — 87040 BLOOD CULTURE FOR BACTERIA: CPT

## 2024-11-01 PROCEDURE — 93010 ELECTROCARDIOGRAM REPORT: CPT | Performed by: INTERNAL MEDICINE

## 2024-11-01 PROCEDURE — 83605 ASSAY OF LACTIC ACID: CPT

## 2024-11-01 PROCEDURE — 85025 COMPLETE CBC W/AUTO DIFF WBC: CPT

## 2024-11-01 PROCEDURE — 82962 GLUCOSE BLOOD TEST: CPT

## 2024-11-01 PROCEDURE — 93005 ELECTROCARDIOGRAM TRACING: CPT | Performed by: INTERNAL MEDICINE

## 2024-11-01 PROCEDURE — 2580000003 HC RX 258

## 2024-11-01 PROCEDURE — 81001 URINALYSIS AUTO W/SCOPE: CPT

## 2024-11-01 PROCEDURE — 94640 AIRWAY INHALATION TREATMENT: CPT

## 2024-11-01 PROCEDURE — 80053 COMPREHEN METABOLIC PANEL: CPT

## 2024-11-01 PROCEDURE — 36415 COLL VENOUS BLD VENIPUNCTURE: CPT

## 2024-11-01 PROCEDURE — 99291 CRITICAL CARE FIRST HOUR: CPT | Performed by: INTERNAL MEDICINE

## 2024-11-01 PROCEDURE — 99222 1ST HOSP IP/OBS MODERATE 55: CPT | Performed by: FAMILY MEDICINE

## 2024-11-01 PROCEDURE — 86140 C-REACTIVE PROTEIN: CPT

## 2024-11-01 RX ORDER — 0.9 % SODIUM CHLORIDE 0.9 %
500 INTRAVENOUS SOLUTION INTRAVENOUS ONCE
Status: COMPLETED | OUTPATIENT
Start: 2024-11-01 | End: 2024-11-01

## 2024-11-01 RX ORDER — MEPERIDINE HYDROCHLORIDE 25 MG/ML
12.5 INJECTION INTRAMUSCULAR; INTRAVENOUS; SUBCUTANEOUS ONCE
Status: COMPLETED | OUTPATIENT
Start: 2024-11-01 | End: 2024-11-01

## 2024-11-01 RX ORDER — SODIUM CHLORIDE 9 MG/ML
INJECTION, SOLUTION INTRAVENOUS CONTINUOUS
Status: DISCONTINUED | OUTPATIENT
Start: 2024-11-01 | End: 2024-11-02

## 2024-11-01 RX ORDER — IOPAMIDOL 755 MG/ML
75 INJECTION, SOLUTION INTRAVASCULAR
Status: COMPLETED | OUTPATIENT
Start: 2024-11-01 | End: 2024-11-01

## 2024-11-01 RX ORDER — MEPERIDINE HYDROCHLORIDE 25 MG/ML
INJECTION INTRAMUSCULAR; INTRAVENOUS; SUBCUTANEOUS
Status: COMPLETED | OUTPATIENT
Start: 2024-11-01 | End: 2024-11-01

## 2024-11-01 RX ADMIN — INSULIN LISPRO 2 UNITS: 100 INJECTION, SOLUTION INTRAVENOUS; SUBCUTANEOUS at 21:49

## 2024-11-01 RX ADMIN — PETROLATUM: 420 OINTMENT TOPICAL at 13:35

## 2024-11-01 RX ADMIN — IPRATROPIUM BROMIDE 0.5 MG: 0.5 SOLUTION RESPIRATORY (INHALATION) at 21:23

## 2024-11-01 RX ADMIN — IOPAMIDOL 75 ML: 755 INJECTION, SOLUTION INTRAVENOUS at 09:51

## 2024-11-01 RX ADMIN — VANCOMYCIN HYDROCHLORIDE 1500 MG: 10 INJECTION, POWDER, LYOPHILIZED, FOR SOLUTION INTRAVENOUS at 21:48

## 2024-11-01 RX ADMIN — IPRATROPIUM BROMIDE 0.5 MG: 0.5 SOLUTION RESPIRATORY (INHALATION) at 13:44

## 2024-11-01 RX ADMIN — ARFORMOTEROL TARTRATE 15 MCG: 15 SOLUTION RESPIRATORY (INHALATION) at 21:23

## 2024-11-01 RX ADMIN — CEFEPIME 1000 MG: 1 INJECTION, POWDER, FOR SOLUTION INTRAMUSCULAR; INTRAVENOUS at 13:34

## 2024-11-01 RX ADMIN — ATORVASTATIN CALCIUM 40 MG: 40 TABLET, FILM COATED ORAL at 09:14

## 2024-11-01 RX ADMIN — SODIUM CHLORIDE, PRESERVATIVE FREE 10 ML: 5 INJECTION INTRAVENOUS at 09:18

## 2024-11-01 RX ADMIN — FUROSEMIDE 40 MG: 40 TABLET ORAL at 09:14

## 2024-11-01 RX ADMIN — INSULIN LISPRO 1 UNITS: 100 INJECTION, SOLUTION INTRAVENOUS; SUBCUTANEOUS at 07:46

## 2024-11-01 RX ADMIN — METOPROLOL SUCCINATE 50 MG: 50 TABLET, EXTENDED RELEASE ORAL at 09:14

## 2024-11-01 RX ADMIN — MEPERIDINE HYDROCHLORIDE 12.5 MG: 25 INJECTION, SOLUTION INTRAMUSCULAR; INTRAVENOUS; SUBCUTANEOUS at 00:20

## 2024-11-01 RX ADMIN — TAMSULOSIN HYDROCHLORIDE 0.4 MG: 0.4 CAPSULE ORAL at 09:14

## 2024-11-01 RX ADMIN — INSULIN LISPRO 1 UNITS: 100 INJECTION, SOLUTION INTRAVENOUS; SUBCUTANEOUS at 11:34

## 2024-11-01 RX ADMIN — MEPERIDINE HYDROCHLORIDE 12.5 MG: 25 INJECTION, SOLUTION INTRAMUSCULAR; INTRAVENOUS; SUBCUTANEOUS at 01:02

## 2024-11-01 RX ADMIN — SODIUM CHLORIDE 500 ML: 9 INJECTION, SOLUTION INTRAVENOUS at 02:38

## 2024-11-01 RX ADMIN — AMIODARONE HYDROCHLORIDE 200 MG: 200 TABLET ORAL at 09:14

## 2024-11-01 RX ADMIN — CEFEPIME 1000 MG: 1 INJECTION, POWDER, FOR SOLUTION INTRAMUSCULAR; INTRAVENOUS at 04:51

## 2024-11-01 RX ADMIN — VANCOMYCIN HYDROCHLORIDE 2500 MG: 5 INJECTION, POWDER, LYOPHILIZED, FOR SOLUTION INTRAVENOUS at 02:40

## 2024-11-01 RX ADMIN — DONEPEZIL HYDROCHLORIDE 10 MG: 10 TABLET, FILM COATED ORAL at 21:52

## 2024-11-01 RX ADMIN — DOCUSATE SODIUM 50 MG AND SENNOSIDES 8.6 MG 2 TABLET: 8.6; 5 TABLET, FILM COATED ORAL at 09:14

## 2024-11-01 RX ADMIN — SODIUM CHLORIDE: 9 INJECTION, SOLUTION INTRAVENOUS at 11:16

## 2024-11-01 RX ADMIN — POLYETHYLENE GLYCOL 3350 17 G: 17 POWDER, FOR SOLUTION ORAL at 09:15

## 2024-11-01 RX ADMIN — INSULIN LISPRO 2 UNITS: 100 INJECTION, SOLUTION INTRAVENOUS; SUBCUTANEOUS at 16:48

## 2024-11-01 RX ADMIN — APIXABAN 2.5 MG: 2.5 TABLET, FILM COATED ORAL at 09:14

## 2024-11-01 RX ADMIN — APIXABAN 2.5 MG: 2.5 TABLET, FILM COATED ORAL at 21:52

## 2024-11-01 NOTE — PROGRESS NOTES
[facility]  Progress Note    Chief complaint :  Chief Complaint   Patient presents with    Gait Problem     Left knee replacement on tuesday; having trouble getting around at home; denies any injuries; wants to have rehab placement        Subjective:    No acute events overnight.  He has not felt any fevers or chills since his RRT 2 days ago.  Reports that pain is overall controlled.  He has not had much of an appetite but is able to tolerate p.o. intake.  He did have a small bowel movement yesterday.  He reports voiding without any issue.  Denies headache, nausea, vomiting, fever, chills, chest pain, shortness of breath.  SCDs in place.      ROS unremarkable except as stated above.    Past medical, surgical, family and social history were reviewed, non-contributory, and unchanged unless otherwise stated.      Objective:  BP (!) 100/53   Pulse 73   Temp 98.2 °F (36.8 °C) (Oral)   Resp 18   Ht 1.803 m (5' 11\")   Wt 116.1 kg (256 lb)   SpO2 97%   BMI 35.70 kg/m²     Physical Exam  Vitals reviewed.   Constitutional:       Appearance: Normal appearance.   HENT:      Head: Normocephalic and atraumatic.      Mouth/Throat:      Mouth: Mucous membranes are moist.   Cardiovascular:      Rate and Rhythm: Normal rate and regular rhythm.      Pulses:           Dorsalis pedis pulses are 2+ on the right side and 2+ on the left side.      Heart sounds: Murmur heard.   Pulmonary:      Effort: Pulmonary effort is normal.      Breath sounds: Normal breath sounds.   Abdominal:      Palpations: Abdomen is soft.      Tenderness: There is no abdominal tenderness. There is no guarding.   Musculoskeletal:      Comments: SCDs in place   Skin:     General: Skin is warm and dry.   Neurological:      General: No focal deficit present.      Mental Status: He is alert.      Sensory: No sensory deficit.      Comments: Strength/sensation intact in bilateral feet.  Left knee with clean dry bandage in

## 2024-11-01 NOTE — H&P
[facility]  Resident History and Physical      CHIEF COMPLAINT:    Chief Complaint   Patient presents with    Gait Problem     Left knee replacement on tuesday; having trouble getting around at home; denies any injuries; wants to have rehab placement         History of Present Illness:   Mason Gudino  is a 79 y.o. male patient of Abhinav Elkins MD  with a pertinent PMHx of paroxysmal A-fib, CAD status post stent, aortic stenosis, CKD, hypertension, hyperlipidemia, FLORIAN presents to the emergency department for falls. who presented to the ER from home with daughter and her boyfriend with chief complaint of gait problem.  Patient had left knee replacement 2 days ago and has had inability to ambulate since.  He had to or 3 episodes throughout the day where he started to fall but was caught by a family member and gently laid back down and did not actually fall.  That is separate point earlier today, he was unable to get up from toilet and decision was made to call ambulance to have him transported to emergency room so he could go to rehab facility.  He denies any other acute complaints or concerns.        ED course: In the ED vitals significant for temperature of 99.2, patient saturating 94% on room air, blood pressure 147/47, normal respiratory rate 16, normal pulse at 82.  Lab work revealed CBC which showed a white blood cell count of 10.9, hemoglobin 10.4, BMP with creatinine of 1.5 which is within his baseline range, glucose of 228, calcium 8.5  Imaging with left knee x-ray showed total right knee arthroplasty with no gross hardware abnormalities, postoperative soft tissue edema was noted.      Family Medicine was consulted to admit the patient for inability to ambulate    ROS:     Review of Systems   Constitutional:  Negative for chills and fever.   Respiratory:  Positive for cough (chronic). Negative for chest tightness and shortness of breath.    Cardiovascular:

## 2024-11-01 NOTE — PROGRESS NOTES
Patient present with shaking and tachypnea. Patient complaints of being cold. CPAP on. Vitals obtained, . RRT called. See new orders.

## 2024-11-01 NOTE — PROGRESS NOTES
[facility]  Progress Note    Chief complaint :  Chief Complaint   Patient presents with    Gait Problem     Left knee replacement on tuesday; having trouble getting around at home; denies any injuries; wants to have rehab placement        Subjective:    Overnight patient had a febrile episode with a Tmax up to 101.6.  He was vigorous and an RRT was called.  He is awake and oriented during the entire time but felt shaky.  Patient received IV fluids, started on IV cefepime and Vanco, and was ordered a CTA pulm as differential diagnoses were concern for sepsis versus SIRS. Patient describes feeling better. Patient denies chest pain, SOB, nausea, vomiting, bloody stools, fever, chills, changes in urination, changes in BM. Patient is tolerating diet.    Mason was seen this morning asleep in bed, awoken to verbal stimuli.  He is hard of hearing but very pleasant.  He states that he has had no complaints, has not had any shaking since a febrile episode overnight during the RRT.  He feels he is at his baseline.  Denies any pain at this time.  Wall suction with good urine output.    Past medical, surgical, family and social history were reviewed, non-contributory, and unchanged unless otherwise stated.    Review of Systems   Constitutional:  Negative for chills and fever.   Respiratory:  Positive for cough (chronic). Negative for chest tightness and shortness of breath.    Cardiovascular:  Negative for chest pain and palpitations.   Gastrointestinal:  Positive for constipation. Negative for blood in stool, diarrhea, nausea and vomiting.   Genitourinary:  Negative for difficulty urinating, dysuria and hematuria.   Musculoskeletal:  Positive for gait problem.   Neurological:  Negative for seizures and syncope.   Psychiatric/Behavioral:  Negative for confusion.        Objective:  BP (!) 105/47   Pulse 72   Temp 100.1 °F (37.8 °C) (Oral)   Resp 18   Ht 1.803 m (5' 11\")   Wt 116.1 kg  Time: 11/01/24 12:48 AM   Result Value Ref Range    Lactic Acid, Sepsis 1.2 0.5 - 1.9 mmol/L   Culture, Blood 1    Collection Time: 11/01/24 12:50 AM    Specimen: Blood   Result Value Ref Range    Specimen Description .BLOOD     Special Requests Site: Blood     Culture NO GROWTH <24 HRS    EKG 12 Lead    Collection Time: 11/01/24  2:02 AM   Result Value Ref Range    Ventricular Rate 76 BPM    Atrial Rate 76 BPM    P-R Interval 174 ms    QRS Duration 120 ms    Q-T Interval 414 ms    QTc Calculation (Bazett) 465 ms    P Axis 55 degrees    R Axis -36 degrees    T Axis 46 degrees   Culture, Blood 2    Collection Time: 11/01/24  2:20 AM    Specimen: Blood   Result Value Ref Range    Specimen Description .BLOOD     Special Requests Site: Blood     Culture NO GROWTH <24 HRS    Lactate, Sepsis    Collection Time: 11/01/24  2:50 AM   Result Value Ref Range    Lactic Acid, Sepsis 1.2 0.5 - 1.9 mmol/L   POCT Glucose    Collection Time: 11/01/24  4:53 AM   Result Value Ref Range    POC Glucose 243 (H) 74 - 99 mg/dL       Radiology and other tests reviewed:  XR CHEST PORTABLE   Final Result   No acute process.         XR KNEE LEFT (3 VIEWS)   Final Result   Total right knee arthroplasty with no gross hardware abnormalities.         CTA PULMONARY W CONTRAST    (Results Pending)       Assessment:  Active Hospital Problems    Diagnosis Date Noted    SIRS (systemic inflammatory response syndrome) ([facility]) [R65.10] 11/01/2024    Knee swelling [M25.469] 11/01/2024    Uncontrolled type 2 diabetes mellitus with hyperglycemia (HCC) [E11.65] 11/01/2024    Inability to ambulate due to left knee [R26.2] 10/31/2024       Plan:  Inability to ambulate secondary to knee replacement  Total left knee replacement on 10/29/2024.  Consult PT/OT/social work for placement  Continue home Percocet 5-325 mg every 6 hours as needed    Febrile episode  And RRT was called overnight as patient had fever up to 101.6 and rigors.  On examination his lower
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
## 2024-11-01 NOTE — PLAN OF CARE
Problem: ABCDS Injury Assessment  Goal: Absence of physical injury  11/1/2024 1208 by Breann Morton, RN  Outcome: Progressing     Problem: Skin/Tissue Integrity  Goal: Absence of new skin breakdown  Description: 1.  Monitor for areas of redness and/or skin breakdown  2.  Assess vascular access sites hourly  3.  Every 4-6 hours minimum:  Change oxygen saturation probe site  4.  Every 4-6 hours:  If on nasal continuous positive airway pressure, respiratory therapy assess nares and determine need for appliance change or resting period.  11/1/2024 1208 by Breann Morton, RN  Outcome: Progressing     Problem: Discharge Planning  Goal: Discharge to home or other facility with appropriate resources  11/1/2024 1208 by Breann Morton, RN  Outcome: Progressing     Problem: Chronic Conditions and Co-morbidities  Goal: Patient's chronic conditions and co-morbidity symptoms are monitored and maintained or improved  11/1/2024 1208 by Breann Morton, RN  Outcome: Progressing     Problem: Safety - Adult  Goal: Free from fall injury  Outcome: Progressing

## 2024-11-01 NOTE — SIGNIFICANT EVENT
RRT note    CTSP for change in status    Pt became sob and increased RR and shaking        PHYSICAL EXAM:    Vitals:  BP (!) 150/79   Pulse 93   Temp (!) 101.6 °F (38.7 °C) (Axillary)   Resp 30   Ht 1.803 m (5' 11\")   Wt 116.1 kg (256 lb)   SpO2 96%   BMI 35.70 kg/m²     General:  Appears comfortable. Answers questions appropriately and cooperative with exam  HEENT:  Mucous membranes moist. No erythema, rhinorrhea, or post-nasal drip noted.  Neck:  No carotid bruits.  Heart:  Rhythm regular at rate of 96  Lungs:  CTA.  No wheeze, rales, or rhonchi  Abdomen:  Positive bowel sounds positive.  Soft.  Non-tender. No guarding, rebound or rigidity.  Breast/Rectal/Genitourinary: not pertinent.    Extremities:  Negative for lower extremity edema. Left knee swollen  Skin:  Warm and dry. Pt very warm to touch  Vascular: 2/4 Dorsalis Pedis pulses bilaterally.  Neuro:  Cranial nerves 2-12 grossly intact, no focal weakness or change in sensation noted.  Extraocular muscles intact.  Pupils equal, round, reactive to light.  Positive for rigors    Did check temp and noted 101.6 ax temp.     Demerol ordered for rigors and tylenol for fever    Chart reviewed and discussed with resident.    Did order iv fluids with pt on diuretic and now having insensible losses to avoid hypovolemia.     Labs/cxr/EKG ordered along with abg    Once bc obtained, cefepime and vanc to be given    Pt having SIRS with tachypnea and fever. Consideration for hematoma from recent injury. Also consideration for infection/sepsis    SIRS with possible sepsis  Knee swelling.  Dm type 2 uncontrolled  Htn    Critical care time is 33 min

## 2024-11-01 NOTE — PATIENT CARE CONFERENCE
P Quality Flow/Interdisciplinary Rounds Progress Note        Quality Flow Rounds held on November 1, 2024    Disciplines Attending:  Bedside Nurse, , , and Nursing Unit Leadership    Mason Gudino was admitted on 10/31/2024  4:14 PM    Anticipated Discharge Date:       Disposition:    Jace Score:  Jace Scale Score: 17    Readmission Risk              Risk of Unplanned Readmission:  18           Discussed patient goal for the day, patient clinical progression, and barriers to discharge.  The following Goal(s) of the Day/Commitment(s) have been identified:   Discharge planning      Mae Garcia RN  November 1, 2024

## 2024-11-01 NOTE — PROGRESS NOTES
4 Eyes Skin Assessment     NAME:  Mason Gudino  YOB: 1945  MEDICAL RECORD NUMBER:  [id number]    The patient is being assessed for  Admission    I agree that at least one RN has performed a thorough Head to Toe Skin Assessment on the patient. ALL assessment sites listed below have been assessed.      Areas assessed by both nurses:    Head, Face, Ears, Shoulders, Back, Chest, Arms, Elbows, Hands, Sacrum. Buttock, Coccyx, Ischium, and Legs. Feet and Heels        Does the Patient have a Wound? No noted wound(s)    Blanchable redness to buttocks and surgical incision to left knee  Jace Prevention initiated by RN: No  Wound Care Orders initiated by RN: No    Pressure Injury (Stage 3,4, Unstageable, DTI, NWPT, and Complex wounds) if present, place Wound referral order by RN under : No    New Ostomies, if present place, Ostomy referral order under : No     Nurse 1 eSignature: Electronically signed by Malka Gross RN on 10/31/24 at 10:29 PM EDT    **SHARE this note so that the co-signing nurse can place an eSignature**    Nurse 2 eSignature: {Esignature:[id number]}

## 2024-11-01 NOTE — PROGRESS NOTES
Spiritual Health History and Assessment/Progress Note  [facility]    Attempted Encounter,  ,  ,      Name: Mason Gudino MRN: [id number]    Age: 79 y.o.     Sex: male   Language: English   Baptism: Anabaptism   Inability to ambulate due to left knee     Date: 11/1/2024                           Spiritual Assessment began in [facility] 7S INTERMDIATE MED SURG        Referral/Consult From: Rounding   Encounter Overview/Reason: Attempted Encounter  Service Provided For: Patient    Nataly, Belief, Meaning:   Patient unable to assess at this time  Family/Friends No family/friends present      Importance and Influence:  Patient unable to assess at this time  Family/Friends No family/friends present    Community:  Patient Other: unable to assess. Patient sleeping.  Family/Friends No family/friends present    Assessment and Plan of Care:     Patient Interventions include: Other: Left devotional material said silent prayer  Family/Friends Interventions include: No family/friends present    Patient Plan of Care: Spiritual Care available upon further referral  Family/Friends Plan of Care: No family/friends present    Electronically signed by Chaplain Roro on 11/1/2024 at 7:22 PM

## 2024-11-01 NOTE — CARE COORDINATION
Met with patient and family about diagnosis and discharge plan of care. Pt readmit for difficulty ambulating, voiding and temp. Pt just had right TKA on 10/29. Went home with [facility]. Lives with daughter in ranch home, has wheeled walker, high commode and walk in shower with seat. Urology  consult pending. Has c-pap in room. Pt/family receptive to rehab. 1 and 2nd choices [facility] and [facility] do not have beds. Referral called to [facility], left voicemail and await call back. Pt will not qualify for [facility]. PCP is Dr Elkins. PT CANNOT DISCHARGE OVER WEEKEND. Will follow-[facility]    Addend: referral also given to [facility]. Await call back. Still NO DISCHARGE OVER WEEKEND-[facility]

## 2024-11-01 NOTE — PROGRESS NOTES
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  No focal osseous lesion.  No  evidence of joint effusion. No focal soft tissue abnormality.  Total knee  arthroplasty with no gross hardware abnormalitie    Pertinent Medical History: L TKA 10/29/2024, HTN, R TKA , cardiac      Precautions:  Fall Risk, WBAT L LE      Assessment of current deficits    [x] Functional mobility  [x]ADLs  [x] Strength               [x]Cognition    [x] Functional transfers   [x] IADLs         [x] Safety Awareness   [x]Endurance    [x] Fine Coordination              [x] Balance      [] Vision/perception   [x]Sensation     []Gross Motor Coordination  [] ROM  [] Delirium                   [] Motor Control     OT PLAN OF CARE   OT POC based on physician orders, patient diagnosis and results of clinical assessment    Frequency/Duration  2-4 days/wk for 2 - 4weeks PRN   Specific OT Treatment Interventions to include:   ADL retraining/adapted techniques and AE recommendations to increase functional independence within precautions                    Energy conservation techniques to improve tolerance for selfcare routine   Functional transfer/mobility training/DME recommendations for increased independence, safety and fall prevention         Patient/family education to increase safety and  functional independence             Environmental modifications for safe mobility and completion of ADLs                             Therapeutic activity to improve functional performance during ADLs.                                         Therapeutic exercise to improve tolerance and functional strength for ADLs    Balance retraining/tolerance tasks for facilitation of postural control with dynamic challenges during ADLs .      Positioning to improve functional independence      Recommended Adaptive Equipment: TBD     Home Living: Pt lives with daughter,  1 story with steps to enter   Bathroom setup: walk in shower, shower chair    Equipment owned: walker, cane     Prior Level of Function: since surgery assist  with LE ADLs , and  with IADLs; ambulated with walker     Pain Level: R shoulder   Cognition: A&O:  pleasant, following commands, conversing    Memory:  fair +    Sequencing:  fair+   Problem solving:  fair+    Judgement/safety:  fair +      Functional Assessment:  AM-PAC Daily Activity Raw Score: 15/24   Initial Eval Status  Date: 11/1/2024 Treatment Status  Date: STGs = LTGs  Time frame: 10-14 days   Feeding Independent     Grooming SBA/set-up ,seated   Decrease standing balance   Supervision    UB Dressing Min A  Don/[facility] gown   Supervision    LB Dressing Max A  Not able to reach feet, L LE swelling, assist with standing balance   SBA    Bathing Mod A   SBA    Toileting Min A   Supervision    Bed Mobility  Mod A  Supine to sit   Supervision    Functional Transfers Mod A  Sit stand from bed    Patient fearful of falling and legs giving out   Supervision    Functional Mobility Min A,w/walker   Couple steps from bed to chair   Shuffling feet   Supervision  with good tolerance    Balance Sitting:     Static:  Independent    Dynamic:SBA   Standing: Mod/Chanel   Independent/supervision    Activity Tolerance SOB with activity   On room air   Sats >90%  Good  with ADL activity    Visual/  Perceptual

## 2024-11-01 NOTE — PROGRESS NOTES
Pharmacy Consultation Note  (Antibiotic Dosing and Monitoring)    Initial consult date: 11/1/2024  Consulting physician/provider: Mason Reynoso MD   Drug: Vancomycin  Indication: Bacteremia/Bone and Joint    Age/  Gender Height Weight IBW  Allergy Information   79 y.o./male 180.3 cm (5' 11\") 116.1 kg (256 lb)     Ideal body weight: 75.3 kg (166 lb 0.1 oz)  Adjusted ideal body weight: 91.6 kg (202 lb 0.1 oz)   Patient has no known allergies.      Renal Function:  Recent Labs     10/31/24  1742 11/01/24  0032 11/01/24  0046   BUN 25* 26* 26*   CREATININE 1.5* 1.4* 1.4*       Intake/Output Summary (Last 24 hours) at 11/1/2024 1927  Last data filed at 11/1/2024 1729  Gross per 24 hour   Intake --   Output 475 ml   Net -475 ml       Vancomycin Monitoring:  Trough:  No results for input(s): \"VANCOTROUGH\" in the last 72 hours.  Random:  No results for input(s): \"VANCORANDOM\" in the last 72 hours.    Vancomycin Administration Times:  Recent vancomycin administrations                     vancomycin (VANCOCIN) 2,500 mg in sodium chloride 0.9 % 500 mL IVPB (mg) 2,500 mg New Bag 11/01/24 0240               Assessment:  Patient is a 79 y.o. male who has been initiated on vancomycin.  Estimated Creatinine Clearance: 54 mL/min (A) (based on SCr of 1.4 mg/dL (H)).  To dose vancomycin, pharmacy will be utilizing [employer] calculation software for goal AUC/KASI 400-600 mg/L-hr (predicted AUC/KASI = 578, Tr =17.9 mcg/mL)  Creatinine at baseline (~1.4 mg/dL)    Plan:  Will continue vancomycin 1500 mg IV every 24 hours.   Will check vancomycin levels when appropriate.  Will continue to monitor renal function.  Pharmacy to follow.      Electronically signed by Kerri Ramírez RPH, Pharm.D. 11/1/2024 7:27 PM   Ext: [phone]    NANETTE: [phone]  SEY: [phone]  SJW: [phone]

## 2024-11-01 NOTE — PLAN OF CARE
Problem: ABCDS Injury Assessment  Goal: Absence of physical injury  Outcome: Progressing     Problem: Skin/Tissue Integrity  Goal: Absence of new skin breakdown  Description: 1.  Monitor for areas of redness and/or skin breakdown  2.  Assess vascular access sites hourly  3.  Every 4-6 hours minimum:  Change oxygen saturation probe site  4.  Every 4-6 hours:  If on nasal continuous positive airway pressure, respiratory therapy assess nares and determine need for appliance change or resting period.  Outcome: Progressing     Problem: Discharge Planning  Goal: Discharge to home or other facility with appropriate resources  Outcome: Progressing  Flowsheets (Taken 10/31/2024 1815 by Emmy Ann, RN)  Discharge to home or other facility with appropriate resources: Refer to discharge planning if patient needs post-hospital services based on physician order or complex needs related to functional status, cognitive ability or social support system     Problem: Chronic Conditions and Co-morbidities  Goal: Patient's chronic conditions and co-morbidity symptoms are monitored and maintained or improved  Outcome: Progressing

## 2024-11-01 NOTE — ED NOTES
ED to Inpatient Handoff Report    Notified gerard that electronic handoff available and patient ready for transport to room 0732.    Safety Risks: None identified, Home safety issues, and Risk of falls    Patient in Restraints: no    Constant Observer or Patient : no    Telemetry Monitoring Ordered :No           Order to transfer to unit without monitor:NO    Last MEWS: 1 Time completed: 2101    Deterioration Index Score:   Predictive Model Details          24 (Normal)  Factor Value    Calculated 10/31/2024 21:04 56% Age 79 years old    Deterioration Index Model 16% Potassium 4.7 mmol/L     7% WBC count 10.9 k/uL     7% Systolic 141     4% BUN abnormal (25 mg/dL)     3% Sodium 135 mmol/L     3% Respiratory rate 17     3% Hematocrit abnormal (31.4 %)     0% Temperature 98.8 °F (37.1 °C)     0% Pulse oximetry 96 %     0% Pulse 76        Vitals:    10/31/24 1627 10/31/24 1834 10/31/24 2101   BP: (!) 147/47  (!) 141/58   Pulse: 82  76   Resp: 16 16 17   Temp: 99.2 °F (37.3 °C)  98.8 °F (37.1 °C)   TempSrc: Oral  Oral   SpO2: 94%  96%   Weight: 116.1 kg (256 lb)     Height: 1.803 m (5' 11\")           Opportunity for questions and clarification was provided.

## 2024-11-02 PROBLEM — N17.9 AKI (ACUTE KIDNEY INJURY) (HCC): Status: ACTIVE | Noted: 2024-11-02

## 2024-11-02 PROBLEM — I95.9 HYPOTENSION: Status: ACTIVE | Noted: 2024-11-02

## 2024-11-02 LAB
ALBUMIN SERPL-MCNC: 2.8 G/DL (ref 3.5–5.2)
ALP SERPL-CCNC: 68 U/L (ref 40–129)
ALT SERPL-CCNC: 10 U/L (ref 0–40)
ANION GAP SERPL CALCULATED.3IONS-SCNC: 14 MMOL/L (ref 7–16)
AST SERPL-CCNC: 17 U/L (ref 0–39)
BASOPHILS # BLD: 0.03 K/UL (ref 0–0.2)
BASOPHILS NFR BLD: 0 % (ref 0–2)
BILIRUB SERPL-MCNC: 0.6 MG/DL (ref 0–1.2)
BUN SERPL-MCNC: 33 MG/DL (ref 6–23)
CALCIUM SERPL-MCNC: 8.4 MG/DL (ref 8.6–10.2)
CHLORIDE SERPL-SCNC: 99 MMOL/L (ref 98–107)
CO2 SERPL-SCNC: 22 MMOL/L (ref 22–29)
CREAT SERPL-MCNC: 1.9 MG/DL (ref 0.7–1.2)
CREAT UR-MCNC: 160.2 MG/DL (ref 40–278)
EOSINOPHIL # BLD: 0.01 K/UL (ref 0.05–0.5)
EOSINOPHILS RELATIVE PERCENT: 0 % (ref 0–6)
ERYTHROCYTE [DISTWIDTH] IN BLOOD BY AUTOMATED COUNT: 13 % (ref 11.5–15)
GFR, ESTIMATED: 34 ML/MIN/1.73M2
GLUCOSE BLD-MCNC: 230 MG/DL (ref 74–99)
GLUCOSE BLD-MCNC: 241 MG/DL (ref 74–99)
GLUCOSE BLD-MCNC: 274 MG/DL (ref 74–99)
GLUCOSE BLD-MCNC: 334 MG/DL (ref 74–99)
GLUCOSE SERPL-MCNC: 199 MG/DL (ref 74–99)
HCT VFR BLD AUTO: 30.1 % (ref 37–54)
HGB BLD-MCNC: 9.8 G/DL (ref 12.5–16.5)
IMM GRANULOCYTES # BLD AUTO: 0.12 K/UL (ref 0–0.58)
IMM GRANULOCYTES NFR BLD: 1 % (ref 0–5)
LYMPHOCYTES NFR BLD: 1.18 K/UL (ref 1.5–4)
LYMPHOCYTES RELATIVE PERCENT: 8 % (ref 20–42)
MCH RBC QN AUTO: 32.1 PG (ref 26–35)
MCHC RBC AUTO-ENTMCNC: 32.6 G/DL (ref 32–34.5)
MCV RBC AUTO: 98.7 FL (ref 80–99.9)
MONOCYTES NFR BLD: 1.1 K/UL (ref 0.1–0.95)
MONOCYTES NFR BLD: 7 % (ref 2–12)
NEUTROPHILS NFR BLD: 84 % (ref 43–80)
NEUTS SEG NFR BLD: 13.31 K/UL (ref 1.8–7.3)
PLATELET # BLD AUTO: 174 K/UL (ref 130–450)
PMV BLD AUTO: 10.9 FL (ref 7–12)
POTASSIUM SERPL-SCNC: 4.2 MMOL/L (ref 3.5–5)
PROT SERPL-MCNC: 5.7 G/DL (ref 6.4–8.3)
RBC # BLD AUTO: 3.05 M/UL (ref 3.8–5.8)
SODIUM SERPL-SCNC: 135 MMOL/L (ref 132–146)
SODIUM UR-SCNC: <20 MMOL/L
WBC OTHER # BLD: 15.8 K/UL (ref 4.5–11.5)

## 2024-11-02 PROCEDURE — 51701 INSERT BLADDER CATHETER: CPT

## 2024-11-02 PROCEDURE — 80053 COMPREHEN METABOLIC PANEL: CPT

## 2024-11-02 PROCEDURE — 6360000002 HC RX W HCPCS

## 2024-11-02 PROCEDURE — 6360000002 HC RX W HCPCS: Performed by: INTERNAL MEDICINE

## 2024-11-02 PROCEDURE — 1200000000 HC SEMI PRIVATE

## 2024-11-02 PROCEDURE — 97161 PT EVAL LOW COMPLEX 20 MIN: CPT

## 2024-11-02 PROCEDURE — 85025 COMPLETE CBC W/AUTO DIFF WBC: CPT

## 2024-11-02 PROCEDURE — 2580000003 HC RX 258

## 2024-11-02 PROCEDURE — 82570 ASSAY OF URINE CREATININE: CPT

## 2024-11-02 PROCEDURE — 2580000003 HC RX 258: Performed by: INTERNAL MEDICINE

## 2024-11-02 PROCEDURE — 97110 THERAPEUTIC EXERCISES: CPT

## 2024-11-02 PROCEDURE — 84300 ASSAY OF URINE SODIUM: CPT

## 2024-11-02 PROCEDURE — 94640 AIRWAY INHALATION TREATMENT: CPT

## 2024-11-02 PROCEDURE — 94660 CPAP INITIATION&MGMT: CPT

## 2024-11-02 PROCEDURE — 82962 GLUCOSE BLOOD TEST: CPT

## 2024-11-02 PROCEDURE — 51798 US URINE CAPACITY MEASURE: CPT

## 2024-11-02 PROCEDURE — 99232 SBSQ HOSP IP/OBS MODERATE 35: CPT | Performed by: STUDENT IN AN ORGANIZED HEALTH CARE EDUCATION/TRAINING PROGRAM

## 2024-11-02 PROCEDURE — 6370000000 HC RX 637 (ALT 250 FOR IP): Performed by: FAMILY MEDICINE

## 2024-11-02 PROCEDURE — 6370000000 HC RX 637 (ALT 250 FOR IP)

## 2024-11-02 RX ORDER — 0.9 % SODIUM CHLORIDE 0.9 %
1000 INTRAVENOUS SOLUTION INTRAVENOUS ONCE
Status: COMPLETED | OUTPATIENT
Start: 2024-11-02 | End: 2024-11-02

## 2024-11-02 RX ORDER — INSULIN LISPRO 100 [IU]/ML
0-8 INJECTION, SOLUTION INTRAVENOUS; SUBCUTANEOUS
Status: DISCONTINUED | OUTPATIENT
Start: 2024-11-02 | End: 2024-11-07 | Stop reason: HOSPADM

## 2024-11-02 RX ORDER — SODIUM CHLORIDE 9 MG/ML
INJECTION, SOLUTION INTRAVENOUS CONTINUOUS
Status: DISCONTINUED | OUTPATIENT
Start: 2024-11-02 | End: 2024-11-02

## 2024-11-02 RX ORDER — SODIUM CHLORIDE 9 MG/ML
INJECTION, SOLUTION INTRAVENOUS CONTINUOUS
Status: DISCONTINUED | OUTPATIENT
Start: 2024-11-02 | End: 2024-11-05

## 2024-11-02 RX ORDER — 0.9 % SODIUM CHLORIDE 0.9 %
1000 INTRAVENOUS SOLUTION INTRAVENOUS ONCE
Status: DISCONTINUED | OUTPATIENT
Start: 2024-11-02 | End: 2024-11-02

## 2024-11-02 RX ORDER — WATER 10 ML/10ML
20 INJECTION INTRAMUSCULAR; INTRAVENOUS; SUBCUTANEOUS EVERY 12 HOURS
Status: DISCONTINUED | OUTPATIENT
Start: 2024-11-02 | End: 2024-11-04

## 2024-11-02 RX ORDER — CEFEPIME HYDROCHLORIDE 2 G/1
2 INJECTION, POWDER, FOR SOLUTION INTRAVENOUS EVERY 12 HOURS
Status: DISCONTINUED | OUTPATIENT
Start: 2024-11-02 | End: 2024-11-02 | Stop reason: SDUPTHER

## 2024-11-02 RX ADMIN — SODIUM CHLORIDE, PRESERVATIVE FREE 10 ML: 5 INJECTION INTRAVENOUS at 20:52

## 2024-11-02 RX ADMIN — IPRATROPIUM BROMIDE 0.5 MG: 0.5 SOLUTION RESPIRATORY (INHALATION) at 20:57

## 2024-11-02 RX ADMIN — INSULIN LISPRO 4 UNITS: 100 INJECTION, SOLUTION INTRAVENOUS; SUBCUTANEOUS at 17:29

## 2024-11-02 RX ADMIN — APIXABAN 2.5 MG: 2.5 TABLET, FILM COATED ORAL at 10:11

## 2024-11-02 RX ADMIN — PETROLATUM: 420 OINTMENT TOPICAL at 10:12

## 2024-11-02 RX ADMIN — AMIODARONE HYDROCHLORIDE 200 MG: 200 TABLET ORAL at 10:11

## 2024-11-02 RX ADMIN — WATER 2000 MG: 1 INJECTION INTRAMUSCULAR; INTRAVENOUS; SUBCUTANEOUS at 13:43

## 2024-11-02 RX ADMIN — TAMSULOSIN HYDROCHLORIDE 0.4 MG: 0.4 CAPSULE ORAL at 10:11

## 2024-11-02 RX ADMIN — PETROLATUM: 420 OINTMENT TOPICAL at 21:32

## 2024-11-02 RX ADMIN — ATORVASTATIN CALCIUM 40 MG: 40 TABLET, FILM COATED ORAL at 10:11

## 2024-11-02 RX ADMIN — VANCOMYCIN HYDROCHLORIDE 1500 MG: 10 INJECTION, POWDER, LYOPHILIZED, FOR SOLUTION INTRAVENOUS at 21:07

## 2024-11-02 RX ADMIN — INSULIN LISPRO 2 UNITS: 100 INJECTION, SOLUTION INTRAVENOUS; SUBCUTANEOUS at 20:51

## 2024-11-02 RX ADMIN — DOCUSATE SODIUM 50 MG AND SENNOSIDES 8.6 MG 2 TABLET: 8.6; 5 TABLET, FILM COATED ORAL at 10:11

## 2024-11-02 RX ADMIN — INSULIN LISPRO 3 UNITS: 100 INJECTION, SOLUTION INTRAVENOUS; SUBCUTANEOUS at 12:03

## 2024-11-02 RX ADMIN — CEFEPIME 2000 MG: 1 INJECTION, POWDER, FOR SOLUTION INTRAMUSCULAR; INTRAVENOUS at 01:21

## 2024-11-02 RX ADMIN — ARFORMOTEROL TARTRATE 15 MCG: 15 SOLUTION RESPIRATORY (INHALATION) at 20:57

## 2024-11-02 RX ADMIN — IPRATROPIUM BROMIDE 0.5 MG: 0.5 SOLUTION RESPIRATORY (INHALATION) at 04:57

## 2024-11-02 RX ADMIN — SODIUM CHLORIDE 1000 ML: 9 INJECTION, SOLUTION INTRAVENOUS at 14:26

## 2024-11-02 RX ADMIN — METOPROLOL SUCCINATE 50 MG: 50 TABLET, EXTENDED RELEASE ORAL at 10:11

## 2024-11-02 RX ADMIN — IPRATROPIUM BROMIDE 0.5 MG: 0.5 SOLUTION RESPIRATORY (INHALATION) at 08:53

## 2024-11-02 RX ADMIN — ARFORMOTEROL TARTRATE 15 MCG: 15 SOLUTION RESPIRATORY (INHALATION) at 08:53

## 2024-11-02 RX ADMIN — APIXABAN 2.5 MG: 2.5 TABLET, FILM COATED ORAL at 21:07

## 2024-11-02 RX ADMIN — ALBUTEROL SULFATE 2.5 MG: 2.5 SOLUTION RESPIRATORY (INHALATION) at 04:57

## 2024-11-02 RX ADMIN — WATER 20 ML: 1 INJECTION INTRAMUSCULAR; INTRAVENOUS; SUBCUTANEOUS at 02:12

## 2024-11-02 RX ADMIN — SODIUM CHLORIDE: 9 INJECTION, SOLUTION INTRAVENOUS at 22:17

## 2024-11-02 RX ADMIN — INSULIN LISPRO 1 UNITS: 100 INJECTION, SOLUTION INTRAVENOUS; SUBCUTANEOUS at 06:26

## 2024-11-02 RX ADMIN — IPRATROPIUM BROMIDE 0.5 MG: 0.5 SOLUTION RESPIRATORY (INHALATION) at 13:14

## 2024-11-02 RX ADMIN — PETROLATUM: 420 OINTMENT TOPICAL at 04:23

## 2024-11-02 RX ADMIN — SODIUM CHLORIDE, PRESERVATIVE FREE 10 ML: 5 INJECTION INTRAVENOUS at 10:13

## 2024-11-02 RX ADMIN — DONEPEZIL HYDROCHLORIDE 10 MG: 10 TABLET, FILM COATED ORAL at 21:07

## 2024-11-02 RX ADMIN — ACETAMINOPHEN 650 MG: 325 TABLET ORAL at 04:00

## 2024-11-02 NOTE — PLAN OF CARE
Problem: ABCDS Injury Assessment  Goal: Absence of physical injury  11/2/2024 0131 by Mirian Yañez RN  Outcome: Progressing  11/1/2024 1208 by Breann Morton RN  Outcome: Progressing     Problem: Skin/Tissue Integrity  Goal: Absence of new skin breakdown  Description: 1.  Monitor for areas of redness and/or skin breakdown  2.  Assess vascular access sites hourly  3.  Every 4-6 hours minimum:  Change oxygen saturation probe site  4.  Every 4-6 hours:  If on nasal continuous positive airway pressure, respiratory therapy assess nares and determine need for appliance change or resting period.  11/2/2024 0131 by Mirian Yañez RN  Outcome: Progressing  11/1/2024 1208 by Breann Morton RN  Outcome: Progressing     Problem: Discharge Planning  Goal: Discharge to home or other facility with appropriate resources  11/2/2024 0131 by Mirian Yañez RN  Outcome: Progressing  11/1/2024 1208 by Breann Morton RN  Outcome: Progressing     Problem: Chronic Conditions and Co-morbidities  Goal: Patient's chronic conditions and co-morbidity symptoms are monitored and maintained or improved  11/2/2024 0131 by Mirian Yañez RN  Outcome: Progressing  11/1/2024 1208 by Breann Morton RN  Outcome: Progressing     Problem: Safety - Adult  Goal: Free from fall injury  11/2/2024 0131 by Mirian Yañez RN  Outcome: Progressing  11/1/2024 1208 by Breann Morton, RN  Outcome: Progressing

## 2024-11-02 NOTE — PROGRESS NOTES
keeping david vertical 2/2 his weakness and stand syed.  Walked to chair with a constant Mod x 2 assist which was 10' or <.  Poor foot-floor clearances to chair.  High fall risks.  Transfer to chair was a mod assist to guide back end and safely sit to chair.  Return to room 15' later with RN x 3 to get him back to bed.  Once again, mod/max x 2-3 assist to stand from chair. No post positional change dizziness expressed.  Walked to 1-3' to bed to hands on assist min x 2 for safety.  Able to sit unassisted on EOB.  Transfer to supine Max/dep x 2-3.  Max/dep x 3 to get to central bed position. Air mattress in use.  RN's remain in room on my exit.      Treatment:  Patient practiced and was instructed in the following treatment:    Eval  Transfers  Functional    Pt was left + with call light left by patient.    Chair/bed alarm: +    Pt's/ family goals   1. Rehab    Patient and or family understand(s) diagnosis, prognosis, and plan of care.    PLAN:    PT care will be provided in accordance with the objectives noted above. Exercises and functional mobility practice will be used as well as appropriate assistive devices or modalities to obtain goals. Patient and family education will also be administered as needed.    Frequency of treatments:  Daily    Total Treatment Time  30 total with eval and 2 visit. minutes     Evaluation Time includes thorough review of current medical information, gathering information on past medical history/social history and prior level of function, completion of standardized testing/informal observation of tasks, assessment of data and education on plan of care and goals.    CPT codes:  [x] Low Complexity PT evaluation 79836  [] Moderate Complexity PT evaluation 72859  [] High Complexity PT evaluation 65127  [] PT Re-evaluation 06346  [] Gait training 45337 ** minutes  [] Manual therapy 23976 ** minutes  [x] Therapeutic activities 47772 ** minutes  [] Therapeutic exercises 65137 ** minutes  []

## 2024-11-02 NOTE — PROGRESS NOTES
Pharmacy Consultation Note  (Antibiotic Dosing and Monitoring)    Initial consult date: 11/1/2024  Consulting physician/provider: Mason Reynoso MD   Drug: Vancomycin  Indication: Bacteremia/Bone and Joint    Age/  Gender Height Weight IBW  Allergy Information   79 y.o./male 180.3 cm (5' 11\") 116.1 kg (256 lb)     Ideal body weight: 75.3 kg (166 lb 0.1 oz)  Adjusted ideal body weight: 91.6 kg (202 lb 0.1 oz)   Patient has no known allergies.      Renal Function:  Recent Labs     11/01/24  0032 11/01/24  0046 11/02/24  0400   BUN 26* 26* 33*   CREATININE 1.4* 1.4* 1.9*       Intake/Output Summary (Last 24 hours) at 11/2/2024 1043  Last data filed at 11/1/2024 2159  Gross per 24 hour   Intake --   Output 475 ml   Net -475 ml       Vancomycin Monitoring:  Trough:  No results for input(s): \"VANCOTROUGH\" in the last 72 hours.  Random:  No results for input(s): \"VANCORANDOM\" in the last 72 hours.    Vancomycin Administration Times:  Recent vancomycin administrations                     vancomycin (VANCOCIN) 1,500 mg in sodium chloride 0.9 % 250 mL IVPB (mg) 1,500 mg New Bag 11/01/24 2148    vancomycin (VANCOCIN) 2,500 mg in sodium chloride 0.9 % 500 mL IVPB (mg) 2,500 mg New Bag 11/01/24 0240                      Assessment:  Patient is a 79 y.o. male who has been initiated on vancomycin.  Estimated Creatinine Clearance: 40 mL/min (A) (based on SCr of 1.9 mg/dL (H)).  To dose vancomycin, pharmacy will be utilizing [employer] calculation software for goal AUC/KASI 400-600 mg/L-hr (predicted AUC/KASI = 578, Tr =17.9 mcg/mL)  Creatinine at baseline (~1.4 mg/dL)    Plan:  Will continue vancomycin 1500 mg IV every 24 hours.   Will check vancomycin levels when appropriate.  Will continue to monitor renal function.  Pharmacy to follow.      Electronically signed by Ave Cuello RPH, Pharm.D. 11/2/2024 10:43 AM   Ext: [phone]    NANETTE: [phone]  SEY: [phone]  [facility]: [phone]

## 2024-11-02 NOTE — PROGRESS NOTES
RN message out to Dr Gramajo re: relay primary's concern for septic joint.   Per Dr. Gramajo she spoke to Dr. Blackburn who rounded and patient  yesterday and relayed that patient does not have septic joint.

## 2024-11-02 NOTE — PROGRESS NOTES
Orthopedic surgery    Patient seen/examined.   Readmitted for placement as he was not doing well at home and family felt he needed to go to a facility.        On exam his knee is typical for a postop knee that is 4 days out.  His dressing is intact.  There is expected level of swelling.      Labs reviewed showing elevated white count and inflammatory markers and several low grade fever, none recent.      Assessment: 4 days postop from total knee arthroplasty, readmitted to be placed at skilled facility due to difficulties at home    I have no concern for infection related to his knee at this point.  He is 4 days out from his knee replacement.  His main reason for readmission was difficulty getting around at home and need for placement at a skilled nursing facility.  He is stable for discharge to facility once placed and once medical issues have stabilized.  If he is receiving IV antibiotics due to concern for septic joint these can be discontinued as he does not have a septic joint.  If they are needed for other reasons will defer to infectious disease.  I will continue to follow      Lázaro Blackburn MD  [facility]

## 2024-11-02 NOTE — PROGRESS NOTES
[facility]  [facility]  Progress Note    SUBJECTIVE:  Chief Complaint   Patient presents with    Gait Problem     Left knee replacement on tuesday; having trouble getting around at home; denies any injuries; wants to have rehab placement      Patient is tolerating medications. No reported adverse drug reactions.  No nausea, vomiting, diarrhea.    Review of systems:  As stated above in the chief complaint, otherwise negative.    Medications:  Scheduled Meds:   ceFEPIme  2 g IntraVENous Q12H    And    sterile water  20 mL Injection Q12H    white petrolatum   Topical BID    vancomycin  1,500 mg IntraVENous Q24H    amiodarone  200 mg Oral QAM    apixaban  2.5 mg Oral BID    atorvastatin  40 mg Oral Daily    donepezil  10 mg Oral Nightly    [Held by provider] furosemide  40 mg Oral Daily    metoprolol succinate  50 mg Oral QAM    tamsulosin  0.4 mg Oral QAM    ipratropium  0.5 mg Nebulization Q6H RT    sodium chloride flush  5-40 mL IntraVENous 2 times per day    polyethylene glycol  17 g Oral Daily    sennosides-docusate sodium  2 tablet Oral Daily    arformoterol tartrate  15 mcg Nebulization BID RT    insulin lispro  0-4 Units SubCUTAneous 4x Daily AC & HS     Continuous Infusions:   sodium chloride      dextrose       PRN Meds:sodium chloride flush, sodium chloride, ondansetron **OR** ondansetron, acetaminophen **OR** acetaminophen, albuterol, oxyCODONE-acetaminophen, glucose, dextrose bolus **OR** dextrose bolus, glucagon (rDNA), dextrose    OBJECTIVE:  BP (!) 114/50   Pulse 77   Temp 97.9 °F (36.6 °C) (Oral)   Resp 16   Ht 1.803 m (5' 11\")   Wt 116.1 kg (256 lb)   SpO2 99%   BMI 35.70 kg/m²   Temp  Av.9 °F (37.2 °C)  Min: 97.9 °F (36.6 °C)  Max: 101.2 °F (38.4 °C)  Constitutional: The patient is awake, alert, and oriented.  Up in the chair  Skin: Warm and dry. No rashes were noted.   HEENT: Round and reactive pupils.  Moist mucous membranes.  No ulcerations or  Please send letter, then close the encounter.

## 2024-11-02 NOTE — PROGRESS NOTES
Date: 11/2/2024    Time: 0100    Patient Placed On BIPAP/CPAP/ Non-Invasive Ventilation?  Patient continues on bipap    If no must comment.  Facial area red/color change? No           If YES are Blister/Lesion present?No   If yes must notify nursing staff  BIPAP/CPAP skin barrier?  Yes    Skin barrier type:mepilexlite       Comments:        Francisca Kaur RCP

## 2024-11-03 ENCOUNTER — APPOINTMENT (OUTPATIENT)
Dept: GENERAL RADIOLOGY | Age: 79
End: 2024-11-03
Payer: MEDICARE

## 2024-11-03 LAB
ALBUMIN SERPL-MCNC: 2.5 G/DL (ref 3.5–5.2)
ALP SERPL-CCNC: 61 U/L (ref 40–129)
ALT SERPL-CCNC: 9 U/L (ref 0–40)
ANION GAP SERPL CALCULATED.3IONS-SCNC: 9 MMOL/L (ref 7–16)
AST SERPL-CCNC: 22 U/L (ref 0–39)
BASOPHILS # BLD: 0.04 K/UL (ref 0–0.2)
BASOPHILS NFR BLD: 0 % (ref 0–2)
BILIRUB SERPL-MCNC: 0.5 MG/DL (ref 0–1.2)
BUN SERPL-MCNC: 43 MG/DL (ref 6–23)
CALCIUM SERPL-MCNC: 8.2 MG/DL (ref 8.6–10.2)
CHLORIDE SERPL-SCNC: 100 MMOL/L (ref 98–107)
CO2 SERPL-SCNC: 20 MMOL/L (ref 22–29)
CREAT SERPL-MCNC: 2.3 MG/DL (ref 0.7–1.2)
EOSINOPHIL # BLD: 0.19 K/UL (ref 0.05–0.5)
EOSINOPHILS RELATIVE PERCENT: 1 % (ref 0–6)
ERYTHROCYTE [DISTWIDTH] IN BLOOD BY AUTOMATED COUNT: 13 % (ref 11.5–15)
GFR, ESTIMATED: 28 ML/MIN/1.73M2
GLUCOSE BLD-MCNC: 177 MG/DL (ref 74–99)
GLUCOSE BLD-MCNC: 228 MG/DL (ref 74–99)
GLUCOSE BLD-MCNC: 242 MG/DL (ref 74–99)
GLUCOSE BLD-MCNC: 245 MG/DL (ref 74–99)
GLUCOSE SERPL-MCNC: 165 MG/DL (ref 74–99)
HBA1C MFR BLD: 8.2 % (ref 4–5.6)
HCT VFR BLD AUTO: 28.1 % (ref 37–54)
HGB BLD-MCNC: 9.1 G/DL (ref 12.5–16.5)
IMM GRANULOCYTES # BLD AUTO: 0.11 K/UL (ref 0–0.58)
IMM GRANULOCYTES NFR BLD: 1 % (ref 0–5)
LYMPHOCYTES NFR BLD: 1.56 K/UL (ref 1.5–4)
LYMPHOCYTES RELATIVE PERCENT: 10 % (ref 20–42)
MCH RBC QN AUTO: 31.6 PG (ref 26–35)
MCHC RBC AUTO-ENTMCNC: 32.4 G/DL (ref 32–34.5)
MCV RBC AUTO: 97.6 FL (ref 80–99.9)
MICROORGANISM SPEC CULT: NORMAL
MONOCYTES NFR BLD: 1.2 K/UL (ref 0.1–0.95)
MONOCYTES NFR BLD: 8 % (ref 2–12)
NEUTROPHILS NFR BLD: 79 % (ref 43–80)
NEUTS SEG NFR BLD: 11.85 K/UL (ref 1.8–7.3)
PLATELET # BLD AUTO: 170 K/UL (ref 130–450)
PMV BLD AUTO: 10.7 FL (ref 7–12)
POTASSIUM SERPL-SCNC: 4.1 MMOL/L (ref 3.5–5)
PROT SERPL-MCNC: 5.6 G/DL (ref 6.4–8.3)
RBC # BLD AUTO: 2.88 M/UL (ref 3.8–5.8)
SERVICE CMNT-IMP: NORMAL
SODIUM SERPL-SCNC: 129 MMOL/L (ref 132–146)
SPECIMEN DESCRIPTION: NORMAL
VANCOMYCIN SERPL-MCNC: 26.6 UG/ML (ref 5–40)
WBC OTHER # BLD: 15 K/UL (ref 4.5–11.5)

## 2024-11-03 PROCEDURE — 99232 SBSQ HOSP IP/OBS MODERATE 35: CPT | Performed by: STUDENT IN AN ORGANIZED HEALTH CARE EDUCATION/TRAINING PROGRAM

## 2024-11-03 PROCEDURE — 6370000000 HC RX 637 (ALT 250 FOR IP)

## 2024-11-03 PROCEDURE — 82962 GLUCOSE BLOOD TEST: CPT

## 2024-11-03 PROCEDURE — 83036 HEMOGLOBIN GLYCOSYLATED A1C: CPT

## 2024-11-03 PROCEDURE — 94640 AIRWAY INHALATION TREATMENT: CPT

## 2024-11-03 PROCEDURE — 1200000000 HC SEMI PRIVATE

## 2024-11-03 PROCEDURE — 6360000002 HC RX W HCPCS

## 2024-11-03 PROCEDURE — 94660 CPAP INITIATION&MGMT: CPT

## 2024-11-03 PROCEDURE — 2580000003 HC RX 258

## 2024-11-03 PROCEDURE — 80053 COMPREHEN METABOLIC PANEL: CPT

## 2024-11-03 PROCEDURE — 71045 X-RAY EXAM CHEST 1 VIEW: CPT

## 2024-11-03 PROCEDURE — 2580000003 HC RX 258: Performed by: INTERNAL MEDICINE

## 2024-11-03 PROCEDURE — 97116 GAIT TRAINING THERAPY: CPT

## 2024-11-03 PROCEDURE — 97530 THERAPEUTIC ACTIVITIES: CPT

## 2024-11-03 PROCEDURE — 85025 COMPLETE CBC W/AUTO DIFF WBC: CPT

## 2024-11-03 PROCEDURE — 80202 ASSAY OF VANCOMYCIN: CPT

## 2024-11-03 PROCEDURE — 6360000002 HC RX W HCPCS: Performed by: INTERNAL MEDICINE

## 2024-11-03 RX ORDER — INSULIN GLARGINE 100 [IU]/ML
10 INJECTION, SOLUTION SUBCUTANEOUS DAILY
Status: DISCONTINUED | OUTPATIENT
Start: 2024-11-03 | End: 2024-11-06

## 2024-11-03 RX ADMIN — IPRATROPIUM BROMIDE 0.5 MG: 0.5 SOLUTION RESPIRATORY (INHALATION) at 09:41

## 2024-11-03 RX ADMIN — AMIODARONE HYDROCHLORIDE 200 MG: 200 TABLET ORAL at 09:27

## 2024-11-03 RX ADMIN — ARFORMOTEROL TARTRATE 15 MCG: 15 SOLUTION RESPIRATORY (INHALATION) at 20:12

## 2024-11-03 RX ADMIN — DOCUSATE SODIUM 50 MG AND SENNOSIDES 8.6 MG 2 TABLET: 8.6; 5 TABLET, FILM COATED ORAL at 09:27

## 2024-11-03 RX ADMIN — APIXABAN 2.5 MG: 2.5 TABLET, FILM COATED ORAL at 21:36

## 2024-11-03 RX ADMIN — INSULIN GLARGINE 10 UNITS: 100 INJECTION, SOLUTION SUBCUTANEOUS at 09:29

## 2024-11-03 RX ADMIN — APIXABAN 2.5 MG: 2.5 TABLET, FILM COATED ORAL at 09:27

## 2024-11-03 RX ADMIN — INSULIN LISPRO 2 UNITS: 100 INJECTION, SOLUTION INTRAVENOUS; SUBCUTANEOUS at 21:33

## 2024-11-03 RX ADMIN — PETROLATUM: 420 OINTMENT TOPICAL at 09:30

## 2024-11-03 RX ADMIN — INSULIN LISPRO 2 UNITS: 100 INJECTION, SOLUTION INTRAVENOUS; SUBCUTANEOUS at 17:27

## 2024-11-03 RX ADMIN — ARFORMOTEROL TARTRATE 15 MCG: 15 SOLUTION RESPIRATORY (INHALATION) at 09:41

## 2024-11-03 RX ADMIN — METOPROLOL SUCCINATE 50 MG: 50 TABLET, EXTENDED RELEASE ORAL at 09:27

## 2024-11-03 RX ADMIN — TAMSULOSIN HYDROCHLORIDE 0.4 MG: 0.4 CAPSULE ORAL at 09:27

## 2024-11-03 RX ADMIN — OXYCODONE HYDROCHLORIDE AND ACETAMINOPHEN 1 TABLET: 5; 325 TABLET ORAL at 16:06

## 2024-11-03 RX ADMIN — WATER 20 ML: 1 INJECTION INTRAMUSCULAR; INTRAVENOUS; SUBCUTANEOUS at 01:43

## 2024-11-03 RX ADMIN — WATER 2000 MG: 1 INJECTION INTRAMUSCULAR; INTRAVENOUS; SUBCUTANEOUS at 01:32

## 2024-11-03 RX ADMIN — SODIUM CHLORIDE: 9 INJECTION, SOLUTION INTRAVENOUS at 13:46

## 2024-11-03 RX ADMIN — INSULIN LISPRO 2 UNITS: 100 INJECTION, SOLUTION INTRAVENOUS; SUBCUTANEOUS at 13:04

## 2024-11-03 RX ADMIN — IPRATROPIUM BROMIDE 0.5 MG: 0.5 SOLUTION RESPIRATORY (INHALATION) at 20:12

## 2024-11-03 RX ADMIN — WATER 2000 MG: 1 INJECTION INTRAMUSCULAR; INTRAVENOUS; SUBCUTANEOUS at 13:30

## 2024-11-03 RX ADMIN — ATORVASTATIN CALCIUM 40 MG: 40 TABLET, FILM COATED ORAL at 09:27

## 2024-11-03 RX ADMIN — DONEPEZIL HYDROCHLORIDE 10 MG: 10 TABLET, FILM COATED ORAL at 21:35

## 2024-11-03 ASSESSMENT — PAIN DESCRIPTION - LOCATION: LOCATION: BACK;KNEE

## 2024-11-03 ASSESSMENT — PAIN DESCRIPTION - DESCRIPTORS: DESCRIPTORS: ACHING;SORE;PRESSURE

## 2024-11-03 ASSESSMENT — PAIN SCALES - GENERAL: PAINLEVEL_OUTOF10: 8

## 2024-11-03 NOTE — PROGRESS NOTES
Daily Treat       Evaluating PT:  Ben Rivas PT     Room #:  0732/0732-A  Diagnosis:  Hx LT TKA 10/29/24, Diff walking   Pertinent PMHx/PSHx:  See PMH  Procedure/Surgery:  LT TKA 10/29/24  home and back to hospital   Precautions:  Falls, WBAT  Equipment Needs:  WC for indep mobility and WW with assistance x 2     SUBJECTIVE:     Pt lives with Dtr in a 1 story home with 1 mini stairs and 0 rail to enter.    Pt ambulated with SC prior to LT TKA PTA.  Reports fall history prior to LT TKA using SC.     OBJECTIVE:    Initial Evaluation  Date: 11/2/24 Treatment  11/3/24 Short Term/ Long Term   Goals   Was pt agreeable to Eval/treatment? Yes Yes      Does pt have pain? LT knee LT knee      Bed Mobility   NA-in chair on arrival.  Aids report Mod/Max x 2   Sup-sit: Mod trunk and LE's Mod assistance or <   Transfers Sit to stand toilet: Max x 2  Stand to sit: Min/Mod  Stand pivot:  Bed-stand: Mod x 2   Stand-toilet: CGA/Min   Toilet-stand: Mod x 2   Stand-sit: CGA/Min Mod x 2   Ambulation   10 feet with WW Min/close CGA x 2 AAT, moderate/high fall risks  Bed-mid doorway then to toilet 15'.  Amb post RR use x 100' WW close CGA x 2.  25-40 feet with WW Min x 2    Stair negotiation: ascended and descended  NA  NT Rehab to address   ROM LT knee -10*-90*  LT knee -10*-90* LT Knee   MMT Limited efforts/unreliable, 3/5 via AROM   BLE's 1 gr   AM-PAC 6 Clicks 13/24  15/24      Therapeutic Exercises:      ASSESSMENT:    Comments:  RN x 2 assisting with care as difficult prior day.  Bed mobility improved vs prior day with continued Mod x 2 assist to EOB.  Still high difficulty moving his LTLE to edge bed with a max/high mod assist to guide EOB.  No post bed mobility dizziness.  Transfer to stand was a high mod x 2 assist w/o post dizziness.  Amb with WW Min/CGA close to partial doorway with need to use RR. Walk to toilet was a slower reciprocal pattern with high reliance WW for support and unloading knee. Stand-toilet was difficult

## 2024-11-03 NOTE — PROGRESS NOTES
Pharmacy Consultation Note  (Antibiotic Dosing and Monitoring)    Initial consult date: 11/1/2024  Consulting physician/provider: Mason Reynoso MD   Drug: Vancomycin  Indication: Bacteremia/Bone and Joint    Age/  Gender Height Weight IBW  Allergy Information   79 y.o./male 180.3 cm (5' 11\") 116.1 kg (256 lb)     Ideal body weight: 75.3 kg (166 lb 0.1 oz)  Adjusted ideal body weight: 91.6 kg (202 lb 0.1 oz)   Patient has no known allergies.      Renal Function:  Recent Labs     11/01/24  0046 11/02/24  0400 11/03/24  0250   BUN 26* 33* 43*   CREATININE 1.4* 1.9* 2.3*       Intake/Output Summary (Last 24 hours) at 11/3/2024 0931  Last data filed at 11/3/2024 0620  Gross per 24 hour   Intake --   Output 850 ml   Net -850 ml       Vancomycin Monitoring:  Trough:  No results for input(s): \"VANCOTROUGH\" in the last 72 hours.  Random:  No results for input(s): \"VANCORANDOM\" in the last 72 hours.    Vancomycin Administration Times:  Recent vancomycin administrations                     vancomycin (VANCOCIN) 1,500 mg in sodium chloride 0.9 % 250 mL IVPB (mg) 1,500 mg New Bag 11/02/24 2107     1,500 mg New Bag 11/01/24 2148    vancomycin (VANCOCIN) 2,500 mg in sodium chloride 0.9 % 500 mL IVPB (mg) 2,500 mg New Bag 11/01/24 0240             Assessment:  Patient is a 79 y.o. male who has been initiated on vancomycin.  Estimated Creatinine Clearance: 33 mL/min (A) (based on SCr of 2.3 mg/dL (H)).  To dose vancomycin, pharmacy will be utilizing  dose by trough level due to rapidly changing creatinine. Goal vancomycin trough 15-20 mcg/mL.   Baseline creatinine ~1.4 mg/dL. New ROSEANN; Scr 1.4 --> 1.9 --> 2.3.   Did receive CT w/ contrast on 11/1.     Plan:  Hold vancomycin tonight. Will check repeat random level with AM labs tomorrow and re-evaluate dosing plan.  Will continue to monitor renal function.  Pharmacy to follow.    Electronically signed by Kerri Ramírez RPH, Pharm.D. 11/3/2024 9:31 AM   Ext: [id number]    SEB:

## 2024-11-03 NOTE — PROGRESS NOTES
[facility]  Progress Note    Chief Complaint   Patient presents with    Gait Problem     Left knee replacement on tuesday; having trouble getting around at home; denies any injuries; wants to have rehab placement          Subjective:    Mason Gudino was seen this morning lying in bed.  States that his left knee pain is tolerable.  Denies any recent fevers.  Last documented fever was 101.2 on 11/2 at 0400. He was coughing during encounter.  Bowel movement documented overnight          Review of Systems   All other systems reviewed and are negative.        Objective:    /67   Pulse 68   Temp 99.2 °F (37.3 °C) (Oral)   Resp 27   Ht 1.803 m (5' 11\")   Wt 116.1 kg (256 lb)   SpO2 95%   BMI 35.70 kg/m²       Physical Exam  Constitutional:       General: He is not in acute distress.     Appearance: He is ill-appearing. He is not toxic-appearing or diaphoretic.   HENT:      Head: Normocephalic and atraumatic.   Eyes:      General: No scleral icterus.  Cardiovascular:      Rate and Rhythm: Normal rate and regular rhythm.      Heart sounds: No murmur heard.  Pulmonary:      Effort: No respiratory distress.      Breath sounds: Rhonchi present. No wheezing or rales.   Abdominal:      General: There is no distension.      Palpations: Abdomen is soft.      Tenderness: There is no abdominal tenderness. There is no guarding.   Musculoskeletal:      Comments: Left knee with Mepilex dressing clean dry and intact.  No redness.  Same temperatures opposite knee.  It is significantly swollen most likely from recent surgery   Neurological:      Mental Status: He is alert.           CBC with Differential:    Lab Results   Component Value Date/Time    WBC 15.0 11/03/2024 02:50 AM    RBC 2.88 11/03/2024 02:50 AM    HGB 9.1 11/03/2024 02:50 AM    HCT 28.1 11/03/2024 02:50 AM     11/03/2024 02:50 AM    MCV 97.6 11/03/2024 02:50 AM    MCH 31.6 11/03/2024 02:50 AM    MCHC 32.4
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
## 2024-11-03 NOTE — PROGRESS NOTES
[facility]  [facility]  Progress Note    SUBJECTIVE:  Chief Complaint   Patient presents with    Gait Problem     Left knee replacement on tuesday; having trouble getting around at home; denies any injuries; wants to have rehab placement      Patient is tolerating medications. No reported adverse drug reactions.  No nausea, vomiting, diarrhea.    Review of systems:  As stated above in the chief complaint, otherwise negative.    Medications:  Scheduled Meds:   insulin glargine  10 Units SubCUTAneous Daily    sterile water  20 mL Injection Q12H    ceFEPIme (MAXIPIME) 2,000 mg in sterile water 20 mL IV syringe  2,000 mg IntraVENous Q12H    insulin lispro  0-8 Units SubCUTAneous 4x Daily AC & HS    white petrolatum   Topical BID    vancomycin  1,500 mg IntraVENous Q24H    amiodarone  200 mg Oral QAM    apixaban  2.5 mg Oral BID    atorvastatin  40 mg Oral Daily    donepezil  10 mg Oral Nightly    [Held by provider] furosemide  40 mg Oral Daily    metoprolol succinate  50 mg Oral QAM    tamsulosin  0.4 mg Oral QAM    ipratropium  0.5 mg Nebulization Q6H RT    sodium chloride flush  5-40 mL IntraVENous 2 times per day    polyethylene glycol  17 g Oral Daily    sennosides-docusate sodium  2 tablet Oral Daily    arformoterol tartrate  15 mcg Nebulization BID RT     Continuous Infusions:   sodium chloride 125 mL/hr at 24 2217    sodium chloride      dextrose       PRN Meds:sodium chloride flush, sodium chloride, ondansetron **OR** ondansetron, acetaminophen **OR** acetaminophen, albuterol, oxyCODONE-acetaminophen, glucose, dextrose bolus **OR** dextrose bolus, glucagon (rDNA), dextrose    OBJECTIVE:  BP (!) 119/57   Pulse 75   Temp 98.2 °F (36.8 °C) (Oral)   Resp 17   Ht 1.803 m (5' 11\")   Wt 116.1 kg (256 lb)   SpO2 95%   BMI 35.70 kg/m²   Temp  Av.7 °F (37.1 °C)  Min: 97.9 °F (36.6 °C)  Max: 99.2 °F (37.3 °C)  Constitutional: The patient is awake, alert, and oriented.  Up

## 2024-11-03 NOTE — PROGRESS NOTES
Date: 11/2/2024    Time: 11:14 PM    Patient Placed On BIPAP/CPAP/ Non-Invasive Ventilation?  Yes    If no must comment.  Facial area red/color change? No           If YES are Blister/Lesion present?No   If yes must notify nursing staff  BIPAP/CPAP skin barrier?  Yes    Skin barrier type:mepilexlite       Comments:        Belkis Roth RCP

## 2024-11-03 NOTE — PLAN OF CARE
Problem: ABCDS Injury Assessment  Goal: Absence of physical injury  Outcome: Progressing     Problem: Skin/Tissue Integrity  Goal: Absence of new skin breakdown  Description: 1.  Monitor for areas of redness and/or skin breakdown  2.  Assess vascular access sites hourly  3.  Every 4-6 hours minimum:  Change oxygen saturation probe site  4.  Every 4-6 hours:  If on nasal continuous positive airway pressure, respiratory therapy assess nares and determine need for appliance change or resting period.  Outcome: Progressing     Problem: Discharge Planning  Goal: Discharge to home or other facility with appropriate resources  Outcome: Progressing     Problem: Chronic Conditions and Co-morbidities  Goal: Patient's chronic conditions and co-morbidity symptoms are monitored and maintained or improved  Outcome: Progressing     Problem: Safety - Adult  Goal: Free from fall injury  Outcome: Progressing

## 2024-11-03 NOTE — PROGRESS NOTES
RN message out to Dr. Vale re: report that urine culture can NOT be added onto first urine collected as specimen is contaminated

## 2024-11-04 ENCOUNTER — APPOINTMENT (OUTPATIENT)
Dept: ULTRASOUND IMAGING | Age: 79
End: 2024-11-04
Payer: MEDICARE

## 2024-11-04 ENCOUNTER — APPOINTMENT (OUTPATIENT)
Dept: GENERAL RADIOLOGY | Age: 79
End: 2024-11-04
Payer: MEDICARE

## 2024-11-04 PROBLEM — R50.9 FEVER: Status: ACTIVE | Noted: 2024-11-04

## 2024-11-04 PROBLEM — M79.89 LEG SWELLING: Status: ACTIVE | Noted: 2024-11-04

## 2024-11-04 LAB
ALBUMIN SERPL-MCNC: 2.3 G/DL (ref 3.5–5.2)
ALP SERPL-CCNC: 73 U/L (ref 40–129)
ALT SERPL-CCNC: 33 U/L (ref 0–40)
ANION GAP SERPL CALCULATED.3IONS-SCNC: 12 MMOL/L (ref 7–16)
AST SERPL-CCNC: 59 U/L (ref 0–39)
BASOPHILS # BLD: 0.04 K/UL (ref 0–0.2)
BASOPHILS NFR BLD: 0 % (ref 0–2)
BILIRUB SERPL-MCNC: 0.5 MG/DL (ref 0–1.2)
BUN SERPL-MCNC: 45 MG/DL (ref 6–23)
CALCIUM SERPL-MCNC: 8.1 MG/DL (ref 8.6–10.2)
CHLORIDE SERPL-SCNC: 101 MMOL/L (ref 98–107)
CO2 SERPL-SCNC: 18 MMOL/L (ref 22–29)
CREAT SERPL-MCNC: 2 MG/DL (ref 0.7–1.2)
EOSINOPHIL # BLD: 0.46 K/UL (ref 0.05–0.5)
EOSINOPHILS RELATIVE PERCENT: 4 % (ref 0–6)
ERYTHROCYTE [DISTWIDTH] IN BLOOD BY AUTOMATED COUNT: 13 % (ref 11.5–15)
GFR, ESTIMATED: 33 ML/MIN/1.73M2
GLUCOSE BLD-MCNC: 171 MG/DL (ref 74–99)
GLUCOSE BLD-MCNC: 185 MG/DL (ref 74–99)
GLUCOSE BLD-MCNC: 210 MG/DL (ref 74–99)
GLUCOSE BLD-MCNC: 212 MG/DL (ref 74–99)
GLUCOSE SERPL-MCNC: 157 MG/DL (ref 74–99)
HCT VFR BLD AUTO: 28.9 % (ref 37–54)
HGB BLD-MCNC: 9.5 G/DL (ref 12.5–16.5)
IMM GRANULOCYTES # BLD AUTO: 0.16 K/UL (ref 0–0.58)
IMM GRANULOCYTES NFR BLD: 1 % (ref 0–5)
LYMPHOCYTES NFR BLD: 1.29 K/UL (ref 1.5–4)
LYMPHOCYTES RELATIVE PERCENT: 10 % (ref 20–42)
MCH RBC QN AUTO: 32.1 PG (ref 26–35)
MCHC RBC AUTO-ENTMCNC: 32.9 G/DL (ref 32–34.5)
MCV RBC AUTO: 97.6 FL (ref 80–99.9)
MONOCYTES NFR BLD: 1.11 K/UL (ref 0.1–0.95)
MONOCYTES NFR BLD: 9 % (ref 2–12)
NEUTROPHILS NFR BLD: 76 % (ref 43–80)
NEUTS SEG NFR BLD: 9.69 K/UL (ref 1.8–7.3)
PLATELET # BLD AUTO: 198 K/UL (ref 130–450)
PMV BLD AUTO: 10.9 FL (ref 7–12)
POTASSIUM SERPL-SCNC: 4.1 MMOL/L (ref 3.5–5)
PROT SERPL-MCNC: 5.6 G/DL (ref 6.4–8.3)
RBC # BLD AUTO: 2.96 M/UL (ref 3.8–5.8)
SODIUM SERPL-SCNC: 131 MMOL/L (ref 132–146)
VANCOMYCIN SERPL-MCNC: 14.8 UG/ML (ref 5–40)
WBC OTHER # BLD: 12.8 K/UL (ref 4.5–11.5)

## 2024-11-04 PROCEDURE — 93971 EXTREMITY STUDY: CPT

## 2024-11-04 PROCEDURE — 82962 GLUCOSE BLOOD TEST: CPT

## 2024-11-04 PROCEDURE — 80202 ASSAY OF VANCOMYCIN: CPT

## 2024-11-04 PROCEDURE — 94660 CPAP INITIATION&MGMT: CPT

## 2024-11-04 PROCEDURE — 94640 AIRWAY INHALATION TREATMENT: CPT

## 2024-11-04 PROCEDURE — 97110 THERAPEUTIC EXERCISES: CPT

## 2024-11-04 PROCEDURE — 2580000003 HC RX 258: Performed by: INTERNAL MEDICINE

## 2024-11-04 PROCEDURE — 85025 COMPLETE CBC W/AUTO DIFF WBC: CPT

## 2024-11-04 PROCEDURE — 6370000000 HC RX 637 (ALT 250 FOR IP)

## 2024-11-04 PROCEDURE — 6360000002 HC RX W HCPCS

## 2024-11-04 PROCEDURE — 97535 SELF CARE MNGMENT TRAINING: CPT

## 2024-11-04 PROCEDURE — 2580000003 HC RX 258

## 2024-11-04 PROCEDURE — 86335 IMMUNFIX E-PHORSIS/URINE/CSF: CPT

## 2024-11-04 PROCEDURE — 97530 THERAPEUTIC ACTIVITIES: CPT

## 2024-11-04 PROCEDURE — 80053 COMPREHEN METABOLIC PANEL: CPT

## 2024-11-04 PROCEDURE — 1200000000 HC SEMI PRIVATE

## 2024-11-04 PROCEDURE — 99232 SBSQ HOSP IP/OBS MODERATE 35: CPT | Performed by: FAMILY MEDICINE

## 2024-11-04 PROCEDURE — 6360000002 HC RX W HCPCS: Performed by: INTERNAL MEDICINE

## 2024-11-04 PROCEDURE — 51798 US URINE CAPACITY MEASURE: CPT

## 2024-11-04 PROCEDURE — 84156 ASSAY OF PROTEIN URINE: CPT

## 2024-11-04 PROCEDURE — 74018 RADEX ABDOMEN 1 VIEW: CPT

## 2024-11-04 PROCEDURE — 84166 PROTEIN E-PHORESIS/URINE/CSF: CPT

## 2024-11-04 RX ORDER — SENNA AND DOCUSATE SODIUM 50; 8.6 MG/1; MG/1
2 TABLET, FILM COATED ORAL DAILY PRN
Status: DISCONTINUED | OUTPATIENT
Start: 2024-11-04 | End: 2024-11-07 | Stop reason: HOSPADM

## 2024-11-04 RX ORDER — ALBUTEROL SULFATE 0.83 MG/ML
2.5 SOLUTION RESPIRATORY (INHALATION) ONCE
Status: COMPLETED | OUTPATIENT
Start: 2024-11-04 | End: 2024-11-04

## 2024-11-04 RX ORDER — BUDESONIDE 0.5 MG/2ML
0.5 INHALANT ORAL
Status: DISCONTINUED | OUTPATIENT
Start: 2024-11-04 | End: 2024-11-07 | Stop reason: HOSPADM

## 2024-11-04 RX ORDER — POLYETHYLENE GLYCOL 3350 17 G/17G
17 POWDER, FOR SOLUTION ORAL DAILY PRN
Status: DISCONTINUED | OUTPATIENT
Start: 2024-11-04 | End: 2024-11-07 | Stop reason: HOSPADM

## 2024-11-04 RX ADMIN — ARFORMOTEROL TARTRATE 15 MCG: 15 SOLUTION RESPIRATORY (INHALATION) at 21:10

## 2024-11-04 RX ADMIN — IPRATROPIUM BROMIDE 0.5 MG: 0.5 SOLUTION RESPIRATORY (INHALATION) at 21:10

## 2024-11-04 RX ADMIN — AMIODARONE HYDROCHLORIDE 200 MG: 200 TABLET ORAL at 09:18

## 2024-11-04 RX ADMIN — WATER 2000 MG: 1 INJECTION INTRAMUSCULAR; INTRAVENOUS; SUBCUTANEOUS at 01:06

## 2024-11-04 RX ADMIN — PETROLATUM: 420 OINTMENT TOPICAL at 09:18

## 2024-11-04 RX ADMIN — IPRATROPIUM BROMIDE 0.5 MG: 0.5 SOLUTION RESPIRATORY (INHALATION) at 03:47

## 2024-11-04 RX ADMIN — INSULIN LISPRO 2 UNITS: 100 INJECTION, SOLUTION INTRAVENOUS; SUBCUTANEOUS at 06:20

## 2024-11-04 RX ADMIN — DONEPEZIL HYDROCHLORIDE 10 MG: 10 TABLET, FILM COATED ORAL at 21:32

## 2024-11-04 RX ADMIN — SODIUM CHLORIDE, PRESERVATIVE FREE 10 ML: 5 INJECTION INTRAVENOUS at 09:19

## 2024-11-04 RX ADMIN — INSULIN LISPRO 2 UNITS: 100 INJECTION, SOLUTION INTRAVENOUS; SUBCUTANEOUS at 16:51

## 2024-11-04 RX ADMIN — PETROLATUM: 420 OINTMENT TOPICAL at 04:14

## 2024-11-04 RX ADMIN — IPRATROPIUM BROMIDE 0.5 MG: 0.5 SOLUTION RESPIRATORY (INHALATION) at 12:42

## 2024-11-04 RX ADMIN — WATER 2000 MG: 1 INJECTION INTRAMUSCULAR; INTRAVENOUS; SUBCUTANEOUS at 13:50

## 2024-11-04 RX ADMIN — TAMSULOSIN HYDROCHLORIDE 0.4 MG: 0.4 CAPSULE ORAL at 09:18

## 2024-11-04 RX ADMIN — INSULIN GLARGINE 10 UNITS: 100 INJECTION, SOLUTION SUBCUTANEOUS at 09:18

## 2024-11-04 RX ADMIN — APIXABAN 2.5 MG: 2.5 TABLET, FILM COATED ORAL at 21:32

## 2024-11-04 RX ADMIN — APIXABAN 2.5 MG: 2.5 TABLET, FILM COATED ORAL at 09:18

## 2024-11-04 RX ADMIN — WATER 20 ML: 1 INJECTION INTRAMUSCULAR; INTRAVENOUS; SUBCUTANEOUS at 01:41

## 2024-11-04 RX ADMIN — ATORVASTATIN CALCIUM 40 MG: 40 TABLET, FILM COATED ORAL at 09:18

## 2024-11-04 RX ADMIN — ALBUTEROL SULFATE 2.5 MG: 2.5 SOLUTION RESPIRATORY (INHALATION) at 12:42

## 2024-11-04 RX ADMIN — PETROLATUM: 420 OINTMENT TOPICAL at 21:32

## 2024-11-04 RX ADMIN — BUDESONIDE 500 MCG: 0.5 SUSPENSION RESPIRATORY (INHALATION) at 12:42

## 2024-11-04 RX ADMIN — SODIUM CHLORIDE, PRESERVATIVE FREE 10 ML: 5 INJECTION INTRAVENOUS at 01:14

## 2024-11-04 RX ADMIN — METOPROLOL SUCCINATE 50 MG: 50 TABLET, EXTENDED RELEASE ORAL at 09:18

## 2024-11-04 RX ADMIN — INSULIN LISPRO 2 UNITS: 100 INJECTION, SOLUTION INTRAVENOUS; SUBCUTANEOUS at 11:18

## 2024-11-04 RX ADMIN — BUDESONIDE 500 MCG: 0.5 SUSPENSION RESPIRATORY (INHALATION) at 21:10

## 2024-11-04 ASSESSMENT — ENCOUNTER SYMPTOMS
COUGH: 1
CHEST TIGHTNESS: 0
ABDOMINAL PAIN: 0
BLOOD IN STOOL: 0
VOMITING: 0
NAUSEA: 0
DIARRHEA: 0
SHORTNESS OF BREATH: 0
CONSTIPATION: 0
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
## 2024-11-04 NOTE — PROGRESS NOTES
The patient was DTV between 6:30-8:30 PM and was unable to do so. We bladder scanned him and notified Dr. Holland who ordered a lyons placed.
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
## 2024-11-04 NOTE — PROGRESS NOTES
up to 101.2F on 11/2 0400, was given Tylenol   -Continue cefepime and Vanco for now per ID  -Blood cultures: no growth (day 3)  -CTA (11/1): No acute process noted.  Hiatal hernia with likely ingested contents.  -Orthopedic surgery consulted (11/2), appreciate recs:  No ABX necessary for TKA  -ID following, appreciate recs (11/2): Continue cefepime, 2 g every 12 hours (day 4) and vancomycin (stopped 11/3/24) per pharmacy recommendations.  Follow blood cultures.  Local wound care.  -Recheck CXR 11/4 WNL, no abnormalities     Hyponatremia- improving   Patient with hyponatremia of around sodium of 129 on 11/3/2024, nephrology consulted, sodium improved to 131 on 11/4/24  -Nephrology believes this is in the setting of ROSEANN, started on IV fluids normal saline 125 cc an hour    LUE Swelling, edema  - Ultrasound ordered, may be 2/2 IVF  -Of note patient's home Lasix is held currently due to an ROSEANN, patient typically takes 40 mg orally daily     Constipation, much improved  He states his last bowel movement was 4 to 5 days ago.  Now on opioids due to knee replacement  -Change GlycoLax to as needed as patient is having more frequent bowel movements  -Change Senokot-S 2 tablets daily to as needed as patient is having more frequent bowel movements  -Positive BM this AM 11/4/24, patient has had roughly 3-4 bowel movements in the past 2 days     Atrial fibrillation  -Continue home Eliquis 2.5 mg twice daily  -Continue home amiodarone 200 mg daily  -Continue home Toprol XL, 50 mg daily  -Holding home Lasix 40 mg daily     Coronary artery disease  -Continue home Lipitor 40 mg daily     Urinary retention  -Continue home Flomax 0.4 mg daily  -Yen placed for high PVR      COPD  -Home Spiriva exchanged for Atrovent every 6 hours nebulizer  -Brovana nebulizer twice daily  -Albuterol nebulizer every 6 hours as needed     T2DM  Last A1c 7.2 % 9 months ago  -Low-dose sliding scale insulin  -10 u Glargine   --------6 U SSI needed over

## 2024-11-04 NOTE — PROGRESS NOTES
Therapeutic activity to improve functional performance during ADLs.                                         Therapeutic exercise to improve tolerance and functional strength for ADLs    Balance retraining/tolerance tasks for facilitation of postural control with dynamic challenges during ADLs .      Positioning to improve functional independence        Recommended Adaptive Equipment: continue to assess       Home Living: Pt lives with daughter,  1 story with steps to enter   Bathroom setup: walk in shower, shower chair    Equipment owned: walker, cane      Prior Level of Function: since surgery assist  with LE ADLs , and  with IADLs; ambulated with walker      Pain Level: LE pain   Cognition: Awake and alert.  Cues for safety.                 Functional Assessment:  AM-PAC Daily Activity Raw Score: 15/24    Initial Eval Status  Date: 11/1/2024 Treatment Status  Date: 11/4/24  STGs = LTGs  Time frame: 10-14 days   Feeding Independent Setup       Grooming SBA/set-up ,seated   Decrease standing balance  SBA seated.  Limited tolerance for standing during AdL.  Supervision    UB Dressing Min A  Don/[facility] gown    Supervision    LB Dressing Max A  Not able to reach feet, L LE swelling, assist with standing balance  Mod A     SBA    Bathing Mod A    SBA    Toileting Min A  Max A cinthia hygiene.  Pt incontinent of bowel in the bed.   Catheter for urine.     Supervision    Bed Mobility  Mod A  Supine to sit  Max A supine to sit   Supervision    Functional Transfers Mod A  Sit stand from bed     Patient fearful of falling and legs giving out   min A sit to stand from bed surface.  Supervision    Functional Mobility Min A,w/walker   Couple steps from bed to chair   Shuffling feet  Min A pivot bed to chair using w/w for support.  Limited tolerance.   Supervision  with good tolerance    Balance Sitting:     Static:  Independent    Dynamic:SBA   Standing: Mod/Chanel  Stand balance during AdL min A    Independent/supervision    Activity Tolerance SOB with activity   On room air   Sats >90% Limited tolerance for standing during ADL.    Good  with ADL activity    Visual/  Perceptual Glasses: none by bedside           UE ROM/strength  R shoulder approx 90 degrees -   Other wise UEs WFLs  Pt reports he is going to get ultrasound of UE later today.  Tolerate UE therapeutic activity/exercises to increase strength/endurance for ADL/xfer activity     Comments:  Pt laying in the bed.  Agreeable to therapy.  Nursing present and completed bladder scan prior to pt getting OOB.  Catheter was kinked at the top and pt able to void a lot through catheter once tube straightened.  Pt required increased assist to get OOB and to a chair this AM.  He remained seated in the chair.      Education/treatment:  ADL retraining with facilitation of movement to increase self care skills. Therapeutic activity to address balance and endurance for ADL and transfers.  Pt education of walker safety, transfer safety.       Pt has made  progress towards set goals.       Time In: 800  Time Out: 830     Min Units   Therapeutic Ex 08475     Therapeutic Activities 12021 15 1   ADL/Self Care 67585 15 1   Orthotic Management 15650     Neuro Re-Ed 83237     Non-Billable Time     TOTAL TIMED TREATMENT 30 2         Connie NOBLES/L [id number]

## 2024-11-04 NOTE — PROGRESS NOTES
[facility]  Progress Note    Chief complaint :  Chief Complaint   Patient presents with    Gait Problem     Left knee replacement on tuesday; having trouble getting around at home; denies any injuries; wants to have rehab placement        Subjective:    No overnight problems. Patient describes feeling unchanged. Patient denies chest pain, nausea, vomiting, bloody stools, fever, chills, changes in urination, changes in BM. Patient is tolerating diet.    Patient was seen this morning awake in bed.  Patient was breathing mildly heavy however he states this is his baseline and normal for him.  He does feel like his cough is at his chronic baseline as well.  He has not needed oxygen and says he does not feel very short of breath unless he is coughing very heavily.  He is eager to go to rehab, I let him know that antibiotics have not stopped and he said good urine output for still monitoring his renal function as well as breathing from his COPD.  Even though patient vitally stable off oxygen he appears very edematous and does appear to have some increased work of breathing, will stop IV fluids this morning and get stat chest x-ray as there is concern for volume overload and pulmonary edema possibly    Past medical, surgical, family and social history were reviewed, non-contributory, and unchanged unless otherwise stated.    Review of Systems   Constitutional:  Negative for chills and fever.   Respiratory:  Positive for cough (chronic) and wheezing. Negative for chest tightness and shortness of breath.    Cardiovascular:  Positive for leg swelling. Negative for chest pain and palpitations.   Gastrointestinal:  Negative for abdominal pain, blood in stool, constipation, diarrhea, nausea and vomiting.   Genitourinary:  Negative for decreased urine volume, difficulty urinating, dysuria and hematuria.   Musculoskeletal:  Positive for gait problem.   Neurological:  Negative for seizures and  Range    POC Glucose 212 (H) 74 - 99 mg/dL   POCT Glucose    Collection Time: 11/04/24  9:30 PM   Result Value Ref Range    POC Glucose 171 (H) 74 - 99 mg/dL   POCT Glucose    Collection Time: 11/05/24  5:42 AM   Result Value Ref Range    POC Glucose 154 (H) 74 - 99 mg/dL   CBC with Auto Differential    Collection Time: 11/05/24  6:10 AM   Result Value Ref Range    WBC 11.4 4.5 - 11.5 k/uL    RBC 2.93 (L) 3.80 - 5.80 m/uL    Hemoglobin 9.4 (L) 12.5 - 16.5 g/dL    Hematocrit 28.7 (L) 37.0 - 54.0 %    MCV 98.0 80.0 - 99.9 fL    MCH 32.1 26.0 - 35.0 pg    MCHC 32.8 32.0 - 34.5 g/dL    RDW 13.0 11.5 - 15.0 %    Platelets 247 130 - 450 k/uL    MPV 10.4 7.0 - 12.0 fL    Neutrophils % 69 43.0 - 80.0 %    Lymphocytes % 13 (L) 20.0 - 42.0 %    Monocytes % 10 2.0 - 12.0 %    Eosinophils % 5 0 - 6 %    Basophils % 0 0.0 - 2.0 %    Immature Granulocytes % 3 0.0 - 5.0 %    Neutrophils Absolute 7.82 (H) 1.80 - 7.30 k/uL    Lymphocytes Absolute 1.52 1.50 - 4.00 k/uL    Monocytes Absolute 1.13 (H) 0.10 - 0.95 k/uL    Eosinophils Absolute 0.55 (H) 0.05 - 0.50 k/uL    Basophils Absolute 0.03 0.00 - 0.20 k/uL    Immature Granulocytes Absolute 0.37 0.00 - 0.58 k/uL   Comprehensive Metabolic Panel w/ Reflex to MG    Collection Time: 11/05/24  6:10 AM   Result Value Ref Range    Sodium 132 132 - 146 mmol/L    Potassium 4.2 3.5 - 5.0 mmol/L    Chloride 103 98 - 107 mmol/L    CO2 19 (L) 22 - 29 mmol/L    Anion Gap 10 7 - 16 mmol/L    Glucose 135 (H) 74 - 99 mg/dL    BUN 37 (H) 6 - 23 mg/dL    Creatinine 1.8 (H) 0.70 - 1.20 mg/dL    Est, Glom Filt Rate 39 (L) >60 mL/min/1.73m2    Calcium 8.2 (L) 8.6 - 10.2 mg/dL    Total Protein 5.6 (L) 6.4 - 8.3 g/dL    Albumin 2.6 (L) 3.5 - 5.2 g/dL    Total Bilirubin 0.5 0.0 - 1.2 mg/dL    Alkaline Phosphatase 88 40 - 129 U/L    ALT 65 (H) 0 - 40 U/L    AST 87 (H) 0 - 39 U/L   Phosphorus    Collection Time: 11/05/24  6:10 AM   Result Value Ref Range    Phosphorus 2.6 2.5 - 4.5 mg/dL   Magnesium

## 2024-11-04 NOTE — PROGRESS NOTES
°C)  Constitutional: The patient is awake, alert, and oriented.  Sitting up in bed.  Skin: Warm and dry. No rashes were noted.   HEENT: Round and reactive pupils.  Moist mucous membranes.  No ulcerations or thrush.  Neck: Supple to movements.   Chest: No use of accessory muscles to breathe. Symmetrical expansion.  No wheezing, crackles or rhonchi.  Cardiovascular: S1 and S2 are rhythmic and regular. No murmurs appreciated.   Abdomen: Positive bowel sounds to auscultation. Benign to palpation. No masses felt.  Extremities: left knee aquacell dressing intact - no drainage noted. Knee is warm but there is no erythema noted  Lines: Peripheral.  Yen catheter     Laboratory and Tests:  Lab Results   Component Value Date    .0 (H) 11/01/2024     Lab Results   Component Value Date    SEDRATE 53 (H) 11/01/2024       Radiology:    Microbiology  Blood cultures 11/1: negative so far  MRSA NS: negative  RVP: negative  Urine culture:cancelled? UA clean      Assessment:  Fever, etiology unclear  Doubt surgical site infection or septic joint left knee   Left knee osteoarthritis, s/p left TKA on 10/29  Leukocytosis     Plan:  Continue cefepime at 2g q12h and Vancomycin for now   Labs and cultures reviewed  KUB pending     WALI Cook - CNP  11:18 AM  11/4/2024    Patient seen and examined. I had a face to face encounter with the patient. Agree with exam.  Assessment and plan as outlined above and directed by me. Addition and corrections were done as deemed appropriate. My exam and plan include: The patient is not known to the ID service.  He underwent a left knee arthroplasty on 10/29/2024.  Admitted to the hospital because he could not take care of himself.  He had a low-grade fever on presentation.  He has been seen by orthopedic surgery.  They are not concerned for an infection of the knee.  All cultures were negative.  Antibiotics can be discontinued altogether.  We will follow with you.    Nir Godinez,  MD  11/4/2024  2:18 PM

## 2024-11-04 NOTE — CARE COORDINATION
Updated plan of care. ID following, continue iv anbiotics. KUB pending. [place] to initiate pr-cert once therapies done. Will follow-jasono

## 2024-11-04 NOTE — PATIENT CARE CONFERENCE
P Quality Flow/Interdisciplinary Rounds Progress Note        Quality Flow Rounds held on November 4, 2024    Disciplines Attending:  Bedside Nurse, , , and Nursing Unit Leadership    Mason Gudino was admitted on 10/31/2024  4:14 PM    Anticipated Discharge Date:       Disposition:    Jace Score:  Jace Scale Score: 20    Readmission Risk              Risk of Unplanned Readmission:  20           Discussed patient goal for the day, patient clinical progression, and barriers to discharge.  The following Goal(s) of the Day/Commitment(s) have been identified:   Discharge planning.      Mae Garcia RN  November 4, 2024

## 2024-11-04 NOTE — PROGRESS NOTES
Pharmacy Consultation Note  (Antibiotic Dosing and Monitoring)        Note vancomycin discontinued. Clinical pharmacy will sign-off. Please re-consult if we can be of further assistance.    Thanks for this consult,  Ave Cuello RPH, PharmD, BCPS  11/4/2024  3:02 PM

## 2024-11-04 NOTE — PROGRESS NOTES
Date: 11/3/2024    Time: 10:47 PM    Patient Placed On BIPAP/CPAP/ Non-Invasive Ventilation?  Yes    If no must comment.  Facial area red/color change? No           If YES are Blister/Lesion present?No   If yes must notify nursing staff  BIPAP/CPAP skin barrier?  Yes    Skin barrier type:mepilexlite       Comments:        Belkis Roth RCP

## 2024-11-04 NOTE — PROGRESS NOTES
At this time patient requires Yen in place for strict I's and O's as well as urinary retention, patient had a positive postvoid residual with external catheter in place and was retaining urine which may have been contributing to his acute kidney injury.  We will try a voiding trial prior to discharge, likely tomorrow.  At this time patient has good urine output with Yen in place.

## 2024-11-04 NOTE — PROGRESS NOTES
Daily Treat       Evaluating PT:  Ben Rivas PT     Room #:  0732/0732-A  Diagnosis:  Hx LT TKA 10/29/24, Diff walking   Pertinent PMHx/PSHx:  See PMH  Procedure/Surgery:  LT TKA 10/29/24  home and back to hospital   Precautions:  Falls, WBAT  Equipment Needs:  WC for indep mobility and WW with assistance x 2     SUBJECTIVE:     Pt lives with Dtr in a 1 story home with 1 mini stairs and 0 rail to enter.    Pt ambulated with SC prior to LT TKA PTA.  Reports fall history prior to LT TKA using SC.     OBJECTIVE:    Initial Evaluation  Date: 11/2/24 Treatment  11/4/24 Short Term/ Long Term   Goals   Was pt agreeable to Eval/treatment? Yes Yes      Does pt have pain? LT knee LT knee      Bed Mobility   NA-in chair on arrival.  Aids report Mod/Max x 2  NT Mod assistance or <   Transfers Sit to stand toilet: Max x 2  Stand to sit: Min/Mod  Stand pivot: Sit to stand: Mod A  Stand to sit: Min A Mod x 2   Ambulation   10 feet with WW Min/close CGA x 2 AAT, moderate/high fall risks 120 feet with WW Min/CGA  25-40 feet with WW Min x 2    Stair negotiation: ascended and descended  NA  NT Rehab to address   ROM LT knee -10*-90*   LT Knee   MMT Limited efforts/unreliable, 3/5 via AROM   BLE's 1 gr   AM-PAC 6 Clicks 13/24 13/24      Pt is alert, following instruction, states feels weak  Balance: fair minus/poor dynamic with WW    Pt performed therapeutic exercise of the following: seated B ankle pumps, L LE quad sets x 20; pillow case slides x 40 AROM    Patient education/treatment  Pt was educated on therapeutic exercise for circulation/ROM/strengthening, UE usage transfer safety, gait mechanics for posture/staying within WW base of support    Patient response to education:   Pt verbalized understanding Pt demonstrated skill Pt requires further education in this area   yes With prompt transfers yes     ASSESSMENT:   Comments: Pt found in a bedside chair, exercise performed. Gait slow, inconsistent and laboring. Pt unsteady,

## 2024-11-05 ENCOUNTER — APPOINTMENT (OUTPATIENT)
Dept: GENERAL RADIOLOGY | Age: 79
End: 2024-11-05
Payer: MEDICARE

## 2024-11-05 LAB
25(OH)D3 SERPL-MCNC: 11.4 NG/ML (ref 30–100)
ALBUMIN SERPL-MCNC: 2.6 G/DL (ref 3.5–5.2)
ALP SERPL-CCNC: 88 U/L (ref 40–129)
ALT SERPL-CCNC: 65 U/L (ref 0–40)
ANION GAP SERPL CALCULATED.3IONS-SCNC: 10 MMOL/L (ref 7–16)
AST SERPL-CCNC: 87 U/L (ref 0–39)
BASOPHILS # BLD: 0.03 K/UL (ref 0–0.2)
BASOPHILS NFR BLD: 0 % (ref 0–2)
BILIRUB SERPL-MCNC: 0.5 MG/DL (ref 0–1.2)
BUN SERPL-MCNC: 37 MG/DL (ref 6–23)
CALCIUM SERPL-MCNC: 8.2 MG/DL (ref 8.6–10.2)
CHLORIDE SERPL-SCNC: 103 MMOL/L (ref 98–107)
CO2 SERPL-SCNC: 19 MMOL/L (ref 22–29)
CREAT SERPL-MCNC: 1.8 MG/DL (ref 0.7–1.2)
EKG ATRIAL RATE: 65 BPM
EKG P AXIS: 67 DEGREES
EKG P-R INTERVAL: 198 MS
EKG Q-T INTERVAL: 446 MS
EKG QRS DURATION: 128 MS
EKG QTC CALCULATION (BAZETT): 463 MS
EKG R AXIS: -24 DEGREES
EKG T AXIS: 33 DEGREES
EKG VENTRICULAR RATE: 65 BPM
EOSINOPHIL # BLD: 0.55 K/UL (ref 0.05–0.5)
EOSINOPHILS RELATIVE PERCENT: 5 % (ref 0–6)
ERYTHROCYTE [DISTWIDTH] IN BLOOD BY AUTOMATED COUNT: 13 % (ref 11.5–15)
FERRITIN SERPL-MCNC: 1146 NG/ML
FOLATE SERPL-MCNC: 3.3 NG/ML (ref 4.8–24.2)
GFR, ESTIMATED: 39 ML/MIN/1.73M2
GLUCOSE BLD-MCNC: 154 MG/DL (ref 74–99)
GLUCOSE BLD-MCNC: 188 MG/DL (ref 74–99)
GLUCOSE BLD-MCNC: 282 MG/DL (ref 74–99)
GLUCOSE BLD-MCNC: 287 MG/DL (ref 74–99)
GLUCOSE SERPL-MCNC: 135 MG/DL (ref 74–99)
HCT VFR BLD AUTO: 28.7 % (ref 37–54)
HGB BLD-MCNC: 9.4 G/DL (ref 12.5–16.5)
IMM GRANULOCYTES # BLD AUTO: 0.37 K/UL (ref 0–0.58)
IMM GRANULOCYTES NFR BLD: 3 % (ref 0–5)
IRON SATN MFR SERPL: 20 % (ref 20–55)
IRON SERPL-MCNC: 27 UG/DL (ref 59–158)
LYMPHOCYTES NFR BLD: 1.52 K/UL (ref 1.5–4)
LYMPHOCYTES RELATIVE PERCENT: 13 % (ref 20–42)
MAGNESIUM SERPL-MCNC: 2.3 MG/DL (ref 1.6–2.6)
MCH RBC QN AUTO: 32.1 PG (ref 26–35)
MCHC RBC AUTO-ENTMCNC: 32.8 G/DL (ref 32–34.5)
MCV RBC AUTO: 98 FL (ref 80–99.9)
MONOCYTES NFR BLD: 1.13 K/UL (ref 0.1–0.95)
MONOCYTES NFR BLD: 10 % (ref 2–12)
NEUTROPHILS NFR BLD: 69 % (ref 43–80)
NEUTS SEG NFR BLD: 7.82 K/UL (ref 1.8–7.3)
PHOSPHATE SERPL-MCNC: 2.6 MG/DL (ref 2.5–4.5)
PLATELET # BLD AUTO: 247 K/UL (ref 130–450)
PMV BLD AUTO: 10.4 FL (ref 7–12)
POTASSIUM SERPL-SCNC: 4.2 MMOL/L (ref 3.5–5)
PROT SERPL-MCNC: 5.6 G/DL (ref 6.4–8.3)
PTH-INTACT SERPL-MCNC: 58.1 PG/ML (ref 15–65)
RBC # BLD AUTO: 2.93 M/UL (ref 3.8–5.8)
SODIUM SERPL-SCNC: 132 MMOL/L (ref 132–146)
TIBC SERPL-MCNC: 133 UG/DL (ref 250–450)
TROPONIN I SERPL HS-MCNC: 13 NG/L (ref 0–11)
TROPONIN I SERPL HS-MCNC: 13 NG/L (ref 0–11)
VIT B12 SERPL-MCNC: 746 PG/ML (ref 211–946)
WBC OTHER # BLD: 11.4 K/UL (ref 4.5–11.5)

## 2024-11-05 PROCEDURE — 85025 COMPLETE CBC W/AUTO DIFF WBC: CPT

## 2024-11-05 PROCEDURE — 6360000002 HC RX W HCPCS

## 2024-11-05 PROCEDURE — 1200000000 HC SEMI PRIVATE

## 2024-11-05 PROCEDURE — 71045 X-RAY EXAM CHEST 1 VIEW: CPT

## 2024-11-05 PROCEDURE — 97530 THERAPEUTIC ACTIVITIES: CPT

## 2024-11-05 PROCEDURE — 6370000000 HC RX 637 (ALT 250 FOR IP)

## 2024-11-05 PROCEDURE — 93010 ELECTROCARDIOGRAM REPORT: CPT | Performed by: INTERNAL MEDICINE

## 2024-11-05 PROCEDURE — 97110 THERAPEUTIC EXERCISES: CPT

## 2024-11-05 PROCEDURE — 80053 COMPREHEN METABOLIC PANEL: CPT

## 2024-11-05 PROCEDURE — 83550 IRON BINDING TEST: CPT

## 2024-11-05 PROCEDURE — 83735 ASSAY OF MAGNESIUM: CPT

## 2024-11-05 PROCEDURE — 82728 ASSAY OF FERRITIN: CPT

## 2024-11-05 PROCEDURE — 84100 ASSAY OF PHOSPHORUS: CPT

## 2024-11-05 PROCEDURE — 36415 COLL VENOUS BLD VENIPUNCTURE: CPT

## 2024-11-05 PROCEDURE — 84484 ASSAY OF TROPONIN QUANT: CPT

## 2024-11-05 PROCEDURE — 82607 VITAMIN B-12: CPT

## 2024-11-05 PROCEDURE — 83540 ASSAY OF IRON: CPT

## 2024-11-05 PROCEDURE — 6370000000 HC RX 637 (ALT 250 FOR IP): Performed by: INTERNAL MEDICINE

## 2024-11-05 PROCEDURE — 82962 GLUCOSE BLOOD TEST: CPT

## 2024-11-05 PROCEDURE — 82306 VITAMIN D 25 HYDROXY: CPT

## 2024-11-05 PROCEDURE — 82746 ASSAY OF FOLIC ACID SERUM: CPT

## 2024-11-05 PROCEDURE — 99232 SBSQ HOSP IP/OBS MODERATE 35: CPT | Performed by: FAMILY MEDICINE

## 2024-11-05 PROCEDURE — 93005 ELECTROCARDIOGRAM TRACING: CPT

## 2024-11-05 PROCEDURE — 84155 ASSAY OF PROTEIN SERUM: CPT

## 2024-11-05 PROCEDURE — 2580000003 HC RX 258

## 2024-11-05 PROCEDURE — 83970 ASSAY OF PARATHORMONE: CPT

## 2024-11-05 PROCEDURE — 84165 PROTEIN E-PHORESIS SERUM: CPT

## 2024-11-05 PROCEDURE — 94660 CPAP INITIATION&MGMT: CPT

## 2024-11-05 PROCEDURE — 94640 AIRWAY INHALATION TREATMENT: CPT

## 2024-11-05 RX ORDER — IPRATROPIUM BROMIDE AND ALBUTEROL SULFATE 2.5; .5 MG/3ML; MG/3ML
1 SOLUTION RESPIRATORY (INHALATION)
Status: DISCONTINUED | OUTPATIENT
Start: 2024-11-05 | End: 2024-11-07 | Stop reason: HOSPADM

## 2024-11-05 RX ORDER — FUROSEMIDE 40 MG/1
40 TABLET ORAL DAILY
Status: DISCONTINUED | OUTPATIENT
Start: 2024-11-06 | End: 2024-11-07 | Stop reason: HOSPADM

## 2024-11-05 RX ORDER — BLOOD-GLUCOSE METER
KIT MISCELLANEOUS
Qty: 100 STRIP | Refills: 3 | OUTPATIENT
Start: 2024-11-05

## 2024-11-05 RX ORDER — PREDNISONE 20 MG/1
40 TABLET ORAL DAILY
Status: DISCONTINUED | OUTPATIENT
Start: 2024-11-05 | End: 2024-11-07 | Stop reason: HOSPADM

## 2024-11-05 RX ORDER — FOLIC ACID 1 MG/1
1 TABLET ORAL DAILY
Status: DISCONTINUED | OUTPATIENT
Start: 2024-11-05 | End: 2024-11-07 | Stop reason: HOSPADM

## 2024-11-05 RX ORDER — FUROSEMIDE 10 MG/ML
20 INJECTION INTRAMUSCULAR; INTRAVENOUS ONCE
Status: COMPLETED | OUTPATIENT
Start: 2024-11-05 | End: 2024-11-05

## 2024-11-05 RX ORDER — ERGOCALCIFEROL 1.25 MG/1
50000 CAPSULE, LIQUID FILLED ORAL WEEKLY
Status: DISCONTINUED | OUTPATIENT
Start: 2024-11-05 | End: 2024-11-07 | Stop reason: HOSPADM

## 2024-11-05 RX ORDER — TAMSULOSIN HYDROCHLORIDE 0.4 MG/1
0.4 CAPSULE ORAL DAILY
Qty: 90 CAPSULE | Refills: 3 | OUTPATIENT
Start: 2024-11-05

## 2024-11-05 RX ADMIN — FUROSEMIDE 20 MG: 10 INJECTION, SOLUTION INTRAMUSCULAR; INTRAVENOUS at 09:38

## 2024-11-05 RX ADMIN — ERGOCALCIFEROL 50000 UNITS: 1.25 CAPSULE ORAL at 20:39

## 2024-11-05 RX ADMIN — INSULIN LISPRO 4 UNITS: 100 INJECTION, SOLUTION INTRAVENOUS; SUBCUTANEOUS at 20:40

## 2024-11-05 RX ADMIN — BUDESONIDE 500 MCG: 0.5 SUSPENSION RESPIRATORY (INHALATION) at 20:07

## 2024-11-05 RX ADMIN — INSULIN LISPRO 2 UNITS: 100 INJECTION, SOLUTION INTRAVENOUS; SUBCUTANEOUS at 11:41

## 2024-11-05 RX ADMIN — INSULIN LISPRO 4 UNITS: 100 INJECTION, SOLUTION INTRAVENOUS; SUBCUTANEOUS at 17:06

## 2024-11-05 RX ADMIN — FOLIC ACID 1 MG: 1 TABLET ORAL at 20:39

## 2024-11-05 RX ADMIN — ATORVASTATIN CALCIUM 40 MG: 40 TABLET, FILM COATED ORAL at 09:37

## 2024-11-05 RX ADMIN — APIXABAN 2.5 MG: 2.5 TABLET, FILM COATED ORAL at 09:37

## 2024-11-05 RX ADMIN — ARFORMOTEROL TARTRATE 15 MCG: 15 SOLUTION RESPIRATORY (INHALATION) at 20:07

## 2024-11-05 RX ADMIN — PETROLATUM: 420 OINTMENT TOPICAL at 09:46

## 2024-11-05 RX ADMIN — BUDESONIDE 500 MCG: 0.5 SUSPENSION RESPIRATORY (INHALATION) at 07:03

## 2024-11-05 RX ADMIN — PREDNISONE 40 MG: 20 TABLET ORAL at 09:37

## 2024-11-05 RX ADMIN — IPRATROPIUM BROMIDE AND ALBUTEROL SULFATE 1 DOSE: 2.5; .5 SOLUTION RESPIRATORY (INHALATION) at 20:07

## 2024-11-05 RX ADMIN — IPRATROPIUM BROMIDE AND ALBUTEROL SULFATE 1 DOSE: 2.5; .5 SOLUTION RESPIRATORY (INHALATION) at 11:50

## 2024-11-05 RX ADMIN — IPRATROPIUM BROMIDE AND ALBUTEROL SULFATE 1 DOSE: 2.5; .5 SOLUTION RESPIRATORY (INHALATION) at 16:40

## 2024-11-05 RX ADMIN — INSULIN GLARGINE 10 UNITS: 100 INJECTION, SOLUTION SUBCUTANEOUS at 09:54

## 2024-11-05 RX ADMIN — DONEPEZIL HYDROCHLORIDE 10 MG: 10 TABLET, FILM COATED ORAL at 20:35

## 2024-11-05 RX ADMIN — IPRATROPIUM BROMIDE 0.5 MG: 0.5 SOLUTION RESPIRATORY (INHALATION) at 07:03

## 2024-11-05 RX ADMIN — SODIUM CHLORIDE, PRESERVATIVE FREE 10 ML: 5 INJECTION INTRAVENOUS at 20:43

## 2024-11-05 RX ADMIN — TAMSULOSIN HYDROCHLORIDE 0.4 MG: 0.4 CAPSULE ORAL at 09:37

## 2024-11-05 RX ADMIN — AMIODARONE HYDROCHLORIDE 200 MG: 200 TABLET ORAL at 09:37

## 2024-11-05 RX ADMIN — PETROLATUM: 420 OINTMENT TOPICAL at 20:37

## 2024-11-05 RX ADMIN — ARFORMOTEROL TARTRATE 15 MCG: 15 SOLUTION RESPIRATORY (INHALATION) at 07:03

## 2024-11-05 RX ADMIN — SODIUM CHLORIDE, PRESERVATIVE FREE 10 ML: 5 INJECTION INTRAVENOUS at 09:39

## 2024-11-05 RX ADMIN — APIXABAN 2.5 MG: 2.5 TABLET, FILM COATED ORAL at 20:35

## 2024-11-05 RX ADMIN — METOPROLOL SUCCINATE 50 MG: 50 TABLET, EXTENDED RELEASE ORAL at 09:37

## 2024-11-05 ASSESSMENT — ENCOUNTER SYMPTOMS
ABDOMINAL PAIN: 0
COUGH: 1
WHEEZING: 1
SHORTNESS OF BREATH: 0
CONSTIPATION: 0
VOMITING: 0
NAUSEA: 0
BLOOD IN STOOL: 0
CHEST TIGHTNESS: 0
DIARRHEA: 0

## 2024-11-05 NOTE — PROGRESS NOTES
Physical Therapy  Facility/Department: [address] [facility]  Daily Treatment Note  NAME: Mason Gudino  : 1945  MRN: [id number]    Date of Service: 2024      Evaluating PT:  Ben Rivas PT     Room #:  0732/0732-A  Diagnosis:  Hx LT TKA 10/29/24, Diff walking   Pertinent PMHx/PSHx:  See PMH  Procedure/Surgery:  LT TKA 10/29/24  home and back to hospital   Precautions:  Falls, WBAT  Equipment Needs:  WC for indep mobility and WW with assistance x 2     SUBJECTIVE:     Pt lives with Dtr in a 1 story home with 1 mini stairs and 0 rail to enter.    Pt ambulated with SC prior to LT TKA PTA.  Reports fall history prior to LT TKA using SC.     OBJECTIVE:    Initial Evaluation  Date: 24 Treatment  24 Short Term/ Long Term   Goals   Was pt agreeable to Eval/treatment? Yes Yes      Does pt have pain? LT knee LT knee      Bed Mobility   NA-in chair on arrival.  Aids report Mod/Max x 2  Supine to sit : min/mod assist with HOB elevated  Mod assistance or <   Transfers Sit to stand toilet: Max x 2  Stand to sit: Min/Mod  Stand pivot: Sit to stand: Min A from slightly elevated bed   Stand to sit: Min A Mod x 2   Ambulation   10 feet with WW Min/close CGA x 2 AAT, moderate/high fall risks 120 feet with WW CGA  25-40 feet with WW Min x 2    Stair negotiation: ascended and descended  NA  NT Rehab to address   ROM LT knee -10*-90*   LT Knee   MMT Limited efforts/unreliable, 3/ via AROM   BLE's 1 gr   AM-PAC 6 Clicks 13/24  15/24        Pt is alert, following instruction  Balance: CGA with gait using ww. Fall risk due to weakness and decreased activity tolerance      Pt performed therapeutic exercise of the following: supine  B ankle pumps, L LE quad sets , seated LAQS with cues needed for proper technique      Patient education/treatment  Pt was educated on safety with mobility , proper breathing technique      Patient response to education:   Pt verbalized understanding Pt demonstrated skill Pt  requires further education in this area   yes With cues  yes      ASSESSMENT:   Comments: Pt in bed upon arrival . His son was present . Mobility as above. Audible wheezing noted with minimal mobility. SpO2 94% and HR 77. Gait jean slow. Pt required multiple standing rest breaks. Instructed in PLB ( needs reinforced)/ SpO2 >93% throughout session      Pt was left in a bedside chair with  call light in reach     Time in 1110   Time out 1135   Total Treatment Time 25 minutes   CPT codes:      Therapeutic activities 73574 17 minutes   Therapeutic exercises 21892 8 minutes        Continue with physical therapy current plan of care.    Yady Wood   PT [id number]

## 2024-11-05 NOTE — CARE COORDINATION
Updated plan of care. Pending auth to [facility]. ID cancelled antibiotics. Cultures negative. Await auth-[email or web address]

## 2024-11-05 NOTE — PLAN OF CARE
Problem: Skin/Tissue Integrity  Goal: Absence of new skin breakdown  Description: 1.  Monitor for areas of redness and/or skin breakdown  2.  Assess vascular access sites hourly  3.  Every 4-6 hours minimum:  Change oxygen saturation probe site  4.  Every 4-6 hours:  If on nasal continuous positive airway pressure, respiratory therapy assess nares and determine need for appliance change or resting period.  11/5/2024 1057 by Betsy Boudreaux RN  Outcome: Progressing     Problem: ABCDS Injury Assessment  Goal: Absence of physical injury  11/5/2024 1057 by Betsy Boudreaux RN  Outcome: Progressing     Problem: Discharge Planning  Goal: Discharge to home or other facility with appropriate resources  11/5/2024 1057 by Betsy Boudreaux RN  Outcome: Progressing  Flowsheets (Taken 11/4/2024 2138 by Elizabeth Lindsey, RN)  Discharge to home or other facility with appropriate resources: Refer to discharge planning if patient needs post-hospital services based on physician order or complex needs related to functional status, cognitive ability or social support system     Problem: Chronic Conditions and Co-morbidities  Goal: Patient's chronic conditions and co-morbidity symptoms are monitored and maintained or improved  11/5/2024 1057 by Betsy Boudreaux RN  Outcome: Progressing     Problem: Discharge Planning  Goal: Discharge to home or other facility with appropriate resources  11/5/2024 1057 by Betsy Boudreaux RN  Outcome: Progressing  Flowsheets (Taken 11/4/2024 2138 by Elizabeth Lindsey, RN)  Discharge to home or other facility with appropriate resources: Refer to discharge planning if patient needs post-hospital services based on physician order or complex needs related to functional status, cognitive ability or social support system     Problem: Safety - Adult  Goal: Free from fall injury  11/5/2024 1057 by Betsy Boudreaux, RN  Outcome: Progressing

## 2024-11-05 NOTE — PATIENT CARE CONFERENCE
P Quality Flow/Interdisciplinary Rounds Progress Note        Quality Flow Rounds held on November 5, 2024    Disciplines Attending:  Bedside Nurse, , , and Nursing Unit Leadership    Mason Gudino was admitted on 10/31/2024  4:14 PM    Anticipated Discharge Date:       Disposition:    Jace Score:  Jace Scale Score: 16    Readmission Risk              Risk of Unplanned Readmission:  23           Discussed patient goal for the day, patient clinical progression, and barriers to discharge.  The following Goal(s) of the Day/Commitment(s) have been identified:   Discharge planning.      Mae Garcia RN  November 5, 2024

## 2024-11-05 NOTE — PROGRESS NOTES
Spiritual Health History and Assessment/Progress Note  [facility]    Follow-up,  ,  ,      Name: Mason Gudino MRN: [id number]    Age: 79 y.o.     Sex: male   Language: English   Zoroastrian: Catholic   Inability to ambulate due to left knee     Date: 11/4/2024                           Spiritual Assessment began in [facility]        Referral/Consult From: Rounding   Encounter Overview/Reason: Follow-up  Service Provided For: Patient, Family    Nataly, Belief, Meaning:   Patient is connected with a nataly tradition or spiritual practice  Family/Friends Other: unable to assess      Importance and Influence:  Patient has no beliefs influential to healthcare decision-making identified during this visit  Family/Friends have no beliefs influential to healthcare decision-making identified during this visit    Community:  Patient feels well-supported. Support system includes: Children  Family/Friends feel well-supported. Support system includes: Extended family    Assessment and Plan of Care:     Patient Interventions include: Facilitated expression of thoughts and feelings  Family/Friends Interventions include: Facilitated expression of thoughts and feelings    Patient Plan of Care: Spiritual Care available upon further referral  Family/Friends Plan of Care: Spiritual Care available upon further referral    Electronically signed by Chaplain Roro on 11/4/2024 at 7:13 PM

## 2024-11-05 NOTE — ACP (ADVANCE CARE PLANNING)
Advance Care Planning   Healthcare Decision Maker:    Primary Decision Maker: Mason Gudino - Child - [phone]    Secondary Decision Maker: Gianna Villalobos - Child - [phone]    Click here to complete Healthcare Decision Makers including selection of the Healthcare Decision Maker Relationship (ie \"Primary\").

## 2024-11-05 NOTE — PROGRESS NOTES
[facility]  JOHNATHAN  Progress Note    SUBJECTIVE:  Chief Complaint   Patient presents with    Gait Problem     Left knee replacement on tuesday; having trouble getting around at home; denies any injuries; wants to have rehab placement      Patient is lying flat in bed  He has remained afebrile  He is on room air   Says he is doing good   Feels \"swollen all over\" and says his legs feel like \"tree stumps\"      Review of systems:  As stated above in the chief complaint, otherwise negative.    Medications:  Scheduled Meds:   ipratropium 0.5 mg-albuterol 2.5 mg  1 Dose Inhalation Q4H WA RT    predniSONE  40 mg Oral Daily    [START ON 2024] furosemide  40 mg Oral Daily    budesonide  0.5 mg Nebulization BID RT    insulin glargine  10 Units SubCUTAneous Daily    insulin lispro  0-8 Units SubCUTAneous 4x Daily AC & HS    white petrolatum   Topical BID    amiodarone  200 mg Oral QAM    apixaban  2.5 mg Oral BID    atorvastatin  40 mg Oral Daily    donepezil  10 mg Oral Nightly    metoprolol succinate  50 mg Oral QAM    tamsulosin  0.4 mg Oral QAM    sodium chloride flush  5-40 mL IntraVENous 2 times per day    arformoterol tartrate  15 mcg Nebulization BID RT     Continuous Infusions:   [Held by provider] sodium chloride Stopped (24 0745)    sodium chloride      dextrose       PRN Meds:sennosides-docusate sodium, polyethylene glycol, sodium chloride flush, sodium chloride, ondansetron **OR** ondansetron, acetaminophen **OR** acetaminophen, oxyCODONE-acetaminophen, glucose, dextrose bolus **OR** dextrose bolus, glucagon (rDNA), dextrose    OBJECTIVE:  BP (!) 141/58   Pulse 67   Temp 98.7 °F (37.1 °C) (Oral)   Resp (!) 32   Ht 1.803 m (5' 11\")   Wt 116.1 kg (256 lb)   SpO2 91%   BMI 35.70 kg/m²   Temp  Av.6 °F (37 °C)  Min: 98 °F (36.7 °C)  Max: 98.8 °F (37.1 °C)  Constitutional: The patient is awake, alert, and oriented.  Lying flat in bed. Visitor at bedside  Skin:

## 2024-11-05 NOTE — PLAN OF CARE
Problem: ABCDS Injury Assessment  Goal: Absence of physical injury  11/4/2024 2110 by Elizabeth Lindsey, RN  Outcome: Progressing  11/4/2024 1108 by Betsy Boudreaux RN  Outcome: Progressing     Problem: Skin/Tissue Integrity  Goal: Absence of new skin breakdown  Description: 1.  Monitor for areas of redness and/or skin breakdown  2.  Assess vascular access sites hourly  3.  Every 4-6 hours minimum:  Change oxygen saturation probe site  4.  Every 4-6 hours:  If on nasal continuous positive airway pressure, respiratory therapy assess nares and determine need for appliance change or resting period.  11/4/2024 2110 by Elizabeth Lindsey, RN  Outcome: Progressing  11/4/2024 1108 by Betsy Boudreaux RN  Outcome: Progressing     Problem: Discharge Planning  Goal: Discharge to home or other facility with appropriate resources  11/4/2024 2110 by Elizabeth Linsdey, RN  Outcome: Progressing  11/4/2024 1108 by Betsy Boudreaux RN  Outcome: Progressing     Problem: Chronic Conditions and Co-morbidities  Goal: Patient's chronic conditions and co-morbidity symptoms are monitored and maintained or improved  11/4/2024 2110 by Elizabeth Lindsey, RN  Outcome: Progressing  11/4/2024 1108 by Betsy Boudreaux RN  Outcome: Progressing     Problem: Safety - Adult  Goal: Free from fall injury  11/4/2024 2110 by Elizabeth Lindsey, RN  Outcome: Progressing  11/4/2024 1108 by Betsy Boudreaux RN  Outcome: Progressing

## 2024-11-05 NOTE — PROGRESS NOTES
cardiopulmonary process.  Recommend   follow-up evaluation         Vascular duplex upper extremity venous left   Final Result   1. No evidence of DVT.   2. Superficial thrombus in the left cephalic vein in the proximal forearm.         XR ABDOMEN (KUB) (SINGLE AP VIEW)   Final Result   Nonobstructive bowel gas pattern.         XR CHEST PORTABLE   Final Result   No acute process.  Specifically, no definitive evidence for pneumonia.         CTA PULMONARY W CONTRAST   Final Result   1. No acute pulmonary artery embolism.   2. Status post aortic valve repair without complication.   3. Hiatal hernia with heterogeneous contents throughout the esophagus   extending to the thoracic inlet level. This may relate to ingested contents.   Correlate clinical evidence of gastroesophageal reflux.         XR CHEST PORTABLE   Final Result   No acute process.         XR KNEE LEFT (3 VIEWS)   Final Result   Total right knee arthroplasty with no gross hardware abnormalities.             Assessment:  Active Hospital Problems    Diagnosis Date Noted    Fever [R50.9] 11/04/2024    Leg swelling [M79.89] 11/04/2024    ROSEANN (acute kidney injury) ([facility]) [N17.9] 11/02/2024    Hypotension [I95.9] 11/02/2024    SIRS (systemic inflammatory response syndrome) ([facility]) [R65.10] 11/01/2024    Knee swelling [M25.469] 11/01/2024    Uncontrolled type 2 diabetes mellitus with hyperglycemia ([facility]) [E11.65] 11/01/2024    Difficulty in walking [R26.2] 11/01/2024    Inability to ambulate due to left knee [R26.2] 10/31/2024    Chronic obstructive pulmonary disease (HCC) [J44.9] 05/15/2024       Plan:  Inability to ambulate secondary to knee replacement  Total left knee replacement on 10/29/2024.  -Consult PT/OT/social work for placement  --------OT = 15/24   --------PT = 13/24  -Continue home Percocet 5-325 mg every 6 hours as needed  -------- required 0 dose(s) over previous 24 hours  -Orthopedic surgery consulted (11/1), appreciate recs: Does not believe left TKA  as needed as patient is having more frequent bowel movements  -Positive BM this AM 11/4/24, patient has had roughly 3-4 bowel movements in the past 2 days  -KUB 11/4 with no fecal retention noted      Mild cognitive impairment  -Continue home Aricept 10 mg nightly     FLORIAN  -Nightly CPAP    Febrile episode x2 (11/1 and 11/2)-resolved  And RRT was called overnight as patient had fever up to 101.6 and rigors.  On examination his lower extremity was warm to the touch.  Demerol and Tylenol ordered.  Fluids ordered, blood cultures collected, cefepime and vancomycin started.  Consideration of septic arthritis versus SIRS versus PE.  -Patient with fever up to 101.2F on 11/2 0400, was given Tylenol   -STOPPED cefepime and Vanco 11/4/24 per ID  -Blood cultures: no growth (day 4)  -CTA (11/1): No acute process noted.  Hiatal hernia with likely ingested contents.  -Orthopedic surgery consulted (11/2), appreciate recs:  No ABX necessary for TKA  -ID following, appreciate recs (11/2): ABX stopped 11/4/24 per ID.  Local wound care.  -Recheck CXR 11/4 WNL, no abnormalities            Pain Control: Percocet 5-325 mg every 6 hours as needed (got x 1 11/3/24)  DVT ppx: Eliquis 2.5 mg BID  GI ppx: None  Code Status: Full  Diet: General diet        Disposition: patient would like to go to rehab, PT/OT/SW on, awaiting precert     Roosevelt Mercado MD   Family Medicine Resident PGY-2  11/06/24   7:10 AM

## 2024-11-06 LAB
ALBUMIN SERPL-MCNC: 2 G/DL (ref 3.5–4.7)
ALBUMIN SERPL-MCNC: 2.6 G/DL (ref 3.5–5.2)
ALP SERPL-CCNC: 83 U/L (ref 40–129)
ALPHA1 GLOB SERPL ELPH-MCNC: 0.4 G/DL (ref 0.2–0.4)
ALPHA2 GLOB SERPL ELPH-MCNC: 1 G/DL (ref 0.5–1)
ALT SERPL-CCNC: 62 U/L (ref 0–40)
ANION GAP SERPL CALCULATED.3IONS-SCNC: 10 MMOL/L (ref 7–16)
AST SERPL-CCNC: 68 U/L (ref 0–39)
B-GLOBULIN SERPL ELPH-MCNC: 0.9 G/DL (ref 0.8–1.3)
BASOPHILS # BLD: 0.04 K/UL (ref 0–0.2)
BASOPHILS NFR BLD: 0 % (ref 0–2)
BILIRUB SERPL-MCNC: 0.4 MG/DL (ref 0–1.2)
BUN SERPL-MCNC: 35 MG/DL (ref 6–23)
CALCIUM SERPL-MCNC: 8.4 MG/DL (ref 8.6–10.2)
CHLORIDE SERPL-SCNC: 103 MMOL/L (ref 98–107)
CO2 SERPL-SCNC: 20 MMOL/L (ref 22–29)
CREAT SERPL-MCNC: 1.5 MG/DL (ref 0.7–1.2)
EOSINOPHIL # BLD: 0.08 K/UL (ref 0.05–0.5)
EOSINOPHILS RELATIVE PERCENT: 1 % (ref 0–6)
ERYTHROCYTE [DISTWIDTH] IN BLOOD BY AUTOMATED COUNT: 12.7 % (ref 11.5–15)
GAMMA GLOB SERPL ELPH-MCNC: 0.7 G/DL (ref 0.7–1.6)
GFR, ESTIMATED: 48 ML/MIN/1.73M2
GLUCOSE BLD-MCNC: 207 MG/DL (ref 74–99)
GLUCOSE BLD-MCNC: 241 MG/DL (ref 74–99)
GLUCOSE BLD-MCNC: 248 MG/DL (ref 74–99)
GLUCOSE BLD-MCNC: 298 MG/DL (ref 74–99)
GLUCOSE SERPL-MCNC: 190 MG/DL (ref 74–99)
HCT VFR BLD AUTO: 27.7 % (ref 37–54)
HGB BLD-MCNC: 9.2 G/DL (ref 12.5–16.5)
IMM GRANULOCYTES # BLD AUTO: 0.39 K/UL (ref 0–0.58)
IMM GRANULOCYTES NFR BLD: 3 % (ref 0–5)
LYMPHOCYTES NFR BLD: 1.58 K/UL (ref 1.5–4)
LYMPHOCYTES RELATIVE PERCENT: 11 % (ref 20–42)
MAGNESIUM SERPL-MCNC: 2.3 MG/DL (ref 1.6–2.6)
MCH RBC QN AUTO: 31.7 PG (ref 26–35)
MCHC RBC AUTO-ENTMCNC: 33.2 G/DL (ref 32–34.5)
MCV RBC AUTO: 95.5 FL (ref 80–99.9)
MICROORGANISM SPEC CULT: NORMAL
MICROORGANISM SPEC CULT: NORMAL
MONOCYTES NFR BLD: 1.15 K/UL (ref 0.1–0.95)
MONOCYTES NFR BLD: 8 % (ref 2–12)
NEUTROPHILS NFR BLD: 77 % (ref 43–80)
NEUTS SEG NFR BLD: 10.7 K/UL (ref 1.8–7.3)
P E INTERPRETATION, U: NORMAL
PATHOLOGIST: ABNORMAL
PATHOLOGIST: NORMAL
PHOSPHATE SERPL-MCNC: 2.8 MG/DL (ref 2.5–4.5)
PLATELET # BLD AUTO: 267 K/UL (ref 130–450)
PMV BLD AUTO: 10.3 FL (ref 7–12)
POTASSIUM SERPL-SCNC: 4.4 MMOL/L (ref 3.5–5)
PROT PATTERN SERPL ELPH-IMP: ABNORMAL
PROT SERPL-MCNC: 5.1 G/DL (ref 6.4–8.3)
PROT SERPL-MCNC: 5.8 G/DL (ref 6.4–8.3)
RBC # BLD AUTO: 2.9 M/UL (ref 3.8–5.8)
SERVICE CMNT-IMP: NORMAL
SERVICE CMNT-IMP: NORMAL
SODIUM SERPL-SCNC: 133 MMOL/L (ref 132–146)
SPECIMEN DESCRIPTION: NORMAL
SPECIMEN DESCRIPTION: NORMAL
SPECIMEN TYPE: NORMAL
SPECIMEN TYPE: NORMAL
URINE IFX INTERP: NORMAL
WBC OTHER # BLD: 13.9 K/UL (ref 4.5–11.5)

## 2024-11-06 PROCEDURE — 6370000000 HC RX 637 (ALT 250 FOR IP)

## 2024-11-06 PROCEDURE — 97535 SELF CARE MNGMENT TRAINING: CPT

## 2024-11-06 PROCEDURE — 6360000002 HC RX W HCPCS

## 2024-11-06 PROCEDURE — 80053 COMPREHEN METABOLIC PANEL: CPT

## 2024-11-06 PROCEDURE — 6370000000 HC RX 637 (ALT 250 FOR IP): Performed by: INTERNAL MEDICINE

## 2024-11-06 PROCEDURE — 97530 THERAPEUTIC ACTIVITIES: CPT

## 2024-11-06 PROCEDURE — 99232 SBSQ HOSP IP/OBS MODERATE 35: CPT | Performed by: FAMILY MEDICINE

## 2024-11-06 PROCEDURE — 6370000000 HC RX 637 (ALT 250 FOR IP): Performed by: FAMILY MEDICINE

## 2024-11-06 PROCEDURE — 2580000003 HC RX 258

## 2024-11-06 PROCEDURE — 94640 AIRWAY INHALATION TREATMENT: CPT

## 2024-11-06 PROCEDURE — 83735 ASSAY OF MAGNESIUM: CPT

## 2024-11-06 PROCEDURE — 85025 COMPLETE CBC W/AUTO DIFF WBC: CPT

## 2024-11-06 PROCEDURE — 84100 ASSAY OF PHOSPHORUS: CPT

## 2024-11-06 PROCEDURE — 82962 GLUCOSE BLOOD TEST: CPT

## 2024-11-06 PROCEDURE — 36415 COLL VENOUS BLD VENIPUNCTURE: CPT

## 2024-11-06 PROCEDURE — 1200000000 HC SEMI PRIVATE

## 2024-11-06 PROCEDURE — 94660 CPAP INITIATION&MGMT: CPT

## 2024-11-06 RX ORDER — INSULIN GLARGINE 100 [IU]/ML
15 INJECTION, SOLUTION SUBCUTANEOUS DAILY
Status: DISCONTINUED | OUTPATIENT
Start: 2024-11-06 | End: 2024-11-07

## 2024-11-06 RX ADMIN — BUDESONIDE 500 MCG: 0.5 SUSPENSION RESPIRATORY (INHALATION) at 20:13

## 2024-11-06 RX ADMIN — FUROSEMIDE 40 MG: 40 TABLET ORAL at 10:44

## 2024-11-06 RX ADMIN — PREDNISONE 40 MG: 20 TABLET ORAL at 10:44

## 2024-11-06 RX ADMIN — INSULIN LISPRO 4 UNITS: 100 INJECTION, SOLUTION INTRAVENOUS; SUBCUTANEOUS at 20:45

## 2024-11-06 RX ADMIN — INSULIN LISPRO 2 UNITS: 100 INJECTION, SOLUTION INTRAVENOUS; SUBCUTANEOUS at 06:29

## 2024-11-06 RX ADMIN — IPRATROPIUM BROMIDE AND ALBUTEROL SULFATE 1 DOSE: 2.5; .5 SOLUTION RESPIRATORY (INHALATION) at 09:28

## 2024-11-06 RX ADMIN — ACETAMINOPHEN 650 MG: 325 TABLET ORAL at 12:51

## 2024-11-06 RX ADMIN — IPRATROPIUM BROMIDE AND ALBUTEROL SULFATE 1 DOSE: 2.5; .5 SOLUTION RESPIRATORY (INHALATION) at 20:13

## 2024-11-06 RX ADMIN — ACETAMINOPHEN 650 MG: 325 TABLET ORAL at 18:50

## 2024-11-06 RX ADMIN — ANORECTAL OINTMENT: 15.7; .44; 24; 20.6 OINTMENT TOPICAL at 12:49

## 2024-11-06 RX ADMIN — TAMSULOSIN HYDROCHLORIDE 0.4 MG: 0.4 CAPSULE ORAL at 10:44

## 2024-11-06 RX ADMIN — ANORECTAL OINTMENT: 15.7; .44; 24; 20.6 OINTMENT TOPICAL at 20:45

## 2024-11-06 RX ADMIN — INSULIN LISPRO 2 UNITS: 100 INJECTION, SOLUTION INTRAVENOUS; SUBCUTANEOUS at 16:36

## 2024-11-06 RX ADMIN — ARFORMOTEROL TARTRATE 15 MCG: 15 SOLUTION RESPIRATORY (INHALATION) at 20:13

## 2024-11-06 RX ADMIN — INSULIN GLARGINE 15 UNITS: 100 INJECTION, SOLUTION SUBCUTANEOUS at 10:49

## 2024-11-06 RX ADMIN — ATORVASTATIN CALCIUM 40 MG: 40 TABLET, FILM COATED ORAL at 10:44

## 2024-11-06 RX ADMIN — APIXABAN 2.5 MG: 2.5 TABLET, FILM COATED ORAL at 20:45

## 2024-11-06 RX ADMIN — METOPROLOL SUCCINATE 50 MG: 50 TABLET, EXTENDED RELEASE ORAL at 10:45

## 2024-11-06 RX ADMIN — ARFORMOTEROL TARTRATE 15 MCG: 15 SOLUTION RESPIRATORY (INHALATION) at 09:28

## 2024-11-06 RX ADMIN — DONEPEZIL HYDROCHLORIDE 10 MG: 10 TABLET, FILM COATED ORAL at 20:45

## 2024-11-06 RX ADMIN — SODIUM CHLORIDE, PRESERVATIVE FREE 10 ML: 5 INJECTION INTRAVENOUS at 20:46

## 2024-11-06 RX ADMIN — FOLIC ACID 1 MG: 1 TABLET ORAL at 10:45

## 2024-11-06 RX ADMIN — APIXABAN 2.5 MG: 2.5 TABLET, FILM COATED ORAL at 10:44

## 2024-11-06 RX ADMIN — SODIUM CHLORIDE, PRESERVATIVE FREE 10 ML: 5 INJECTION INTRAVENOUS at 10:45

## 2024-11-06 RX ADMIN — INSULIN LISPRO 2 UNITS: 100 INJECTION, SOLUTION INTRAVENOUS; SUBCUTANEOUS at 11:36

## 2024-11-06 RX ADMIN — BUDESONIDE 500 MCG: 0.5 SUSPENSION RESPIRATORY (INHALATION) at 09:28

## 2024-11-06 RX ADMIN — AMIODARONE HYDROCHLORIDE 200 MG: 200 TABLET ORAL at 10:45

## 2024-11-06 ASSESSMENT — ENCOUNTER SYMPTOMS
SHORTNESS OF BREATH: 0
CONSTIPATION: 0
DIARRHEA: 0
CHEST TIGHTNESS: 0
VOMITING: 0
WHEEZING: 1
COUGH: 1
BLOOD IN STOOL: 0
NAUSEA: 0
ABDOMINAL PAIN: 0

## 2024-11-06 ASSESSMENT — PAIN SCALES - GENERAL
PAINLEVEL_OUTOF10: 3
PAINLEVEL_OUTOF10: 3
PAINLEVEL_OUTOF10: 0

## 2024-11-06 ASSESSMENT — PAIN DESCRIPTION - LOCATION
LOCATION: BACK
LOCATION: BACK

## 2024-11-06 ASSESSMENT — PAIN DESCRIPTION - DESCRIPTORS
DESCRIPTORS: ACHING
DESCRIPTORS: ACHING

## 2024-11-06 ASSESSMENT — PAIN DESCRIPTION - ORIENTATION: ORIENTATION: MID

## 2024-11-06 NOTE — PROGRESS NOTES
Spoke with Dr. Sahni for a [id number] regarding Mr. Gudino placement for acute rehab at [place]. Per our discussion, Mr. Gudino has been denied on the basis that he no longer requires acute rehab due to the fact that he is no longer on IV antibiotics or IV fluids.  I emphasized the patient's need for continued PT/OT and inability to return home, to which I was told that Mr. Gudino would qualify for a subacute rehab stay for strengthening and mobility rather than acute rehab.  Discussed with .   to discuss further options with family.    Tiffani Marina DO  Family Medicine Resident, PGY-3  [facility]  11/6/2024 10:25 AM

## 2024-11-06 NOTE — PROGRESS NOTES
Occupational Therapy  OT BEDSIDE TREATMENT NOTE      Date:2024  Patient Name: Mason Gudino  MRN: [id number]  : 1945  Room: [address] [place] [address]         Evaluating OT: Patrizia Mccormack OTR/L   [id number]       Referring Provider:Mason Reynoso MD     Specific Provider Orders/Date:OT eval and treat 10/31/2024       Diagnosis:  Inability to ambulate due to left knee [R26.2]  Difficulty in walking [R26.2]  History of total left knee replacement [Z96.652]   X Ray L knee; 10/31/2024:  FINDINGS:  No evidence of acute fracture or dislocation.  No focal osseous lesion.  No  evidence of joint effusion. No focal soft tissue abnormality.  Total knee  arthroplasty with no gross hardware abnormalitie    Pertinent Medical History: L TKA 10/29/2024, HTN, R TKA , cardiac      Precautions:  Fall Risk, WBAT L LE       Assessment of current deficits    [x] Functional mobility            [x]ADLs           [x] Strength                  [x]Cognition    [x] Functional transfers          [x] IADLs         [x] Safety Awareness   [x]Endurance    [x] Fine Coordination                         [x] Balance      [] Vision/perception   [x]Sensation      []Gross Motor Coordination             [] ROM           [] Delirium                   [] Motor Control      OT PLAN OF CARE   OT POC based on physician orders, patient diagnosis and results of clinical assessment     Frequency/Duration  2-4 days/wk for 2 - 4weeks PRN   Specific OT Treatment Interventions to include:   ADL retraining/adapted techniques and AE recommendations to increase functional independence within precautions                    Energy conservation techniques to improve tolerance for selfcare routine   Functional transfer/mobility training/DME recommendations for increased independence, safety and fall prevention         Patient/family education to increase safety and functional independence             Environmental modifications for safe mobility and completion of ADLs

## 2024-11-06 NOTE — PROGRESS NOTES
lungs are without acute process.  No focal consolidation or  pulmonary edema.  No evidence of pleural effusion or pneumothorax.    Upper Abdomen: Limited images of the upper abdomen are unremarkable.    Soft Tissues/Bones: No acute bone or soft tissue abnormality.    Impression:      1. No acute pulmonary artery embolism.  2. Status post aortic valve repair without complication.  3. Hiatal hernia with heterogeneous contents throughout the esophagus  extending to the thoracic inlet level. This may relate to ingested contents.  Correlate clinical evidence of gastroesophageal reflux.    Vascular duplex upper extremity venous left [[id number]]   Collected: 11/04/24 1142   Updated: 11/04/24 1145   Narrative:    EXAMINATION:  VENOUS ULTRASOUND OF THE LEFT UPPER EXTREMITY, 11/4/2024 10:54 am    TECHNIQUE:  Duplex ultrasound using B-mode/gray scaled imaging and Doppler spectral  analysis and color flow was obtained of the deep venous structures of the  left upper extremity.    COMPARISON:  None.    HISTORY:  ORDERING SYSTEM PROVIDED HISTORY: Edema left forearm  TECHNOLOGIST PROVIDED HISTORY:  What reading provider will be dictating this exam?->CRC    FINDINGS:  There is normal flow and compressibility of the visualized deep venous  structures.  There is no evidence of echogenic thrombus. The veins  demonstrate good compressibility with normal color flow study and spectral  analysis.    There is superficial thrombus in the left cephalic vein in the proximal  forearm.  Impression:    1. No evidence of DVT.  2. Superficial thrombus in the left cephalic vein in the proximal forearm.        Assessment  1-Stage 1 ROSEANN presumed sec to decreased effective renal perfusion given the Morris+<20 and FeNa <1% in the setting of the fever, transient decrease in BP on 11/1/24 into the low 100's and the IV Contrast for the CTA on 11/1/24  Serum cr trudy from the baseline 1.4-1.5mg/dl up to 1.9mg/dl on 11/2 and 2.3mg/dl on 11/3/24 down to  1.8mg/dl  PLAN:  Follow Labs  Follow I/O's  Follow PVR  Avoid Further Nephrotoxins    2-CKD G3A with a baseline serum cr of 1.3-1.5mg/dl with an e-GFR=46-54ml/min presumed sec to microvascular disease due to HTN and DM2  PLAN:  Monitor    3-Hyponatremia in the setting of the ROSEANN  Na+ 129-->131-->132-->133  PLAN:  Follow Na+     4-Sec HPTH of Renal Origin with Hypocalcemia with Hypoalbuminemia with Unspecified Vit D Def 11.4, PTH 58.1  Ca++ 8.4 with Albumin 2.6  PLAN:  Continue  Vit D supplement  Follow Ca++, PO4, Mg++    5- Anemia in the post op setting  B12 746, Ferritin 1146 with Iron sat 20%, Folate 3.3(low)  Pre-Op HgB 12.8 WNL  HgB 9.2  PLAN:  Continue  Folic acid  supplement  Follow CBC  Transfuse for HgB <7.0    6- HTN with CKD I-IV  BP goal <130/80(ACC/AHA Target)-at goal  PLAN:  Follow on the metoprolol    Thank you for allowing us to participate in care of Mason Johnston MD  5:56 PM  11/6/2024

## 2024-11-06 NOTE — PLAN OF CARE
Problem: ABCDS Injury Assessment  Goal: Absence of physical injury  11/5/2024 2126 by Elizabeth Lindsey, RN  Outcome: Progressing  11/5/2024 1057 by Betsy Boudreaux RN  Outcome: Progressing     Problem: Skin/Tissue Integrity  Goal: Absence of new skin breakdown  Description: 1.  Monitor for areas of redness and/or skin breakdown  2.  Assess vascular access sites hourly  3.  Every 4-6 hours minimum:  Change oxygen saturation probe site  4.  Every 4-6 hours:  If on nasal continuous positive airway pressure, respiratory therapy assess nares and determine need for appliance change or resting period.  11/5/2024 2126 by Elizabeth Lindsey, RN  Outcome: Progressing  11/5/2024 1057 by Betsy Boudreaux RN  Outcome: Progressing     Problem: Discharge Planning  Goal: Discharge to home or other facility with appropriate resources  11/5/2024 2126 by Elizabeth Lindsey, RN  Outcome: Progressing  11/5/2024 1057 by Betsy Boudreaux RN  Outcome: Progressing  Flowsheets (Taken 11/4/2024 2138 by Elizabeth Lindsey, RN)  Discharge to home or other facility with appropriate resources: Refer to discharge planning if patient needs post-hospital services based on physician order or complex needs related to functional status, cognitive ability or social support system     Problem: Chronic Conditions and Co-morbidities  Goal: Patient's chronic conditions and co-morbidity symptoms are monitored and maintained or improved  11/5/2024 2126 by Elizabeth Lindsey, RN  Outcome: Progressing  11/5/2024 1057 by Betsy Boudreaux RN  Outcome: Progressing     Problem: Safety - Adult  Goal: Free from fall injury  11/5/2024 2126 by Elizabeth Lindsey, RN  Outcome: Progressing  11/5/2024 1057 by Betsy Boudreaux RN  Outcome: Progressing

## 2024-11-06 NOTE — PROGRESS NOTES
P Quality Flow/Interdisciplinary Rounds Progress Note        Quality Flow Rounds held on November 6, 2024    Disciplines Attending:  Bedside Nurse, , , and Nursing Unit Leadership    Mason Gudino was admitted on 10/31/2024  4:14 PM    Anticipated Discharge Date:       Disposition:    Jace Score:  Jace Scale Score: 16    Readmission Risk              Risk of Unplanned Readmission:  23           Discussed patient goal for the day, patient clinical progression, and barriers to discharge.  The following Goal(s) of the Day/Commitment(s) have been identified:   Discharge planning.      Mae Garcia RN  November 6, 2024

## 2024-11-06 NOTE — PROGRESS NOTES
[facility]  JOHNATHAN  Progress Note    SUBJECTIVE:  Chief Complaint   Patient presents with    Gait Problem     Left knee replacement on tuesday; having trouble getting around at home; denies any injuries; wants to have rehab placement      Patient is sitting up in bed, watching a movie   Says he is fair today  Asking to get up to the chair   No fevers     Review of systems:  As stated above in the chief complaint, otherwise negative.    Medications:  Scheduled Meds:   insulin glargine  15 Units SubCUTAneous Daily    ipratropium 0.5 mg-albuterol 2.5 mg  1 Dose Inhalation Q4H WA RT    predniSONE  40 mg Oral Daily    furosemide  40 mg Oral Daily    vitamin D  50,000 Units Oral Weekly    folic acid  1 mg Oral Daily    budesonide  0.5 mg Nebulization BID RT    insulin lispro  0-8 Units SubCUTAneous 4x Daily AC & HS    white petrolatum   Topical BID    amiodarone  200 mg Oral QAM    apixaban  2.5 mg Oral BID    atorvastatin  40 mg Oral Daily    donepezil  10 mg Oral Nightly    metoprolol succinate  50 mg Oral QAM    tamsulosin  0.4 mg Oral QAM    sodium chloride flush  5-40 mL IntraVENous 2 times per day    arformoterol tartrate  15 mcg Nebulization BID RT     Continuous Infusions:   sodium chloride      dextrose       PRN Meds:sennosides-docusate sodium, polyethylene glycol, sodium chloride flush, sodium chloride, ondansetron **OR** ondansetron, acetaminophen **OR** acetaminophen, oxyCODONE-acetaminophen, glucose, dextrose bolus **OR** dextrose bolus, glucagon (rDNA), dextrose    OBJECTIVE:  BP (!) 120/54   Pulse 60   Temp 97.9 °F (36.6 °C) (Oral)   Resp 16   Ht 1.803 m (5' 11\")   Wt 116.1 kg (256 lb)   SpO2 99%   BMI 35.70 kg/m²   Temp  Av.1 °F (36.7 °C)  Min: 97.9 °F (36.6 °C)  Max: 98.3 °F (36.8 °C)  Constitutional: The patient is awake, alert, and oriented.  Sitting up in bed. In no distress.  Skin: Warm and dry. No rashes were noted.   HEENT: Round and reactive pupils.   Moist mucous membranes.  No ulcerations or thrush.  Neck: Supple to movements.   Chest: No use of accessory muscles to breathe. Symmetrical expansion. Diminished air movement   Cardiovascular: S1 and S2 are rhythmic and regular. No murmurs appreciated.   Abdomen: Positive bowel sounds to auscultation. Benign to palpation. No masses felt.  Extremities: left knee aquacell dressing intact - no drainage noted. Knee is warm & edematous but there is no erythema noted. Generalized edema, 2+ BLE edema.   Lines: Peripheral.    Laboratory and Tests:  Lab Results   Component Value Date    .0 (H) 11/01/2024     Lab Results   Component Value Date    SEDRATE 53 (H) 11/01/2024       Radiology:  Reviewed     Microbiology  Blood cultures 11/1: negative so far  MRSA NS: negative  RVP: negative  Urine culture:cancelled? UA clean    Assessment:  Fever, etiology unclear, resolved  Doubt surgical site infection or septic joint left knee   Left knee osteoarthritis, s/p left TKA on 10/29  Leukocytosis     Plan:  Observe off antibiotics - stopped 11/4  Labs and cultures reviewed  Discharge dispo: [facility] to discharge from ID standpoint    WALI Cook - CNP  10:01 AM  11/6/2024    Patient seen and examined. I had a face to face encounter with the patient. Agree with exam.  Assessment and plan as outlined above and directed by me. Addition and corrections were done as deemed appropriate. My exam and plan include: Patient is doing well.  No new complaints.  He is off antibiotics.  No further recommendations at this time.  ID is signed off.    Nir Godinez MD  11/6/2024  1:53 PM

## 2024-11-06 NOTE — PROGRESS NOTES
Wound / ostomy dept consulted for this Pt admitted for Inability to ambulate due to L knee. Pt had a L TKA.  Inner buttocks denuded due to IAD and moisture. Recommend Calmoseptine topically.  He is on a Fady bed with SOS precautions.  Heels are intact.  Inner Buttocks  **Informed Consent**    The patient has given verbal consent to have photos taken of Inner buttocks and inserted into their chart as part of their permanent medical record for purposes of documentation, treatment management and/or medical review.   All Images taken on 11/6/24 of patient name: Mason Gudino were transmitted and stored on secured Epic  Site located within Media Folder Tab by a registered Epic-Haiku Mobile Application Device.

## 2024-11-06 NOTE — DISCHARGE SUMMARY
[facility]  Discharge Summary      Patient ID:  Mason Gudino  [id number]  79 y.o.  1945    Admit date: 10/31/2024    Discharge date and time: 11/7/24    Location of discharge: [facility]     Admitting Physician: Marlo Chappell MD     Discharge Physician: Jeremy Lipscomb*    Consults: ID, nephrology, and orthopedic surgery    Admission Diagnoses: Inability to ambulate due to left knee [R26.2]  Difficulty in walking [R26.2]  History of total left knee replacement [Z96.652]    Discharge Diagnoses: Principal Problem:    Inability to ambulate due to left knee  Active Problems:    Chronic obstructive pulmonary disease (HCC)    Knee swelling    Uncontrolled type 2 diabetes mellitus with hyperglycemia (HCC)    Difficulty in walking    Leg swelling  Resolved Problems:    SIRS (systemic inflammatory response syndrome) (HCC)    ROSEANN (acute kidney injury) (HCC)    Hypotension    Fever      Hospital Course:   Mason Gudino  is a 79 y.o. male patient of Abhinav Elkins MD  with a pertinent PMHx of paroxysmal A-fib, CAD status post stent, aortic stenosis, CKD, hypertension, hyperlipidemia, FLORIAN presented to the ER from home with daughter and her boyfriend with chief complaint of gait problem.  Patient had left knee replacement 10/30/24 and had inability to ambulate since.  Patient was initially admitted to the hospital for placement to rehab facility.  On the first day of hospitalization though patient did develop a fever up to 101.6 degrees as well as rigors.  At that time he was diffusely warm to touch, Demerol and Tylenol were given.  He was started on broad-spectrum antibiotics cefepime and vancomycin and both infectious disease and orthopedic surgery were consulted as there was concern for potential septic joint seeing that he just had a joint replacement.  Extensive lab work to look for other infections including urinalysis and chest x-ray  nurse or doctor about these medications  budesonide 90 MCG/ACT Aepb inhaler  folic acid 1 MG tablet  glucose 4 g chewable tablet  Lantus SoloStar 100 UNIT/ML injection pen  Lantus SoloStar 100 UNIT/ML injection pen  menthol-zinc oxide 0.44-20.6 % Oint ointment  polyethylene glycol 17 g packet  predniSONE 20 MG tablet  Vitamin D (Ergocalciferol) 19842 units Caps         Activity: activity as tolerated with help of PT  Diet: diabetic diet    Lázaro Blackburn MD  [address]  [place] [id number]  [phone]    Go on 11/8/2024  scheduled 8:10 appointment, Post opperative appointment    Abhinav Elkins MD  [address]  [place] [id number] [phone]    Schedule an appointment as soon as possible for a visit  hospital follow up    Gabby Johnston MD  [address] [facility] [address] [phone]    Schedule an appointment as soon as possible for a visit  hospital follow up    [place] [id number] Tyrese Argueta.  [facility]  [phone]          Signed:  Roosevelt Mercado MD  Family Medicine Resident PGY-2  11/7/2024  11:46 AM

## 2024-11-06 NOTE — CARE COORDINATION
Updated plan of care. [facility] requesting peer to peer that was done by 12 noon today. Pt declined. Spoke with son, Mason and confirmed [facility] for rehab. Awaiting updated therapies and will initiate pre-cert. Will need paperwork done. Will follow-mjo

## 2024-11-06 NOTE — PROGRESS NOTES
Intake --   Output 3385 ml   Net -3385 ml     URINARY CATHETER OUTPUT (Yen):  External Urinary Catheter-Output (mL): 200 mL  [REMOVED] Urinary Catheter 11/02/24 Yen-Output (mL): 300 mL  DRAIN/TUBE OUTPUT:     VENT SETTINGS:     Additional Respiratory Assessments  Pulse: 66  Respirations: 16  SpO2: 99 %  Skin: no rash, turgor wnl  Heent:  eomi, mmm  Neck: no bruits or jvd noted  Cardiovascular: PMI not lat displaced  S1, S2 without S3 or a rub  Respiratory: CTA B without w/r/r  Abdomen:  +bs, soft, nt, nd  Ext: 2+ L lower extremity edema and 1+ R LE edema  Psychiatric: mood and affect appropriate      Data:   Labs:  CBC:   Lab Results   Component Value Date/Time    WBC 11.4 11/05/2024 06:10 AM    RBC 2.93 11/05/2024 06:10 AM    HGB 9.4 11/05/2024 06:10 AM    HCT 28.7 11/05/2024 06:10 AM    MCV 98.0 11/05/2024 06:10 AM    MCH 32.1 11/05/2024 06:10 AM    MCHC 32.8 11/05/2024 06:10 AM    RDW 13.0 11/05/2024 06:10 AM     11/05/2024 06:10 AM    MPV 10.4 11/05/2024 06:10 AM     CBC with Differential:    Lab Results   Component Value Date/Time    WBC 11.4 11/05/2024 06:10 AM    RBC 2.93 11/05/2024 06:10 AM    HGB 9.4 11/05/2024 06:10 AM    HCT 28.7 11/05/2024 06:10 AM     11/05/2024 06:10 AM    MCV 98.0 11/05/2024 06:10 AM    MCH 32.1 11/05/2024 06:10 AM    MCHC 32.8 11/05/2024 06:10 AM    RDW 13.0 11/05/2024 06:10 AM    LYMPHOPCT 13 11/05/2024 06:10 AM    MONOPCT 10 11/05/2024 06:10 AM    EOSPCT 5 11/05/2024 06:10 AM    BASOPCT 0 11/05/2024 06:10 AM    MONOSABS 1.13 11/05/2024 06:10 AM    LYMPHSABS 1.52 11/05/2024 06:10 AM    EOSABS 0.55 11/05/2024 06:10 AM    BASOSABS 0.03 11/05/2024 06:10 AM     Hemoglobin/Hematocrit:    Lab Results   Component Value Date/Time    HGB 9.4 11/05/2024 06:10 AM    HCT 28.7 11/05/2024 06:10 AM     CMP:    Lab Results   Component Value Date/Time     11/05/2024 06:10 AM    K 4.2 11/05/2024 06:10 AM     11/05/2024 06:10 AM    CO2 19 11/05/2024 06:10 AM    BUN 37  with an e-GFR=46-54ml/min presumed sec to microvascular disease due to HTN and DM2  PLAN:  Monitor    3-Hyponatremia in the setting of the ROSEANN  Na+ 129-->131-->132  PLAN:  Follow Na+     4-Sec HPTH of Renal Origin with Hypocalcemia with Hypoalbuminemia with Unspecified Vit D Def 11.4, PTH 58.1  Ca++ 8.2 with Albumin 2.6  PLAN:  Start Vit D supplement  Follow Ca++, PO4, Mg++    5- Anemia in the post op setting  B12 746, Ferritin 1146 with Iron sat 20%, Folate 3.3(low)  Pre-Op HgB 12.8 WNL  HgB 9.4  PLAN:  Start Folic acid  supplement  Follow CBC  Transfuse for HgB <7.0    6- HTN with CKD I-IV  BP goal <130/80(ACC/AHA Target)-at goal  PLAN:  Follow on the metoprolol    Thank you for allowing us to participate in care of Mason Johnston MD  7:43 PM  11/5/2024

## 2024-11-06 NOTE — PROGRESS NOTES
Orthopedic surgery    Patient seen and examined.  He is sitting up in his chair today.  Knee is bending easily to 90 degrees.  He has no complaints of knee pain today    On exam, his Aquacel is intact without drainage.  Lower tibial pin site dressing intact without drainage.  There is lymphedema of the leg which is his baseline.  There is no obvious effusion or excessive swelling.    Lab work and imaging reviewed    Assessment: Status post left total knee arthroplasty readmitted for placement    He looks good in terms of his knee.  We will plan on Aquacel removal and suture removal prior to his discharge as we were planning to see him Friday in the office for this.  He continues to be stable for discharge from an orthopedic standpoint.    Lázaro Blackburn MD  [facility]

## 2024-11-06 NOTE — PLAN OF CARE
Problem: ABCDS Injury Assessment  Goal: Absence of physical injury  11/5/2024 2126 by Elizabeth Lindsey RN  Outcome: Progressing  11/5/2024 2126 by Elizabeth Lindsey RN  Outcome: Progressing  11/5/2024 1057 by Betsy Boudreaux RN  Outcome: Progressing     Problem: Skin/Tissue Integrity  Goal: Absence of new skin breakdown  Description: 1.  Monitor for areas of redness and/or skin breakdown  2.  Assess vascular access sites hourly  3.  Every 4-6 hours minimum:  Change oxygen saturation probe site  4.  Every 4-6 hours:  If on nasal continuous positive airway pressure, respiratory therapy assess nares and determine need for appliance change or resting period.  11/5/2024 2126 by Elizabeth Lindsey RN  Outcome: Progressing  11/5/2024 2126 by Elizabeth Lindsey RN  Outcome: Progressing  11/5/2024 1057 by Betsy Boudreaux RN  Outcome: Progressing     Problem: Discharge Planning  Goal: Discharge to home or other facility with appropriate resources  11/5/2024 2126 by Elizabeth Lindsey RN  Outcome: Progressing  11/5/2024 2126 by Elizabeth Lindsey RN  Outcome: Progressing  11/5/2024 1057 by Betsy Boudreaux RN  Outcome: Progressing  Flowsheets (Taken 11/4/2024 2138 by Elizabeth Lindsey, RN)  Discharge to home or other facility with appropriate resources: Refer to discharge planning if patient needs post-hospital services based on physician order or complex needs related to functional status, cognitive ability or social support system     Problem: Chronic Conditions and Co-morbidities  Goal: Patient's chronic conditions and co-morbidity symptoms are monitored and maintained or improved  11/5/2024 2126 by Elizabeth Lindsey RN  Outcome: Progressing  11/5/2024 2126 by Elizabeth Lindsey RN  Outcome: Progressing  11/5/2024 1057 by Betsy Boudreaux RN  Outcome: Progressing     Problem: Safety - Adult  Goal: Free from fall injury  11/5/2024 2126 by Elizabeth Lindsey RN  Outcome:

## 2024-11-06 NOTE — PROGRESS NOTES
Daily Treat       Evaluating PT:  Ben Rivas PT     Room #:  0732/0732-A  Diagnosis:  Hx LT TKA 10/29/24, Diff walking   Pertinent PMHx/PSHx:  See PMH  Procedure/Surgery:  LT TKA 10/29/24  home and back to hospital   Precautions:  Falls, WBAT  Equipment Needs:  WC for indep mobility and WW with assistance x 2     SUBJECTIVE:     Pt lives with Dtr in a 1 story home with 1 mini stairs and 0 rail to enter.    Pt ambulated with SC prior to LT TKA PTA.  Reports fall history prior to LT TKA using SC.     OBJECTIVE:    Initial Evaluation  Date: 11/2/24 Treatment  11/6/24 Short Term/ Long Term   Goals   Was pt agreeable to Eval/treatment? Yes Yes      Does pt have pain? LT knee LT knee      Bed Mobility   NA-in chair on arrival.  Aids report Mod/Max x 2  NT Mod assistance or <   Transfers Sit to stand toilet: Max x 2  Stand to sit: Min/Mod  Stand pivot: Sit to stand: Min A  Stand to sit: Min A Mod x 2   Ambulation   10 feet with WW Min/close CGA x 2 AAT, moderate/high fall risks 90 feet with WW CGA  25-40 feet with WW Min x 2    Stair negotiation: ascended and descended  NA 6\" platform step with WW Min A Rehab to address   ROM LT knee -10*-90*   LT Knee   MMT Limited efforts/unreliable, 3/5 via AROM   BLE's 1 gr   AM-PAC 6 Clicks 13/24 14/24      Pt is alert, following instruction, states feels weak  Balance: fair minus dynamic with WW    Pt performed therapeutic exercise of the following: Pt found performing pillow case slides on his own while approached in the room, was encouraged to continue with this as able    Patient education/treatment  Pt was educated on UE usage transfer safety, gait mechanics for posture/staying within WW base of support, platform step negotiation    Patient response to education:   Pt verbalized understanding Pt demonstrated skill Pt requires further education in this area   yes With prompt transfers yes     ASSESSMENT:   Comments: Pt found in a bedside chair, agreed to Rx. Gait slow,

## 2024-11-07 ENCOUNTER — CARE COORDINATION (OUTPATIENT)
Dept: CARE COORDINATION | Age: 79
End: 2024-11-07

## 2024-11-07 VITALS
DIASTOLIC BLOOD PRESSURE: 52 MMHG | WEIGHT: 256 LBS | RESPIRATION RATE: 24 BRPM | TEMPERATURE: 97.5 F | SYSTOLIC BLOOD PRESSURE: 123 MMHG | HEIGHT: 71 IN | HEART RATE: 58 BPM | BODY MASS INDEX: 35.84 KG/M2 | OXYGEN SATURATION: 98 %

## 2024-11-07 PROBLEM — I95.9 HYPOTENSION: Status: RESOLVED | Noted: 2024-11-02 | Resolved: 2024-11-07

## 2024-11-07 PROBLEM — R50.9 FEVER: Status: RESOLVED | Noted: 2024-11-04 | Resolved: 2024-11-07

## 2024-11-07 PROBLEM — N17.9 AKI (ACUTE KIDNEY INJURY) (HCC): Status: RESOLVED | Noted: 2024-11-02 | Resolved: 2024-11-07

## 2024-11-07 PROBLEM — R65.10 SIRS (SYSTEMIC INFLAMMATORY RESPONSE SYNDROME) (HCC): Status: RESOLVED | Noted: 2024-11-01 | Resolved: 2024-11-07

## 2024-11-07 LAB
ALBUMIN SERPL-MCNC: 2.7 G/DL (ref 3.5–5.2)
ALP SERPL-CCNC: 76 U/L (ref 40–129)
ALT SERPL-CCNC: 63 U/L (ref 0–40)
ANION GAP SERPL CALCULATED.3IONS-SCNC: 11 MMOL/L (ref 7–16)
AST SERPL-CCNC: 43 U/L (ref 0–39)
BASOPHILS # BLD: 0.07 K/UL (ref 0–0.2)
BASOPHILS NFR BLD: 1 % (ref 0–2)
BILIRUB SERPL-MCNC: 0.4 MG/DL (ref 0–1.2)
BUN SERPL-MCNC: 32 MG/DL (ref 6–23)
CALCIUM SERPL-MCNC: 8.4 MG/DL (ref 8.6–10.2)
CHLORIDE SERPL-SCNC: 102 MMOL/L (ref 98–107)
CO2 SERPL-SCNC: 22 MMOL/L (ref 22–29)
CREAT SERPL-MCNC: 1.4 MG/DL (ref 0.7–1.2)
EKG ATRIAL RATE: 52 BPM
EKG P AXIS: 56 DEGREES
EKG P-R INTERVAL: 202 MS
EKG Q-T INTERVAL: 494 MS
EKG QRS DURATION: 106 MS
EKG QTC CALCULATION (BAZETT): 459 MS
EKG R AXIS: -12 DEGREES
EKG T AXIS: 20 DEGREES
EKG VENTRICULAR RATE: 52 BPM
EOSINOPHIL # BLD: 0.1 K/UL (ref 0.05–0.5)
EOSINOPHILS RELATIVE PERCENT: 1 % (ref 0–6)
ERYTHROCYTE [DISTWIDTH] IN BLOOD BY AUTOMATED COUNT: 12.8 % (ref 11.5–15)
GFR, ESTIMATED: 51 ML/MIN/1.73M2
GLUCOSE BLD-MCNC: 184 MG/DL (ref 74–99)
GLUCOSE BLD-MCNC: 198 MG/DL (ref 74–99)
GLUCOSE BLD-MCNC: 206 MG/DL (ref 74–99)
GLUCOSE SERPL-MCNC: 192 MG/DL (ref 74–99)
HCT VFR BLD AUTO: 29.1 % (ref 37–54)
HGB BLD-MCNC: 9.4 G/DL (ref 12.5–16.5)
IMM GRANULOCYTES # BLD AUTO: 0.38 K/UL (ref 0–0.58)
IMM GRANULOCYTES NFR BLD: 3 % (ref 0–5)
LYMPHOCYTES NFR BLD: 1.74 K/UL (ref 1.5–4)
LYMPHOCYTES RELATIVE PERCENT: 11 % (ref 20–42)
MAGNESIUM SERPL-MCNC: 2.4 MG/DL (ref 1.6–2.6)
MCH RBC QN AUTO: 31.5 PG (ref 26–35)
MCHC RBC AUTO-ENTMCNC: 32.3 G/DL (ref 32–34.5)
MCV RBC AUTO: 97.7 FL (ref 80–99.9)
MONOCYTES NFR BLD: 1.04 K/UL (ref 0.1–0.95)
MONOCYTES NFR BLD: 7 % (ref 2–12)
NEUTROPHILS NFR BLD: 78 % (ref 43–80)
NEUTS SEG NFR BLD: 11.92 K/UL (ref 1.8–7.3)
PHOSPHATE SERPL-MCNC: 3.2 MG/DL (ref 2.5–4.5)
PLATELET # BLD AUTO: 298 K/UL (ref 130–450)
PMV BLD AUTO: 10.3 FL (ref 7–12)
POTASSIUM SERPL-SCNC: 4.5 MMOL/L (ref 3.5–5)
PROT SERPL-MCNC: 5.7 G/DL (ref 6.4–8.3)
RBC # BLD AUTO: 2.98 M/UL (ref 3.8–5.8)
SODIUM SERPL-SCNC: 135 MMOL/L (ref 132–146)
WBC OTHER # BLD: 15.3 K/UL (ref 4.5–11.5)

## 2024-11-07 PROCEDURE — 94660 CPAP INITIATION&MGMT: CPT

## 2024-11-07 PROCEDURE — 93005 ELECTROCARDIOGRAM TRACING: CPT

## 2024-11-07 PROCEDURE — 93010 ELECTROCARDIOGRAM REPORT: CPT | Performed by: INTERNAL MEDICINE

## 2024-11-07 PROCEDURE — 83735 ASSAY OF MAGNESIUM: CPT

## 2024-11-07 PROCEDURE — 97530 THERAPEUTIC ACTIVITIES: CPT

## 2024-11-07 PROCEDURE — 6370000000 HC RX 637 (ALT 250 FOR IP)

## 2024-11-07 PROCEDURE — 94640 AIRWAY INHALATION TREATMENT: CPT

## 2024-11-07 PROCEDURE — 6370000000 HC RX 637 (ALT 250 FOR IP): Performed by: INTERNAL MEDICINE

## 2024-11-07 PROCEDURE — 84100 ASSAY OF PHOSPHORUS: CPT

## 2024-11-07 PROCEDURE — 82962 GLUCOSE BLOOD TEST: CPT

## 2024-11-07 PROCEDURE — 6360000002 HC RX W HCPCS

## 2024-11-07 PROCEDURE — 99238 HOSP IP/OBS DSCHRG MGMT 30/<: CPT | Performed by: FAMILY MEDICINE

## 2024-11-07 PROCEDURE — 97110 THERAPEUTIC EXERCISES: CPT

## 2024-11-07 PROCEDURE — 80053 COMPREHEN METABOLIC PANEL: CPT

## 2024-11-07 PROCEDURE — 85025 COMPLETE CBC W/AUTO DIFF WBC: CPT

## 2024-11-07 PROCEDURE — 2580000003 HC RX 258

## 2024-11-07 PROCEDURE — 36415 COLL VENOUS BLD VENIPUNCTURE: CPT

## 2024-11-07 RX ORDER — INSULIN GLARGINE 100 [IU]/ML
10 INJECTION, SOLUTION SUBCUTANEOUS DAILY
DISCHARGE
Start: 2024-11-12

## 2024-11-07 RX ORDER — PREDNISONE 20 MG/1
40 TABLET ORAL DAILY
Qty: 4 TABLET | Refills: 0 | DISCHARGE
Start: 2024-11-08 | End: 2024-11-10

## 2024-11-07 RX ORDER — ERGOCALCIFEROL 1.25 MG/1
50000 CAPSULE ORAL WEEKLY
DISCHARGE
Start: 2024-11-12

## 2024-11-07 RX ORDER — POLYETHYLENE GLYCOL 3350 17 G/17G
17 POWDER, FOR SOLUTION ORAL DAILY PRN
DISCHARGE
Start: 2024-11-07 | End: 2024-12-07

## 2024-11-07 RX ORDER — INSULIN GLARGINE 100 [IU]/ML
20 INJECTION, SOLUTION SUBCUTANEOUS DAILY
DISCHARGE
Start: 2024-11-08 | End: 2024-11-12

## 2024-11-07 RX ORDER — INSULIN GLARGINE 100 [IU]/ML
20 INJECTION, SOLUTION SUBCUTANEOUS DAILY
Status: DISCONTINUED | OUTPATIENT
Start: 2024-11-07 | End: 2024-11-07 | Stop reason: HOSPADM

## 2024-11-07 RX ORDER — OXYCODONE AND ACETAMINOPHEN 5; 325 MG/1; MG/1
1 TABLET ORAL EVERY 6 HOURS PRN
Qty: 28 TABLET | Refills: 0 | Status: SHIPPED | OUTPATIENT
Start: 2024-11-07 | End: 2024-11-14

## 2024-11-07 RX ORDER — METOPROLOL SUCCINATE 25 MG/1
25 TABLET, EXTENDED RELEASE ORAL EVERY MORNING
Qty: 30 TABLET | Refills: 3 | Status: SHIPPED | OUTPATIENT
Start: 2024-11-08

## 2024-11-07 RX ORDER — METOPROLOL SUCCINATE 25 MG/1
25 TABLET, EXTENDED RELEASE ORAL EVERY MORNING
Status: DISCONTINUED | OUTPATIENT
Start: 2024-11-07 | End: 2024-11-07 | Stop reason: HOSPADM

## 2024-11-07 RX ORDER — FOLIC ACID 1 MG/1
1 TABLET ORAL DAILY
DISCHARGE
Start: 2024-11-08

## 2024-11-07 RX ADMIN — BUDESONIDE 500 MCG: 0.5 SUSPENSION RESPIRATORY (INHALATION) at 07:44

## 2024-11-07 RX ADMIN — ANORECTAL OINTMENT: 15.7; .44; 24; 20.6 OINTMENT TOPICAL at 09:30

## 2024-11-07 RX ADMIN — ATORVASTATIN CALCIUM 40 MG: 40 TABLET, FILM COATED ORAL at 09:22

## 2024-11-07 RX ADMIN — TAMSULOSIN HYDROCHLORIDE 0.4 MG: 0.4 CAPSULE ORAL at 09:22

## 2024-11-07 RX ADMIN — INSULIN LISPRO 2 UNITS: 100 INJECTION, SOLUTION INTRAVENOUS; SUBCUTANEOUS at 11:55

## 2024-11-07 RX ADMIN — SODIUM CHLORIDE, PRESERVATIVE FREE 10 ML: 5 INJECTION INTRAVENOUS at 09:24

## 2024-11-07 RX ADMIN — FUROSEMIDE 40 MG: 40 TABLET ORAL at 09:22

## 2024-11-07 RX ADMIN — METOPROLOL SUCCINATE 25 MG: 50 TABLET, EXTENDED RELEASE ORAL at 09:23

## 2024-11-07 RX ADMIN — INSULIN GLARGINE 20 UNITS: 100 INJECTION, SOLUTION SUBCUTANEOUS at 09:34

## 2024-11-07 RX ADMIN — OXYCODONE HYDROCHLORIDE AND ACETAMINOPHEN 1 TABLET: 5; 325 TABLET ORAL at 09:22

## 2024-11-07 RX ADMIN — PREDNISONE 40 MG: 20 TABLET ORAL at 09:22

## 2024-11-07 RX ADMIN — INSULIN LISPRO 2 UNITS: 100 INJECTION, SOLUTION INTRAVENOUS; SUBCUTANEOUS at 07:33

## 2024-11-07 RX ADMIN — IPRATROPIUM BROMIDE AND ALBUTEROL SULFATE 1 DOSE: 2.5; .5 SOLUTION RESPIRATORY (INHALATION) at 07:44

## 2024-11-07 RX ADMIN — IPRATROPIUM BROMIDE AND ALBUTEROL SULFATE 1 DOSE: 2.5; .5 SOLUTION RESPIRATORY (INHALATION) at 12:19

## 2024-11-07 RX ADMIN — FOLIC ACID 1 MG: 1 TABLET ORAL at 09:22

## 2024-11-07 RX ADMIN — AMIODARONE HYDROCHLORIDE 200 MG: 200 TABLET ORAL at 09:22

## 2024-11-07 RX ADMIN — ARFORMOTEROL TARTRATE 15 MCG: 15 SOLUTION RESPIRATORY (INHALATION) at 07:44

## 2024-11-07 RX ADMIN — DOCUSATE SODIUM 50 MG AND SENNOSIDES 8.6 MG 2 TABLET: 8.6; 5 TABLET, FILM COATED ORAL at 11:55

## 2024-11-07 RX ADMIN — APIXABAN 2.5 MG: 2.5 TABLET, FILM COATED ORAL at 09:22

## 2024-11-07 ASSESSMENT — ENCOUNTER SYMPTOMS
WHEEZING: 1
SHORTNESS OF BREATH: 0
CHEST TIGHTNESS: 0
NAUSEA: 0
BLOOD IN STOOL: 0
ABDOMINAL PAIN: 0
COUGH: 1
VOMITING: 0
CONSTIPATION: 0
DIARRHEA: 0

## 2024-11-07 ASSESSMENT — PAIN SCALES - GENERAL
PAINLEVEL_OUTOF10: 0
PAINLEVEL_OUTOF10: 5

## 2024-11-07 ASSESSMENT — PAIN DESCRIPTION - LOCATION: LOCATION: BUTTOCKS;KNEE

## 2024-11-07 NOTE — PROGRESS NOTES
Notified by nurses regarding bradycardia being between the 40s and 50s.  Patient asymptomatic.  Alert and oriented.    Plan  EKG  Monitor heart rate  If persistent, consider reducing Toprol-XL to 25 Mg daily in the morning.      EKG showed bradycardia with irregular heart rate with incomplete left bundle branch block.    Informed the nurses to notify if heart rate lowers of the patient becomes symptomatic.

## 2024-11-07 NOTE — PROGRESS NOTES
[facility] Quality Flow/Interdisciplinary Rounds Progress Note        Quality Flow Rounds held on November 7, 2024    Disciplines Attending:  Bedside Nurse, , , Nursing Unit Leadership, and      Mason Del Rios was admitted on 10/31/2024  4:14 PM    Anticipated Discharge Date:   11/8/204    Disposition: Skilled Nursing Facility     Jace Score:  Jace Scale Score: 14    Readmission Risk              Risk of Unplanned Readmission:  23           Discussed patient goal for the day, patient clinical progression, and barriers to discharge.  The following Goal(s) of the Day/Commitment(s) have been identified:  SNF Discharge - Check H&P Update, Medication Reconciliation, and Transfer Form      Rosa Don RN  November 7, 2024

## 2024-11-07 NOTE — PROGRESS NOTES
ORTHOPAEDIC SURGERY  Progress Note    CC: post op     Subjective: Patient is alert and oriented x3, calm and cooperative showing no signs of acute distress. Currently sitting in bedside chair. Reports pain well controlled. Anticipates discharge to [facility] later today.    Vitals  VITALS:  BP (!) 123/52   Pulse 58   Temp 97.5 °F (36.4 °C) (Oral)   Resp 24   Ht 1.803 m (5' 11\")   Wt 116.1 kg (256 lb)   SpO2 98%   BMI 35.70 kg/m²   24HR INTAKE/OUTPUT:    Intake/Output Summary (Last 24 hours) at 11/7/2024 1323  Last data filed at 11/7/2024 1115  Gross per 24 hour   Intake --   Output 3750 ml   Net -3750 ml       PHYSICAL EXAM:    Orientation:  alert and oriented to person, place and time    Left Lower Extremity    Incision:  dressing in place, clean, dry and intact    Lower Extremity Motor :  Dorsiflexion:  5/5  Plantarflexion:  5/5  EHL:  5/5    Lower Extremity Sensory: intact L4-S1 distribution     Pulses:  Foot warm/well perfused    Abnormal Exam findings:  none      LABS:    CBC:   Lab Results   Component Value Date/Time    WBC 15.3 11/07/2024 05:55 AM    RBC 2.98 11/07/2024 05:55 AM    HGB 9.4 11/07/2024 05:55 AM    HCT 29.1 11/07/2024 05:55 AM    MCV 97.7 11/07/2024 05:55 AM    MCH 31.5 11/07/2024 05:55 AM    MCHC 32.3 11/07/2024 05:55 AM    RDW 12.8 11/07/2024 05:55 AM     11/07/2024 05:55 AM    MPV 10.3 11/07/2024 05:55 AM       ASSESSMENT AND PLAN:     Status post left total Knee arthroplasty    - Weight bearing as tolerated  - Deep venous thrombosis prophylaxis - Eliquis 2.5 BID, SCD's. Chente hose bilateral, thigh high  - Continue physical therapy  - Pain Control: Wean to oral meds   - Dressing change: Surgical dressings were taken down and exchanged.  - D/C Plan:  Rehab, anticipate discharge home later this afternoon.  He will follow-up next week for dressing change, suture removal, postoperative imaging.      WALI Corbin-CNP  Orthopaedic Surgery   11/7/24  1:23 PM

## 2024-11-07 NOTE — PROGRESS NOTES
Daily Treat       Evaluating PT:  Ben Rivas PT     Room #:  0732/0732-A  Diagnosis:  Hx LT TKA 10/29/24, Diff walking   Pertinent PMHx/PSHx:  See PMH  Procedure/Surgery:  LT TKA 10/29/24  home and back to hospital   Precautions:  Falls, WBAT  Equipment Needs:  WC for indep mobility and WW with assistance x 2     SUBJECTIVE:     Pt lives with Dtr in a 1 story home with 1 mini stairs and 0 rail to enter.    Pt ambulated with SC prior to LT TKA PTA.  Reports fall history prior to LT TKA using SC.     OBJECTIVE:    Initial Evaluation  Date: 11/2/24 Treatment  11/7/24 Short Term/ Long Term   Goals   Was pt agreeable to Eval/treatment? Yes Yes      Does pt have pain? LT knee LT knee      Bed Mobility   NA-in chair on arrival.  Aids report Mod/Max x 2  NT Mod assistance or <   Transfers Sit to stand toilet: Max x 2  Stand to sit: Min/Mod  Stand pivot: Sit to stand: SBA  Stand to sit: Min A Mod x 2   Ambulation   10 feet with WW Min/close CGA x 2 AAT, moderate/high fall risks 90 feet x 2 with WW CGA  25-40 feet with WW Min x 2    Stair negotiation: ascended and descended  NA 6\" platform step with WW Min A Rehab to address   ROM LT knee -10*-90*   LT Knee   MMT Limited efforts/unreliable, 3/5 via AROM   BLE's 1 gr   AM-PAC 6 Clicks 13/24 14/24      Pt is alert, following instruction, states feels weak  Balance: fair minus dynamic with WW    Pt performed therapeutic exercise of the following: seated B ankle pumps, L LE quad sets, LAQ's AROM; L LE heel slide motions AAROM x 20  Patient education/treatment  Pt was educated on UE usage transfer safety, gait mechanics for posture/staying within WW base of support, platform step negotiation, therapeutic exercise for circulation/ROM/strengthening    Patient response to education:   Pt verbalized understanding Pt demonstrated skill Pt requires further education in this area   yes With prompt transfers yes     ASSESSMENT:   Comments: Pt found in a bedside chair, agreed to Rx.

## 2024-11-07 NOTE — CARE COORDINATION
Updated plan of care. Authorization received for [facility]. [facility] transport to  at 320 pm. 7000 and wheelchair transport done. MIKE initiated. Updated facility, staff, pt/family of time-jasono

## 2024-11-07 NOTE — PROGRESS NOTES
Comprehensive Nutrition Assessment    Type and Reason for Visit:  Initial, LOS    Nutrition Recommendations/Plan:   Continue current diet  Will continue to monitor while inpatient     Nutrition Assessment:    Pt w/ difficulty ambulating s/p L TKA 10/29. Hx DM, CAD, CKD, COPD. S/p RRT 11/1. KUB 11/4- no fecal retention noted. Pt received DM education by diabetes educator today. Pt had no questions or concerns r/t DM or diet guidelines. Pt states he has a good appetite and is eating >75% of meals. Continue current diet and monitor.    Nutrition Related Findings:    A&O x4, abd WDL, +BS, +2/3 edema, -11.9 L, BUN/Cr 32/1.4, gluc 192, elevated LFTs, A1c 8.2, steroid, lasix Wound Type: Surgical Incision (denuded inner buttocks d/t IAD & moisture per wound care nurse)       Current Nutrition Intake & Therapies:    Average Meal Intake: % (per pt)  Average Supplements Intake: None Ordered  ADULT DIET; Regular; 3 carb choices (45 gm/meal)    Anthropometric Measures:  Height: 180.3 cm (5' 11\")  Ideal Body Weight (IBW): 172 lbs (78 kg)    Admission Body Weight: 116.1 kg (256 lb) (10/31 stated)  Current Body Weight: 116.1 kg (256 lb) (10/31 stated- UTO CBW d/t pt in chair), 148.8 % IBW. Weight Source: Stated  Current BMI (kg/m2): 35.7  Usual Body Weight: 118.6 kg (261 lb 8 oz) (4/11/24)     % Weight Change (Calculated): -2.1  Weight Adjustment For: No Adjustment     BMI Categories: Obese Class 2 (BMI 35.0 -39.9)    Nutrition Diagnosis:   No nutrition diagnosis at this time    Nutrition Interventions:   Food and/or Nutrient Delivery: Continue Current Diet  Nutrition Education/Counseling: No recommendation at this time (pt received DM education by diabetes educator)  Coordination of Nutrition Care: Continue to monitor while inpatient       Goals:  Goals: PO intake 75% or greater, by next RD assessment  Type of Goal: New goal  Previous Goal Met: New Goal    Nutrition Monitoring and Evaluation:   Behavioral-Environmental

## 2024-11-07 NOTE — PROGRESS NOTES
Reason for consult:  Diabetes education     A1C: 8.2%            Patient states the following concerns/barriers to diabetes self-management:     [] None       [] Medication cost   [] Food cost/availability        [] Reading  [] Hearing   [] Vision                [] Work    [] Transportation  [] No insurance  [x] Physical limitations    [] Other:        Patient states the following about their diabetes/health:   [x] He was diagnosed with diabetes years ago; he does not take any medication for it.     [x] He monitors his fasting blood sugar daily; it ranges between 120-230 mg/dl.    [x] He usually has only a cup of coffee at breakfast. He prepares his own meals and eats lunch and dinner daily. He is unsure of which foods are carbohydrates. He likes cereal, fruit, noodle casseroles, peas, potatoes, and several non-starchy vegetables. He eats celery with peanut butter for a nighttime snack.    [x]  He has not been very active d/t left knee pain. He had this knee replaced on 10/29 and will be going to rehab for physical therapy.        Diabetes survival packet provided to:   [x] Patient     [] Other:    Information given:   Definition of diabetes   Target glucose ranges/A1C   Self-monitoring of blood glucose   Prevention/symptoms/treatment of hypo-/hyperglycemia   Medication adherence             The plate method/meal planning guidelines             The benefits of exercise and recommendations             Reducing the risk of chronic complications     Diabetes medications reviewed (use, purpose, action): Understands he is currently receiving insulin but will most likely be able to transition to oral medication once back home.             Post-education Assessment  [x]  Attentive to teaching  [x]  Answered questions appropriately when asked   [x]  Seems able to apply concepts to daily lifestyle  [x]  Seems motivated to do well  [x]  Verbalized an understanding of plate method  [x]  Verbalized an understanding of

## 2024-11-07 NOTE — PROGRESS NOTES
Date: 11/7/2024    Time: 7:49 AM    Patient Placed On BIPAP/CPAP/ Non-Invasive Ventilation?  No    If no must comment.  Facial area red/color change? Yes           If YES are Blister/Lesion present?Yes   If yes must notify nursing staff  BIPAP/CPAP skin barrier?  No    Skin barrier type: patient not using noninvasive        Comments: RN notified, skin barrier was present and available to use        Breann aCtes RCP

## 2024-11-07 NOTE — PLAN OF CARE
Problem: ABCDS Injury Assessment  Goal: Absence of physical injury  11/7/2024 1237 by Belkis Campoverde RN  Outcome: Progressing  11/7/2024 0403 by Maura Saldaña RN  Outcome: Progressing     Problem: Skin/Tissue Integrity  Goal: Absence of new skin breakdown  Description: 1.  Monitor for areas of redness and/or skin breakdown  2.  Assess vascular access sites hourly  3.  Every 4-6 hours minimum:  Change oxygen saturation probe site  4.  Every 4-6 hours:  If on nasal continuous positive airway pressure, respiratory therapy assess nares and determine need for appliance change or resting period.  11/7/2024 1237 by Belkis Campoverde RN  Outcome: Progressing  11/7/2024 0403 by Maura Saldaña RN  Outcome: Progressing     Problem: Discharge Planning  Goal: Discharge to home or other facility with appropriate resources  11/7/2024 1237 by Belkis Campoverde RN  Outcome: Progressing  11/7/2024 0403 by Maura Saldaña RN  Outcome: Progressing     Problem: Chronic Conditions and Co-morbidities  Goal: Patient's chronic conditions and co-morbidity symptoms are monitored and maintained or improved  11/7/2024 1237 by Belkis Campoverde RN  Outcome: Progressing  11/7/2024 0403 by Maura Saldaña RN  Outcome: Progressing     Problem: Safety - Adult  Goal: Free from fall injury  11/7/2024 1237 by Belkis Campoverde RN  Outcome: Progressing  11/7/2024 0403 by Maura Saldaña RN  Outcome: Progressing

## 2024-11-07 NOTE — PROGRESS NOTES
Nephrology Progress Note  Patient's Name: Mason Gudino  9:35 AM  11/7/2024    Nephrologist: YADIRA Johnston MD    Reason for Consult:  ROSEANN on CKD G3A  Requesting Physician:  Abhinav Elkins MD    Chief Complaint:  Falls    History Obtained From:  patient, relative(s), and past medical records    History of Present Ilness from the 11/3/24 note:    Mason Gudino is a 79 y.o. male with prior history of CKD G3A with a baseline serum cr of 1.3-1.5mg/dl with an e-GFR=46-54ml/min presumed sec to microvascular disease due to HTN and DM2. On 10/29/24 Mason had a L TKA. During the admission he did have an elevated PVR of 902ml. He was seen by Urology but no lyons was felt necessary at D/C on 10/30/24. Once at home he had 2 falls in a 24hr period and came back to the ED 10/31/24. On 11/1/24 he was an RRT for tachypnea with assoc chills and T 101.6. He had a CTA 11/1/24 which was (-) for PE. He had a repeat PVR and was 204ml. ID was consulted and he was started on cefepime and Vanc. At the time of my visit 11/3/24, he was up in chair and stated he felt OK and the knee was better than it had been pre-op. Pt states he did not know he had DM2 but his family stated he had a HgB A1c 8.2%  He notes he has a very long Hx HTN. His family also notes prior to having the L TKA he had to have a PTCA to LAD.  He denies any use NSAID  He was a prior smoker but quit apprx 40yrs ago.  He was a drinker until he had the Cardiac cath in Feb 2024    INTERVAL HX:  11/7/2024:    Pt awake alert and resting  in bed. Bradicardia over night with incomplete left bundle branch block.  He states he feels better but breathing is still not at baseline. SOB at rest is improved    Past Medical History:   Diagnosis Date    Aortic stenosis     Asbestosis(501) 06/2008    CAD (coronary artery disease)     LAD stent    Degenerative joint disease     GERD (gastroesophageal reflux disease)     History of coronary artery stent placement     Hyperlipidemia      and 2.3mg/dl on 11/3/24 down to 1.8mg/dl 11/4 -> 1.5->1.4 mg/dL 11/7/24. Now at baseline   PLAN:  Follow Labs  Follow I/O's   Avoid Further Nephrotoxins  Discharge planning  Renal function stable. Ok from a purely renal perspective for discharge at this time.  Follow up with nephrology as OP        CKD G3A   with a baseline serum cr of 1.3-1.5mg/dl with an e-GFR=46-54ml/min presumed sec to microvascular disease due to HTN and DM2  PLAN:  Monitor    Hyponatremia in the setting of the ROSEANN  Na+ 129-->131-->132-->133 11/5  Sodium 135 mmol/L 11/7/2024  PLAN:  Follow Na+ daily while IP     Mineral bone disease  Sec HPTH of Renal Origin   Hypocalcemia with Hypoalbuminemia   Unspecified Vit D Def    Last Ca 8.4 mg/dL with an albumin of 2.7   Last phosphorus 3.2  mg/dL  Vitamin D 25 11.4 and PTH 58.1  Plan  Monitor phos and Ca+  Give vitamin D supplement 50,000 units daily   No changes        Anemia in CKD   Pre-Op HgB 12.8 WNL-> Hg today 9.4 g/dL  B12 746, Ferritin 1,146, Iron 27 and Iron saturation of 20% on 11/5/24  Folate 3.3(low)  Plan    Continue  Folic acid  supplement  Follow CBC  Transfuse for HgB <7.0    HTN with CKD I-IV  BP goal <130/80(ACC/AHA Target)-at goal  PLAN:  Follow on the metoprolol    Thank you for allowing us to participate in care of Mason Camargo MD  9:35 AM  11/7/2024

## 2024-11-07 NOTE — PROGRESS NOTES
[facility]  Progress Note    Chief complaint :  Chief Complaint   Patient presents with    Gait Problem     Left knee replacement on tuesday; having trouble getting around at home; denies any injuries; wants to have rehab placement        Subjective:    Overnight patient did have a heart rate in the 40s on telemetry, he was asymptomatic and sleeping at this time however nursing did notify overnight MD.  EKG was ordered which appeared stable to prior.  Patient was awoken and felt fine.  His heart rate returned to the 50s which is his baseline.  Patient describes feeling unchanged. Patient denies chest pain, SOB, nausea, vomiting, bloody stools, fever, chills, changes in urination, changes in BM. Patient is tolerating diet.    Patient was seen this morning awake in bed watching his tablet.  States his knee feels a little rough this morning as he does have pain \"on the top of it\" which on palpation appears to be on the patella bone itself.  Let patient know that this is to be expected following a knee surgery, orthopedic surgeons have evaluated and are not concerned at this time.  I did let patient know that he has as needed pain medications for that pain.  It appears stable on examination.  He states he is breathing better, still is wheezing a bit but he notices a difference with the breathing treatments.    Past medical, surgical, family and social history were reviewed, non-contributory, and unchanged unless otherwise stated.    Review of Systems   Constitutional:  Negative for chills and fever.   Respiratory:  Positive for cough (chronic) and wheezing. Negative for chest tightness and shortness of breath.    Cardiovascular:  Positive for leg swelling. Negative for chest pain and palpitations.   Gastrointestinal:  Negative for abdominal pain, blood in stool, constipation, diarrhea, nausea and vomiting.   Genitourinary:  Negative for decreased urine volume, difficulty urinating,

## 2024-11-07 NOTE — PROGRESS NOTES
Paged Dr. Little, cover for family practice, regarding patient's recent bradycardia maintaining a HR 50-low 40's. Given order for EKG, resulted as sinus bradycardia, low voltage QRS, and incomplete L bundle branch block.     Advised to notify if HR lowers or patient becomes symptomatic.

## 2024-11-08 ENCOUNTER — OUTSIDE SERVICES (OUTPATIENT)
Dept: PRIMARY CARE CLINIC | Age: 79
End: 2024-11-08

## 2024-11-08 DIAGNOSIS — Z96.652 STATUS POST LEFT KNEE REPLACEMENT: ICD-10-CM

## 2024-11-08 DIAGNOSIS — I10 PRIMARY HYPERTENSION: ICD-10-CM

## 2024-11-08 DIAGNOSIS — R50.9 FEVER, UNSPECIFIED FEVER CAUSE: ICD-10-CM

## 2024-11-08 DIAGNOSIS — E11.65 UNCONTROLLED TYPE 2 DIABETES MELLITUS WITH HYPERGLYCEMIA (HCC): ICD-10-CM

## 2024-11-08 DIAGNOSIS — R53.81 PHYSICAL DECONDITIONING: ICD-10-CM

## 2024-11-08 DIAGNOSIS — I48.19 PERSISTENT ATRIAL FIBRILLATION (HCC): ICD-10-CM

## 2024-11-08 DIAGNOSIS — J44.9 CHRONIC OBSTRUCTIVE PULMONARY DISEASE, UNSPECIFIED COPD TYPE (HCC): ICD-10-CM

## 2024-11-08 DIAGNOSIS — R53.1 GENERALIZED WEAKNESS: ICD-10-CM

## 2024-11-08 DIAGNOSIS — R26.2 DIFFICULTY IN WALKING: Primary | ICD-10-CM

## 2024-11-08 DIAGNOSIS — R26.2 INABILITY TO AMBULATE DUE TO LEFT KNEE: ICD-10-CM

## 2024-11-08 DIAGNOSIS — N18.9 ACUTE KIDNEY INJURY SUPERIMPOSED ON CKD (HCC): ICD-10-CM

## 2024-11-08 DIAGNOSIS — N17.9 ACUTE KIDNEY INJURY SUPERIMPOSED ON CKD (HCC): ICD-10-CM

## 2024-11-08 DIAGNOSIS — Z91.81 AT MAXIMUM RISK FOR FALL: ICD-10-CM

## 2024-11-08 DIAGNOSIS — E87.1 HYPONATREMIA: ICD-10-CM

## 2024-11-08 DIAGNOSIS — R60.0 BILATERAL LOWER EXTREMITY EDEMA: ICD-10-CM

## 2024-11-08 NOTE — TELEPHONE ENCOUNTER
Discharged to SNF    For Pharmacy Admin Tracking Only    Program: [facility]  CPA in place:  No  Gap Closed?: No   Time Spent (min): 15
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
## 2024-11-11 ENCOUNTER — OUTSIDE SERVICES (OUTPATIENT)
Dept: PRIMARY CARE CLINIC | Age: 79
End: 2024-11-11
Payer: MEDICARE

## 2024-11-11 DIAGNOSIS — R80.9 TYPE 2 DIABETES MELLITUS WITH MICROALBUMINURIA, WITHOUT LONG-TERM CURRENT USE OF INSULIN (HCC): ICD-10-CM

## 2024-11-11 DIAGNOSIS — R26.2 DIFFICULTY IN WALKING: ICD-10-CM

## 2024-11-11 DIAGNOSIS — N17.9 ACUTE KIDNEY INJURY SUPERIMPOSED ON CKD (HCC): ICD-10-CM

## 2024-11-11 DIAGNOSIS — E78.5 HYPERLIPIDEMIA, UNSPECIFIED HYPERLIPIDEMIA TYPE: ICD-10-CM

## 2024-11-11 DIAGNOSIS — N18.31 STAGE 3A CHRONIC KIDNEY DISEASE (HCC): Primary | ICD-10-CM

## 2024-11-11 DIAGNOSIS — R60.0 BILATERAL LOWER EXTREMITY EDEMA: ICD-10-CM

## 2024-11-11 DIAGNOSIS — J44.9 CHRONIC OBSTRUCTIVE PULMONARY DISEASE, UNSPECIFIED COPD TYPE (HCC): ICD-10-CM

## 2024-11-11 DIAGNOSIS — I10 PRIMARY HYPERTENSION: ICD-10-CM

## 2024-11-11 DIAGNOSIS — I48.19 PERSISTENT ATRIAL FIBRILLATION (HCC): ICD-10-CM

## 2024-11-11 DIAGNOSIS — I50.32 CHRONIC HEART FAILURE WITH PRESERVED EJECTION FRACTION (HCC): ICD-10-CM

## 2024-11-11 DIAGNOSIS — E11.29 TYPE 2 DIABETES MELLITUS WITH MICROALBUMINURIA, WITHOUT LONG-TERM CURRENT USE OF INSULIN (HCC): ICD-10-CM

## 2024-11-11 DIAGNOSIS — R53.1 WEAKNESS GENERALIZED: ICD-10-CM

## 2024-11-11 DIAGNOSIS — Z96.652 STATUS POST LEFT KNEE REPLACEMENT: ICD-10-CM

## 2024-11-11 DIAGNOSIS — N18.9 ACUTE KIDNEY INJURY SUPERIMPOSED ON CKD (HCC): ICD-10-CM

## 2024-11-11 PROCEDURE — 99309 SBSQ NF CARE MODERATE MDM 30: CPT | Performed by: NURSE PRACTITIONER

## 2024-11-13 ENCOUNTER — OUTSIDE SERVICES (OUTPATIENT)
Dept: PRIMARY CARE CLINIC | Age: 79
End: 2024-11-13
Payer: MEDICARE

## 2024-11-13 DIAGNOSIS — R26.2 DIFFICULTY IN WALKING: ICD-10-CM

## 2024-11-13 DIAGNOSIS — R60.0 BILATERAL LOWER EXTREMITY EDEMA: ICD-10-CM

## 2024-11-13 DIAGNOSIS — J44.9 CHRONIC OBSTRUCTIVE PULMONARY DISEASE, UNSPECIFIED COPD TYPE (HCC): ICD-10-CM

## 2024-11-13 DIAGNOSIS — R80.9 TYPE 2 DIABETES MELLITUS WITH MICROALBUMINURIA, WITHOUT LONG-TERM CURRENT USE OF INSULIN (HCC): ICD-10-CM

## 2024-11-13 DIAGNOSIS — E11.29 TYPE 2 DIABETES MELLITUS WITH MICROALBUMINURIA, WITHOUT LONG-TERM CURRENT USE OF INSULIN (HCC): ICD-10-CM

## 2024-11-13 DIAGNOSIS — I10 PRIMARY HYPERTENSION: ICD-10-CM

## 2024-11-13 DIAGNOSIS — I48.19 PERSISTENT ATRIAL FIBRILLATION (HCC): ICD-10-CM

## 2024-11-13 DIAGNOSIS — Z96.652 STATUS POST LEFT KNEE REPLACEMENT: ICD-10-CM

## 2024-11-13 DIAGNOSIS — N18.31 STAGE 3A CHRONIC KIDNEY DISEASE (HCC): Primary | ICD-10-CM

## 2024-11-13 DIAGNOSIS — N17.9 ACUTE KIDNEY INJURY SUPERIMPOSED ON CKD (HCC): ICD-10-CM

## 2024-11-13 DIAGNOSIS — I50.32 CHRONIC HEART FAILURE WITH PRESERVED EJECTION FRACTION (HCC): ICD-10-CM

## 2024-11-13 DIAGNOSIS — R53.1 WEAKNESS GENERALIZED: ICD-10-CM

## 2024-11-13 DIAGNOSIS — E78.5 HYPERLIPIDEMIA, UNSPECIFIED HYPERLIPIDEMIA TYPE: ICD-10-CM

## 2024-11-13 DIAGNOSIS — N18.9 ACUTE KIDNEY INJURY SUPERIMPOSED ON CKD (HCC): ICD-10-CM

## 2024-11-13 PROCEDURE — 99309 SBSQ NF CARE MODERATE MDM 30: CPT | Performed by: NURSE PRACTITIONER

## 2024-11-13 NOTE — PROGRESS NOTES
Physician Progress Note      PATIENT:               CORNELIUS INIGUEZ  [facility] #:                  [id number]  :                       1945  ADMIT DATE:       10/31/2024 4:14 PM  DISCH DATE:        2024 4:29 PM  RESPONDING  PROVIDER #:        JOHANNA SMITH          QUERY TEXT:    Patient admitted with Inability to ambulate secondary to knee replacement .   Documentation reflects concern for Sepsis in PN . If possible, please   document in the progress notes and discharge summary if Sepsis was:    The medical record reflects the following:  Risk Factors: Total left knee replacement on 10/29/2024.  Clinical Indicators:   T 101.6 WBC 15.0 Per Nephro - ROSEANN on CKD G3A. Per PN   - -  SIRS with possible sepsis. Inability to ambulate - pt likely was   getting sick and so getting weak. 11/3-No septic joint per ortho. We will   repeat imaging of the chest with cxr to again investigate for pneumonia but my   suspicion is low. IM -   Fever, etiology unclear. Doubt surgical site   infection or septic joint left knee. Per d/c summary-  Infectious disease   stopped antibiotics on day 4 of hospitalization as it was likely that patient   suffered from systemic inflammatory response syn  Treatment:  cefepime and Vanco  Options provided:  -- Sepsis confirmed after study  -- Sepsis ruled out after study  -- Other - I will add my own diagnosis  -- Disagree - Not applicable / Not valid  -- Disagree - Clinically unable to determine / Unknown  -- Refer to Clinical Documentation Reviewer    PROVIDER RESPONSE TEXT:    Patient likely with post op Systemic Inflammatory Response Syndrome    Query created by: Rajendra Gee on 2024 10:51 AM      QUERY TEXT:    Pt admitted with Inability to ambulate and underwent Total left knee   replacement on 10/29/2024.? If possible, please document in progress notes and   discharge summary the relationship if any between the pain and the surgery:  ?  The medical record reflects the

## 2024-11-14 ENCOUNTER — TELEPHONE (OUTPATIENT)
Dept: FAMILY MEDICINE CLINIC | Age: 79
End: 2024-11-14

## 2024-11-14 NOTE — TELEPHONE ENCOUNTER
Daughter Gianna calling concerned about elevated blood sugar since knee surgery at the end of October. Patient is currently in acute rehab facility and they have added insulin. Unsure of what his FBS are running. Hemoglobin A1c was 8.2 on 11/3. She is worried about getting medications and wants to make sure they have the insulin if needed. I explained that they should be provided with prescriptions of any new medications that were added during his stay upon discharge. I asked that she let us know if they don't receive what they need and we should be able to bridge the prescriptions until his appointment on 12/2/2024. They are unsure when he will be released, but anticipate it will be before Thanksgiving.

## 2024-11-15 ENCOUNTER — OUTSIDE SERVICES (OUTPATIENT)
Dept: PRIMARY CARE CLINIC | Age: 79
End: 2024-11-15
Payer: MEDICARE

## 2024-11-15 DIAGNOSIS — Z96.652 STATUS POST LEFT KNEE REPLACEMENT: ICD-10-CM

## 2024-11-15 DIAGNOSIS — E78.5 HYPERLIPIDEMIA, UNSPECIFIED HYPERLIPIDEMIA TYPE: ICD-10-CM

## 2024-11-15 DIAGNOSIS — I10 PRIMARY HYPERTENSION: ICD-10-CM

## 2024-11-15 DIAGNOSIS — R60.0 BILATERAL LOWER EXTREMITY EDEMA: ICD-10-CM

## 2024-11-15 DIAGNOSIS — R53.1 WEAKNESS GENERALIZED: ICD-10-CM

## 2024-11-15 DIAGNOSIS — N18.31 STAGE 3A CHRONIC KIDNEY DISEASE (HCC): Primary | ICD-10-CM

## 2024-11-15 DIAGNOSIS — R80.9 TYPE 2 DIABETES MELLITUS WITH MICROALBUMINURIA, WITHOUT LONG-TERM CURRENT USE OF INSULIN (HCC): ICD-10-CM

## 2024-11-15 DIAGNOSIS — I48.19 PERSISTENT ATRIAL FIBRILLATION (HCC): ICD-10-CM

## 2024-11-15 DIAGNOSIS — J44.9 CHRONIC OBSTRUCTIVE PULMONARY DISEASE, UNSPECIFIED COPD TYPE (HCC): ICD-10-CM

## 2024-11-15 DIAGNOSIS — F10.20 ALCOHOLISM (HCC): ICD-10-CM

## 2024-11-15 DIAGNOSIS — N17.9 ACUTE KIDNEY INJURY SUPERIMPOSED ON CKD (HCC): ICD-10-CM

## 2024-11-15 DIAGNOSIS — I50.32 CHRONIC HEART FAILURE WITH PRESERVED EJECTION FRACTION (HCC): ICD-10-CM

## 2024-11-15 DIAGNOSIS — N18.9 ACUTE KIDNEY INJURY SUPERIMPOSED ON CKD (HCC): ICD-10-CM

## 2024-11-15 DIAGNOSIS — R26.2 DIFFICULTY IN WALKING: ICD-10-CM

## 2024-11-15 DIAGNOSIS — E11.29 TYPE 2 DIABETES MELLITUS WITH MICROALBUMINURIA, WITHOUT LONG-TERM CURRENT USE OF INSULIN (HCC): ICD-10-CM

## 2024-11-15 PROCEDURE — 99309 SBSQ NF CARE MODERATE MDM 30: CPT | Performed by: NURSE PRACTITIONER

## 2024-11-18 ENCOUNTER — OUTSIDE SERVICES (OUTPATIENT)
Dept: PRIMARY CARE CLINIC | Age: 79
End: 2024-11-18
Payer: MEDICARE

## 2024-11-18 DIAGNOSIS — J44.9 CHRONIC OBSTRUCTIVE PULMONARY DISEASE, UNSPECIFIED COPD TYPE (HCC): ICD-10-CM

## 2024-11-18 DIAGNOSIS — R26.2 DIFFICULTY IN WALKING: ICD-10-CM

## 2024-11-18 DIAGNOSIS — Z96.652 STATUS POST LEFT KNEE REPLACEMENT: ICD-10-CM

## 2024-11-18 DIAGNOSIS — N17.9 ACUTE KIDNEY INJURY SUPERIMPOSED ON CKD (HCC): ICD-10-CM

## 2024-11-18 DIAGNOSIS — R53.1 WEAKNESS GENERALIZED: ICD-10-CM

## 2024-11-18 DIAGNOSIS — I48.19 PERSISTENT ATRIAL FIBRILLATION (HCC): ICD-10-CM

## 2024-11-18 DIAGNOSIS — R80.9 TYPE 2 DIABETES MELLITUS WITH MICROALBUMINURIA, WITHOUT LONG-TERM CURRENT USE OF INSULIN (HCC): ICD-10-CM

## 2024-11-18 DIAGNOSIS — N18.31 STAGE 3A CHRONIC KIDNEY DISEASE (HCC): Primary | ICD-10-CM

## 2024-11-18 DIAGNOSIS — I50.32 CHRONIC HEART FAILURE WITH PRESERVED EJECTION FRACTION (HCC): ICD-10-CM

## 2024-11-18 DIAGNOSIS — R60.0 BILATERAL LOWER EXTREMITY EDEMA: ICD-10-CM

## 2024-11-18 DIAGNOSIS — I10 PRIMARY HYPERTENSION: ICD-10-CM

## 2024-11-18 DIAGNOSIS — E78.5 HYPERLIPIDEMIA, UNSPECIFIED HYPERLIPIDEMIA TYPE: ICD-10-CM

## 2024-11-18 DIAGNOSIS — E11.29 TYPE 2 DIABETES MELLITUS WITH MICROALBUMINURIA, WITHOUT LONG-TERM CURRENT USE OF INSULIN (HCC): ICD-10-CM

## 2024-11-18 DIAGNOSIS — N18.9 ACUTE KIDNEY INJURY SUPERIMPOSED ON CKD (HCC): ICD-10-CM

## 2024-11-18 PROCEDURE — 99309 SBSQ NF CARE MODERATE MDM 30: CPT | Performed by: NURSE PRACTITIONER

## 2024-11-19 ENCOUNTER — OFFICE VISIT (OUTPATIENT)
Dept: ORTHOPEDIC SURGERY | Age: 79
End: 2024-11-19

## 2024-11-19 VITALS
DIASTOLIC BLOOD PRESSURE: 70 MMHG | HEIGHT: 71 IN | RESPIRATION RATE: 20 BRPM | SYSTOLIC BLOOD PRESSURE: 142 MMHG | BODY MASS INDEX: 35.84 KG/M2 | TEMPERATURE: 99 F | WEIGHT: 256 LBS | HEART RATE: 58 BPM | OXYGEN SATURATION: 99 %

## 2024-11-19 DIAGNOSIS — M17.12 PRIMARY OSTEOARTHRITIS OF LEFT KNEE: ICD-10-CM

## 2024-11-19 DIAGNOSIS — Z96.652 S/P TOTAL KNEE REPLACEMENT, LEFT: Primary | ICD-10-CM

## 2024-11-19 PROCEDURE — 99024 POSTOP FOLLOW-UP VISIT: CPT | Performed by: NURSE PRACTITIONER

## 2024-11-19 NOTE — PROGRESS NOTES
[facility]  ORTHOPAEDICS AND SPORTS MEDICINE  DATE OF VISIT: 11/19/24  Follow Up Post Operative Visit     Follow Up Post Operative Visit     CHIEF COMPLAINT:   Chief Complaint   Patient presents with    Post-Op Check     Pt is here 3 weeks out from a adithya robot assisted left total knee arthroplasty. Overall doing well.        Surgery: Adithya Robot Assisted Left total knee arthroplasty   Date: 10/29/2024    Subjective:    Mason Gudino is here for follow up visit s/p above procedure.  He is doing well.  He has been progressing well with therapy at his rehab facility.  He is set to be discharged home on Thursday.  He denies pain at the time of visit today.    Controlled Substances Monitoring:        Physical Exam:    Height: 1.803 m (5' 11\"), Weight - Scale: 116.1 kg (256 lb), BP: (!) 142/70    General: Alert and oriented x3, no acute distress  Cardiovascular/pulmonary: No labored breathing, peripheral perfusion intact  Musculoskeletal:    Left knee exam incision is closed edges well-approximated no active drainage present.  Stable postoperative swelling.  Neurovascular sensation grossly intact.  Range of motion 0-120.      Imaging: X-ray including 4 views left knee display stable.  Total knee replacement without complication    Assessment and Plan: Status post robotic assisted total knee arthroplasty    Patient is about 3 weeks out from procedure listed above doing well.  He displays excellent motion on exam.  Postop imaging obtained and reviewed with patient today.  He will be discharged home from rehab facility on Thursday.  Will continue with home health physical therapy.  He will follow-up in 6 weeks for reevaluation.      WALI Corbin-CNP  Orthopedic Surgery   11/19/24  3:18 PM

## 2024-11-20 ENCOUNTER — OUTSIDE SERVICES (OUTPATIENT)
Dept: PRIMARY CARE CLINIC | Age: 79
End: 2024-11-20
Payer: MEDICARE

## 2024-11-20 DIAGNOSIS — N18.9 ACUTE KIDNEY INJURY SUPERIMPOSED ON CKD (HCC): ICD-10-CM

## 2024-11-20 DIAGNOSIS — R53.1 WEAKNESS GENERALIZED: ICD-10-CM

## 2024-11-20 DIAGNOSIS — I10 PRIMARY HYPERTENSION: ICD-10-CM

## 2024-11-20 DIAGNOSIS — N17.9 ACUTE KIDNEY INJURY SUPERIMPOSED ON CKD (HCC): ICD-10-CM

## 2024-11-20 DIAGNOSIS — R80.9 TYPE 2 DIABETES MELLITUS WITH MICROALBUMINURIA, WITHOUT LONG-TERM CURRENT USE OF INSULIN (HCC): ICD-10-CM

## 2024-11-20 DIAGNOSIS — R60.0 BILATERAL LOWER EXTREMITY EDEMA: ICD-10-CM

## 2024-11-20 DIAGNOSIS — E78.5 HYPERLIPIDEMIA, UNSPECIFIED HYPERLIPIDEMIA TYPE: ICD-10-CM

## 2024-11-20 DIAGNOSIS — N18.31 STAGE 3A CHRONIC KIDNEY DISEASE (HCC): Primary | ICD-10-CM

## 2024-11-20 DIAGNOSIS — I50.32 CHRONIC HEART FAILURE WITH PRESERVED EJECTION FRACTION (HCC): ICD-10-CM

## 2024-11-20 DIAGNOSIS — Z96.652 STATUS POST LEFT KNEE REPLACEMENT: ICD-10-CM

## 2024-11-20 DIAGNOSIS — J44.9 CHRONIC OBSTRUCTIVE PULMONARY DISEASE, UNSPECIFIED COPD TYPE (HCC): ICD-10-CM

## 2024-11-20 DIAGNOSIS — E11.29 TYPE 2 DIABETES MELLITUS WITH MICROALBUMINURIA, WITHOUT LONG-TERM CURRENT USE OF INSULIN (HCC): ICD-10-CM

## 2024-11-20 DIAGNOSIS — I50.32 CHRONIC DIASTOLIC HEART FAILURE (HCC): ICD-10-CM

## 2024-11-20 DIAGNOSIS — I48.19 PERSISTENT ATRIAL FIBRILLATION (HCC): ICD-10-CM

## 2024-11-20 DIAGNOSIS — R26.2 DIFFICULTY IN WALKING: ICD-10-CM

## 2024-11-20 PROCEDURE — 99309 SBSQ NF CARE MODERATE MDM 30: CPT | Performed by: NURSE PRACTITIONER

## 2024-11-21 ENCOUNTER — OFFICE VISIT (OUTPATIENT)
Dept: CARDIOLOGY CLINIC | Age: 79
End: 2024-11-21
Payer: MEDICARE

## 2024-11-21 VITALS
HEART RATE: 59 BPM | DIASTOLIC BLOOD PRESSURE: 60 MMHG | HEIGHT: 71 IN | RESPIRATION RATE: 18 BRPM | WEIGHT: 251.8 LBS | BODY MASS INDEX: 35.25 KG/M2 | SYSTOLIC BLOOD PRESSURE: 128 MMHG

## 2024-11-21 DIAGNOSIS — N18.9 CHRONIC KIDNEY DISEASE, UNSPECIFIED CKD STAGE: ICD-10-CM

## 2024-11-21 DIAGNOSIS — R00.1 BRADYCARDIA: ICD-10-CM

## 2024-11-21 DIAGNOSIS — Z95.2 S/P TAVR (TRANSCATHETER AORTIC VALVE REPLACEMENT): ICD-10-CM

## 2024-11-21 DIAGNOSIS — I50.32 CHRONIC HEART FAILURE WITH PRESERVED EJECTION FRACTION (HCC): Primary | ICD-10-CM

## 2024-11-21 DIAGNOSIS — Z79.899 LONG TERM CURRENT USE OF AMIODARONE: ICD-10-CM

## 2024-11-21 DIAGNOSIS — Z79.01 ON APIXABAN THERAPY: ICD-10-CM

## 2024-11-21 DIAGNOSIS — I25.10 CORONARY ARTERY DISEASE INVOLVING NATIVE CORONARY ARTERY OF NATIVE HEART WITHOUT ANGINA PECTORIS: ICD-10-CM

## 2024-11-21 PROCEDURE — G8427 DOCREV CUR MEDS BY ELIG CLIN: HCPCS | Performed by: INTERNAL MEDICINE

## 2024-11-21 PROCEDURE — 1123F ACP DISCUSS/DSCN MKR DOCD: CPT | Performed by: INTERNAL MEDICINE

## 2024-11-21 PROCEDURE — 1111F DSCHRG MED/CURRENT MED MERGE: CPT | Performed by: INTERNAL MEDICINE

## 2024-11-21 PROCEDURE — G8417 CALC BMI ABV UP PARAM F/U: HCPCS | Performed by: INTERNAL MEDICINE

## 2024-11-21 PROCEDURE — 93000 ELECTROCARDIOGRAM COMPLETE: CPT | Performed by: INTERNAL MEDICINE

## 2024-11-21 PROCEDURE — 3074F SYST BP LT 130 MM HG: CPT | Performed by: INTERNAL MEDICINE

## 2024-11-21 PROCEDURE — 99214 OFFICE O/P EST MOD 30 MIN: CPT | Performed by: INTERNAL MEDICINE

## 2024-11-21 PROCEDURE — G8484 FLU IMMUNIZE NO ADMIN: HCPCS | Performed by: INTERNAL MEDICINE

## 2024-11-21 PROCEDURE — 1159F MED LIST DOCD IN RCRD: CPT | Performed by: INTERNAL MEDICINE

## 2024-11-21 PROCEDURE — 3078F DIAST BP <80 MM HG: CPT | Performed by: INTERNAL MEDICINE

## 2024-11-21 PROCEDURE — 1036F TOBACCO NON-USER: CPT | Performed by: INTERNAL MEDICINE

## 2024-11-21 RX ORDER — FUROSEMIDE 40 MG/1
40 TABLET ORAL DAILY
Qty: 90 TABLET | Refills: 3 | OUTPATIENT
Start: 2024-11-21

## 2024-11-21 NOTE — PROGRESS NOTES
surgery s/p TKA 10/2024    Recommendations:    Metoprolol succinate 25 mg daily  Continue amiodarone 200 mg daily with monitoring of TFTs, LFTs every 6 months, with annual chest x-ray and eye exam (LFTs 11/2024 ALT 63 AST 43; TSH 2/2024 1.15)  TSH ordered  Continue furosemide 40 mg daily  Continue apixaban for stroke risk reduction; if creatinine consistently less than 1.5 would require the 5 mg twice daily dosing  Aggressive risk factor modification including weight loss recommended  Cardiac rehab once completes physical therapy  Continue compliance with CPAP  Follow-up in 6 months or sooner if need arises    Discussed at length with patient and son    The patient's current medication list, allergies, problem list and results of all previously ordered testing were reviewed at today's visit.    Pa Amin MD, [facility]

## 2024-11-21 NOTE — TELEPHONE ENCOUNTER
Family called back stating that rehab is giving them 2 weeks worth of all medications so they should be good until his next appt.

## 2024-11-25 ASSESSMENT — ENCOUNTER SYMPTOMS
EYE REDNESS: 0
NAUSEA: 0
ABDOMINAL PAIN: 0
CONSTIPATION: 0
ABDOMINAL PAIN: 0
EYE REDNESS: 0
DIARRHEA: 0
WHEEZING: 0
VOMITING: 0
CONSTIPATION: 0
SHORTNESS OF BREATH: 0
EYE PAIN: 0
VOMITING: 0
EYE PAIN: 0
CONSTIPATION: 0
SINUS PRESSURE: 0
CHEST TIGHTNESS: 0
SHORTNESS OF BREATH: 0
NAUSEA: 0
ABDOMINAL PAIN: 0
ABDOMINAL PAIN: 0
CHEST TIGHTNESS: 0
CHEST TIGHTNESS: 0
SORE THROAT: 0
EYE PAIN: 0
COUGH: 0
EYE DISCHARGE: 0
PHOTOPHOBIA: 0
SORE THROAT: 0
SHORTNESS OF BREATH: 0
NAUSEA: 0
SINUS PRESSURE: 0
EYE PAIN: 0
PHOTOPHOBIA: 0
NAUSEA: 0
DIARRHEA: 0
COUGH: 0
WHEEZING: 0
SINUS PRESSURE: 0
DIARRHEA: 0
ABDOMINAL DISTENTION: 0
VOMITING: 0
CONSTIPATION: 0
SINUS PRESSURE: 0
WHEEZING: 0
EYE REDNESS: 0
EYE ITCHING: 0
EYE REDNESS: 0
COUGH: 0
WHEEZING: 0
RHINORRHEA: 0
EYE DISCHARGE: 0
CHEST TIGHTNESS: 0
NAUSEA: 0
RHINORRHEA: 0
EYE DISCHARGE: 0
SHORTNESS OF BREATH: 0
RHINORRHEA: 0
EYE PAIN: 0
WHEEZING: 0
EYE ITCHING: 0
SORE THROAT: 0
DIARRHEA: 0
VOMITING: 0
EYE REDNESS: 0
CONSTIPATION: 0
SHORTNESS OF BREATH: 0
SINUS PRESSURE: 0
DIARRHEA: 0
VOMITING: 0
PHOTOPHOBIA: 0
EYE ITCHING: 0
EYE ITCHING: 0
SORE THROAT: 0
EYE DISCHARGE: 0
PHOTOPHOBIA: 0
ABDOMINAL PAIN: 0
RHINORRHEA: 0
ABDOMINAL DISTENTION: 0
COUGH: 0
CHEST TIGHTNESS: 0
EYE DISCHARGE: 0
ABDOMINAL DISTENTION: 0
SORE THROAT: 0
EYE ITCHING: 0
ABDOMINAL DISTENTION: 0
ABDOMINAL DISTENTION: 0
PHOTOPHOBIA: 0
COUGH: 0
RHINORRHEA: 0

## 2024-11-26 NOTE — PROGRESS NOTES
microalbuminuria, with long-term current use of insulin (HCC)  3. Chronic obstructive pulmonary disease, unspecified COPD type (HCC)  4. Persistent atrial fibrillation (HCC)  5. Chronic heart failure with preserved ejection fraction (HCC)  6. Status post left knee replacement  7. Primary hypertension  8. Hyperlipidemia, unspecified hyperlipidemia type  9. Acute kidney injury superimposed on CKD (HCC)  10. Bilateral lower extremity edema  11. Weakness generalized  12. Difficulty in walking           Seen today for weekly skilled assessment  Chart reviewed: Continue with orders per chart in point click care  Labs: Collect weekly CBC and CMP x3 weeks from admission then monthly.  Continue with therapies as tolerated  Continue with weekly skilled assessment  Continue with discharge planning  Acute medical concerns provider on-call     Review all medications and diagnosis    Review and continue amiodarone 200 mg once a day for A-fib    Review and continue atorvastatin 40 mg once a day for hyperlipidemia    Follow-up with Ortho  Follow-up with nephrology  Follow-up with pulmonology           Over 30 min was spent between patient care, chart review, discussing patient management with nursing staff and interpreting diagnostics      An electronic signature was used to authenticate this note.    --WALI Mello - CNP   This office note has been dictated.
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
## 2024-11-28 PROBLEM — Z01.818 PRE-OP TESTING: Status: RESOLVED | Noted: 2024-10-29 | Resolved: 2024-11-28

## 2024-12-02 ENCOUNTER — HOSPITAL ENCOUNTER (OUTPATIENT)
Age: 79
Discharge: HOME | End: 2024-12-02
Payer: MEDICARE

## 2024-12-02 ENCOUNTER — OFFICE VISIT (OUTPATIENT)
Dept: FAMILY MEDICINE CLINIC | Age: 79
End: 2024-12-02
Payer: MEDICARE

## 2024-12-02 ENCOUNTER — PATIENT MESSAGE (OUTPATIENT)
Dept: FAMILY MEDICINE CLINIC | Age: 79
End: 2024-12-02

## 2024-12-02 VITALS
TEMPERATURE: 98 F | HEART RATE: 57 BPM | DIASTOLIC BLOOD PRESSURE: 52 MMHG | SYSTOLIC BLOOD PRESSURE: 131 MMHG | OXYGEN SATURATION: 97 % | WEIGHT: 256 LBS | BODY MASS INDEX: 35.84 KG/M2 | HEIGHT: 71 IN | RESPIRATION RATE: 17 BRPM

## 2024-12-02 DIAGNOSIS — E11.65 UNCONTROLLED TYPE 2 DIABETES MELLITUS WITH HYPERGLYCEMIA (HCC): ICD-10-CM

## 2024-12-02 DIAGNOSIS — I50.32 CHRONIC DIASTOLIC HEART FAILURE (HCC): ICD-10-CM

## 2024-12-02 DIAGNOSIS — N18.31 STAGE 3A CHRONIC KIDNEY DISEASE (HCC): ICD-10-CM

## 2024-12-02 DIAGNOSIS — I50.32 CHRONIC HEART FAILURE WITH PRESERVED EJECTION FRACTION (HCC): ICD-10-CM

## 2024-12-02 DIAGNOSIS — J44.9 CHRONIC OBSTRUCTIVE PULMONARY DISEASE, UNSPECIFIED COPD TYPE (HCC): ICD-10-CM

## 2024-12-02 DIAGNOSIS — F10.20 ALCOHOLISM (HCC): ICD-10-CM

## 2024-12-02 DIAGNOSIS — Z00.00 MEDICARE ANNUAL WELLNESS VISIT, SUBSEQUENT: Primary | ICD-10-CM

## 2024-12-02 LAB
ALBUMIN SERPL-MCNC: 3.8 G/DL (ref 3.5–5.2)
ALP SERPL-CCNC: 82 U/L (ref 40–129)
ALT SERPL-CCNC: 17 U/L (ref 0–40)
ANION GAP SERPL CALCULATED.3IONS-SCNC: 10 MMOL/L (ref 7–16)
AST SERPL-CCNC: 23 U/L (ref 0–39)
BASOPHILS # BLD: 0.03 K/UL (ref 0–0.2)
BASOPHILS NFR BLD: 0 % (ref 0–2)
BILIRUB SERPL-MCNC: 0.5 MG/DL (ref 0–1.2)
BUN SERPL-MCNC: 29 MG/DL (ref 6–23)
CALCIUM SERPL-MCNC: 9.5 MG/DL (ref 8.6–10.2)
CHLORIDE SERPL-SCNC: 103 MMOL/L (ref 98–107)
CO2 SERPL-SCNC: 28 MMOL/L (ref 22–29)
CREAT SERPL-MCNC: 1.5 MG/DL (ref 0.7–1.2)
EOSINOPHIL # BLD: 0.39 K/UL (ref 0.05–0.5)
EOSINOPHILS RELATIVE PERCENT: 6 % (ref 0–6)
ERYTHROCYTE [DISTWIDTH] IN BLOOD BY AUTOMATED COUNT: 13.8 % (ref 11.5–15)
GFR, ESTIMATED: 46 ML/MIN/1.73M2
GLUCOSE SERPL-MCNC: 160 MG/DL (ref 74–99)
HCT VFR BLD AUTO: 32.6 % (ref 37–54)
HGB BLD-MCNC: 10.5 G/DL (ref 12.5–16.5)
IMM GRANULOCYTES # BLD AUTO: 0.04 K/UL (ref 0–0.58)
IMM GRANULOCYTES NFR BLD: 1 % (ref 0–5)
LYMPHOCYTES NFR BLD: 1.91 K/UL (ref 1.5–4)
LYMPHOCYTES RELATIVE PERCENT: 28 % (ref 20–42)
MCH RBC QN AUTO: 30.3 PG (ref 26–35)
MCHC RBC AUTO-ENTMCNC: 32.2 G/DL (ref 32–34.5)
MCV RBC AUTO: 94.2 FL (ref 80–99.9)
MONOCYTES NFR BLD: 1.03 K/UL (ref 0.1–0.95)
MONOCYTES NFR BLD: 15 % (ref 2–12)
NEUTROPHILS NFR BLD: 50 % (ref 43–80)
NEUTS SEG NFR BLD: 3.36 K/UL (ref 1.8–7.3)
PLATELET # BLD AUTO: 265 K/UL (ref 130–450)
PMV BLD AUTO: 9.6 FL (ref 7–12)
POTASSIUM SERPL-SCNC: 4.3 MMOL/L (ref 3.5–5)
PROT SERPL-MCNC: 6.6 G/DL (ref 6.4–8.3)
RBC # BLD AUTO: 3.46 M/UL (ref 3.8–5.8)
SODIUM SERPL-SCNC: 141 MMOL/L (ref 132–146)
TSH SERPL DL<=0.05 MIU/L-ACNC: 0.97 UIU/ML (ref 0.27–4.2)
WBC OTHER # BLD: 6.8 K/UL (ref 4.5–11.5)

## 2024-12-02 PROCEDURE — 85025 COMPLETE CBC W/AUTO DIFF WBC: CPT

## 2024-12-02 PROCEDURE — 84443 ASSAY THYROID STIM HORMONE: CPT

## 2024-12-02 PROCEDURE — G8484 FLU IMMUNIZE NO ADMIN: HCPCS | Performed by: FAMILY MEDICINE

## 2024-12-02 PROCEDURE — 80053 COMPREHEN METABOLIC PANEL: CPT

## 2024-12-02 PROCEDURE — 36415 COLL VENOUS BLD VENIPUNCTURE: CPT

## 2024-12-02 PROCEDURE — 3075F SYST BP GE 130 - 139MM HG: CPT | Performed by: FAMILY MEDICINE

## 2024-12-02 PROCEDURE — G0439 PPPS, SUBSEQ VISIT: HCPCS | Performed by: FAMILY MEDICINE

## 2024-12-02 PROCEDURE — 1123F ACP DISCUSS/DSCN MKR DOCD: CPT | Performed by: FAMILY MEDICINE

## 2024-12-02 PROCEDURE — 1159F MED LIST DOCD IN RCRD: CPT | Performed by: FAMILY MEDICINE

## 2024-12-02 PROCEDURE — 3052F HG A1C>EQUAL 8.0%<EQUAL 9.0%: CPT | Performed by: FAMILY MEDICINE

## 2024-12-02 PROCEDURE — 3078F DIAST BP <80 MM HG: CPT | Performed by: FAMILY MEDICINE

## 2024-12-02 RX ORDER — INSULIN GLARGINE 100 [IU]/ML
10 INJECTION, SOLUTION SUBCUTANEOUS DAILY
Qty: 4 ADJUSTABLE DOSE PRE-FILLED PEN SYRINGE | Refills: 0 | Status: SHIPPED | OUTPATIENT
Start: 2024-12-02

## 2024-12-02 RX ORDER — MAGNESIUM OXIDE 400 MG/1
400 TABLET ORAL DAILY
Qty: 90 TABLET | Refills: 1 | Status: SHIPPED | OUTPATIENT
Start: 2024-12-30

## 2024-12-02 RX ORDER — BUDESONIDE AND FORMOTEROL FUMARATE DIHYDRATE 160; 4.5 UG/1; UG/1
2 AEROSOL RESPIRATORY (INHALATION) 2 TIMES DAILY
Qty: 10.2 G | Refills: 0 | Status: CANCELLED | OUTPATIENT
Start: 2024-12-02

## 2024-12-02 RX ORDER — BUDESONIDE AND FORMOTEROL FUMARATE DIHYDRATE 160; 4.5 UG/1; UG/1
2 AEROSOL RESPIRATORY (INHALATION) 2 TIMES DAILY
Qty: 40.8 G | Refills: 1 | Status: CANCELLED | OUTPATIENT
Start: 2024-12-30

## 2024-12-02 RX ORDER — LANOLIN ALCOHOL/MO/W.PET/CERES
400 CREAM (GRAM) TOPICAL DAILY
Qty: 30 TABLET | Refills: 0 | Status: SHIPPED
Start: 2024-12-02 | End: 2024-12-06 | Stop reason: SDUPTHER

## 2024-12-02 RX ORDER — LANOLIN ALCOHOL/MO/W.PET/CERES
400 CREAM (GRAM) TOPICAL DAILY
COMMUNITY
Start: 2024-11-21 | End: 2024-12-02 | Stop reason: SDUPTHER

## 2024-12-02 RX ORDER — INSULIN GLARGINE 100 [IU]/ML
10 INJECTION, SOLUTION SUBCUTANEOUS DAILY
Qty: 9 ADJUSTABLE DOSE PRE-FILLED PEN SYRINGE | Refills: 1 | Status: SHIPPED | OUTPATIENT
Start: 2024-12-30

## 2024-12-02 RX ORDER — FOLIC ACID 1 MG/1
1 TABLET ORAL DAILY
Qty: 90 TABLET | Refills: 1 | Status: SHIPPED | OUTPATIENT
Start: 2024-12-30

## 2024-12-02 RX ORDER — UMECLIDINIUM 62.5 UG/1
1 AEROSOL, POWDER ORAL DAILY
COMMUNITY
Start: 2024-11-21 | End: 2024-12-02 | Stop reason: SDUPTHER

## 2024-12-02 RX ORDER — UMECLIDINIUM 62.5 UG/1
1 AEROSOL, POWDER ORAL DAILY
Qty: 90 EACH | Refills: 1 | Status: SHIPPED | OUTPATIENT
Start: 2024-12-30

## 2024-12-02 RX ORDER — UMECLIDINIUM 62.5 UG/1
1 AEROSOL, POWDER ORAL DAILY
Qty: 30 EACH | Refills: 0 | Status: SHIPPED | OUTPATIENT
Start: 2024-12-02

## 2024-12-02 RX ORDER — FUROSEMIDE 40 MG/1
40 TABLET ORAL DAILY
Qty: 90 TABLET | Refills: 3 | Status: SHIPPED | OUTPATIENT
Start: 2024-12-02

## 2024-12-02 RX ORDER — BUDESONIDE AND FORMOTEROL FUMARATE DIHYDRATE 160; 4.5 UG/1; UG/1
2 AEROSOL RESPIRATORY (INHALATION) 2 TIMES DAILY
Qty: 6 EACH | Refills: 3 | Status: SHIPPED
Start: 2024-12-02 | End: 2024-12-06 | Stop reason: SDUPTHER

## 2024-12-02 RX ORDER — BUDESONIDE AND FORMOTEROL FUMARATE DIHYDRATE 160; 4.5 UG/1; UG/1
2 AEROSOL RESPIRATORY (INHALATION) 2 TIMES DAILY
Qty: 2 EACH | Refills: 0 | Status: SHIPPED
Start: 2024-12-02 | End: 2024-12-06

## 2024-12-02 RX ORDER — FOLIC ACID 1 MG/1
1 TABLET ORAL DAILY
Qty: 30 TABLET | Refills: 0 | Status: SHIPPED | OUTPATIENT
Start: 2024-12-02

## 2024-12-02 RX ORDER — ACYCLOVIR 400 MG/1
3 TABLET ORAL
Qty: 9 EACH | Refills: 3 | Status: SHIPPED
Start: 2024-12-02 | End: 2024-12-04 | Stop reason: SDUPTHER

## 2024-12-02 ASSESSMENT — PATIENT HEALTH QUESTIONNAIRE - PHQ9
SUM OF ALL RESPONSES TO PHQ QUESTIONS 1-9: 0
1. LITTLE INTEREST OR PLEASURE IN DOING THINGS: NOT AT ALL
SUM OF ALL RESPONSES TO PHQ QUESTIONS 1-9: 0
2. FEELING DOWN, DEPRESSED OR HOPELESS: NOT AT ALL
SUM OF ALL RESPONSES TO PHQ9 QUESTIONS 1 & 2: 0
SUM OF ALL RESPONSES TO PHQ QUESTIONS 1-9: 0
SUM OF ALL RESPONSES TO PHQ QUESTIONS 1-9: 0

## 2024-12-02 NOTE — PATIENT INSTRUCTIONS
Chest pain or pressure, or a strange feeling in the chest.     Sweating.     Shortness of breath.     Pain, pressure, or a strange feeling in the back, neck, jaw, or upper belly or in one or both shoulders or arms.     Lightheadedness or sudden weakness.     A fast or irregular heartbeat.   After you call 911, the  may tell you to chew 1 adult-strength or 2 to 4 low-dose aspirin. Wait for an ambulance. Do not try to drive yourself.  Watch closely for changes in your health, and be sure to contact your doctor if you have any problems.  Where can you learn more?  Go to https://www.[employer].net/patientEd and enter F075 to learn more about \"A Healthy Heart: Care Instructions.\"  Current as of: June 24, 2023  Content Version: 14.2  © 2024 [employer].   Care instructions adapted under license by [employer]. If you have questions about a medical condition or this instruction, always ask your healthcare professional. [employer] disclaims any warranty or liability for your use of this information.      Personalized Preventive Plan for Mason Gudino - 12/2/2024  Medicare offers a range of preventive health benefits. Some of the tests and screenings are paid in full while other may be subject to a deductible, co-insurance, and/or copay.    Some of these benefits include a comprehensive review of your medical history including lifestyle, illnesses that may run in your family, and various assessments and screenings as appropriate.    After reviewing your medical record and screening and assessments performed today your provider may have ordered immunizations, labs, imaging, and/or referrals for you.  A list of these orders (if applicable) as well as your Preventive Care list are included within your After Visit Summary for your review.    Other Preventive Recommendations:    A preventive eye exam performed by an eye specialist is recommended every 1-2 years to screen for glaucoma;

## 2024-12-02 NOTE — PROGRESS NOTES
MD   INCRUSE ELLIPTA 62.5 MCG/ACT inhaler Inhale 1 puff into the lungs daily Yes Abhinav Elkins MD   magnesium oxide (MAG-OX) 400 (240 Mg) MG tablet Take 1 tablet by mouth daily Yes Abhinav Elkins MD   insulin glargine (LANTUS SOLOSTAR) 100 UNIT/ML injection pen Inject 10 Units into the skin daily Yes Abhinav Elkins MD   folic acid (FOLVITE) 1 MG tablet Take 1 tablet by mouth daily Yes Abhinav Elkins MD   umeclidinium bromide (INCRUSE ELLIPTA) 62.5 MCG/ACT inhaler Inhale 1 puff into the lungs daily Yes Abhinav Elkins MD   magnesium oxide (MAG-OX) 400 MG tablet Take 1 tablet by mouth daily Yes Abhinav Elkins MD   folic acid (FOLVITE) 1 MG tablet Take 1 tablet by mouth daily Yes Abhinav Elkins MD   insulin glargine (LANTUS SOLOSTAR) 100 UNIT/ML injection pen Inject 10 Units into the skin daily Yes Abhinav Elkins MD   budesonide-formoterol (SYMBICORT) 160-4.5 MCG/ACT AERO Inhale 2 puffs into the lungs 2 times daily Yes Abhinav Elkins MD   budesonide-formoterol (SYMBICORT) 160-4.5 MCG/ACT AERO Inhale 2 puffs into the lungs 2 times daily Yes Abhinav Elkins MD   glucose 4 g chewable tablet Take 4 tablets by mouth as needed for Low blood sugar Yes Roosevelt Mercado MD   metoprolol succinate (TOPROL XL) 25 MG extended release tablet Take 1 tablet by mouth every morning Yes Roosevelt Mercado MD   polyethylene glycol (GLYCOLAX) 17 g packet Take 1 packet by mouth daily as needed for Constipation Yes Roosevelt Mercado MD   Ergocalciferol (VITAMIN D) 88578 units CAPS Take 50,000 Units by mouth once a week Yes Roosevelt Mercado MD   FreeStyle Lancets [facility] CHECK BLOOD SUGAR ONCE DAILY Yes Abhinav Elkins MD   acetaminophen (TYLENOL) 500 MG tablet Take 2 tablets by mouth every 8 hours as needed Yes Sue Mehta MD   apixaban (ELIQUIS) 2.5 MG TABS tablet Take 1 tablet by mouth 2 times daily Yes Abhinav Elkins MD   donepezil (ARICEPT) 10 MG tablet TAKE 1 TABLET BY

## 2024-12-04 ENCOUNTER — TELEPHONE (OUTPATIENT)
Dept: FAMILY MEDICINE CLINIC | Age: 79
End: 2024-12-04

## 2024-12-04 DIAGNOSIS — L89.90 PRESSURE INJURY OF SKIN, UNSPECIFIED INJURY STAGE, UNSPECIFIED LOCATION: Primary | ICD-10-CM

## 2024-12-04 RX ORDER — ACYCLOVIR 400 MG/1
1 TABLET ORAL
Qty: 9 EACH | Refills: 3 | OUTPATIENT
Start: 2024-12-04

## 2024-12-04 NOTE — TELEPHONE ENCOUNTER
Received call from nurse at [facility]  Patient has a stage 3 pressure ulcer on buttocks, across both left and right cheeks. They forgot to mention this yesterday and have you look at it.     The nurse is wondering if they can order and apply calcium alginate. They are monitoring the wound.     Any questions, you can call the nurse (guille I forgot her name) at [phone]

## 2024-12-04 NOTE — TELEPHONE ENCOUNTER
Pharmacy calling for corrected RX for Dexcom G7 Sensors ordered on 12/2/2024. Prescription was written for \"3 sensors every 10 days\".     Verbal order given to pharmacist  for \"1 sensor every 10 days\".

## 2024-12-04 NOTE — TELEPHONE ENCOUNTER
Stage 3 I prefer to send to wound care.      Ok to tx with Ca Alginate as suggested.  Ideally should have this seen within a week.

## 2024-12-05 DIAGNOSIS — R06.09 DYSPNEA ON EXERTION: ICD-10-CM

## 2024-12-05 DIAGNOSIS — I25.10 CORONARY ARTERY DISEASE INVOLVING NATIVE CORONARY ARTERY OF NATIVE HEART WITHOUT ANGINA PECTORIS: ICD-10-CM

## 2024-12-05 NOTE — TELEPHONE ENCOUNTER
I prefer scheduled with wound care next week for this.     Given appt on 17th if they can't be seen sooner lets see if we can eval here anytime Tuesday.

## 2024-12-05 NOTE — TELEPHONE ENCOUNTER
Spoke with Farnaz at [place]  Advised ok to treat. Would like to see patient next week. She will have daughter call to schedule this.

## 2024-12-06 ENCOUNTER — OFFICE VISIT (OUTPATIENT)
Dept: FAMILY MEDICINE CLINIC | Age: 79
End: 2024-12-06
Payer: MEDICARE

## 2024-12-06 VITALS
HEART RATE: 72 BPM | DIASTOLIC BLOOD PRESSURE: 69 MMHG | TEMPERATURE: 98.2 F | BODY MASS INDEX: 35.7 KG/M2 | OXYGEN SATURATION: 97 % | SYSTOLIC BLOOD PRESSURE: 118 MMHG | WEIGHT: 256 LBS | RESPIRATION RATE: 18 BRPM

## 2024-12-06 DIAGNOSIS — L89.303 PRESSURE INJURY OF BUTTOCK, STAGE 3, UNSPECIFIED LATERALITY (HCC): ICD-10-CM

## 2024-12-06 DIAGNOSIS — E66.01 MORBID (SEVERE) OBESITY DUE TO EXCESS CALORIES: ICD-10-CM

## 2024-12-06 DIAGNOSIS — J44.9 CHRONIC OBSTRUCTIVE PULMONARY DISEASE, UNSPECIFIED COPD TYPE (HCC): ICD-10-CM

## 2024-12-06 DIAGNOSIS — S31.809A WOUND OF BUTTOCK, UNSPECIFIED LATERALITY, INITIAL ENCOUNTER: Primary | ICD-10-CM

## 2024-12-06 PROCEDURE — 1111F DSCHRG MED/CURRENT MED MERGE: CPT

## 2024-12-06 PROCEDURE — 1123F ACP DISCUSS/DSCN MKR DOCD: CPT

## 2024-12-06 PROCEDURE — G8484 FLU IMMUNIZE NO ADMIN: HCPCS

## 2024-12-06 PROCEDURE — 3074F SYST BP LT 130 MM HG: CPT

## 2024-12-06 PROCEDURE — 1036F TOBACCO NON-USER: CPT

## 2024-12-06 PROCEDURE — G8427 DOCREV CUR MEDS BY ELIG CLIN: HCPCS

## 2024-12-06 PROCEDURE — 1159F MED LIST DOCD IN RCRD: CPT

## 2024-12-06 PROCEDURE — 99213 OFFICE O/P EST LOW 20 MIN: CPT

## 2024-12-06 PROCEDURE — 3078F DIAST BP <80 MM HG: CPT

## 2024-12-06 PROCEDURE — 3023F SPIROM DOC REV: CPT

## 2024-12-06 PROCEDURE — G8417 CALC BMI ABV UP PARAM F/U: HCPCS

## 2024-12-06 RX ORDER — LANOLIN ALCOHOL/MO/W.PET/CERES
400 CREAM (GRAM) TOPICAL DAILY
Qty: 90 TABLET | Refills: 0 | Status: SHIPPED | OUTPATIENT
Start: 2024-12-06

## 2024-12-06 RX ORDER — MINERAL OIL/HYDROPHIL PETROLAT
OINTMENT (GRAM) TOPICAL
Qty: 198 G | Refills: 1 | Status: SHIPPED | OUTPATIENT
Start: 2024-12-06

## 2024-12-06 RX ORDER — NITROGLYCERIN 0.4 MG/1
TABLET SUBLINGUAL
Qty: 100 TABLET | Refills: 3 | Status: SHIPPED | OUTPATIENT
Start: 2024-12-06

## 2024-12-06 RX ORDER — BUDESONIDE AND FORMOTEROL FUMARATE DIHYDRATE 160; 4.5 UG/1; UG/1
2 AEROSOL RESPIRATORY (INHALATION) 2 TIMES DAILY
Qty: 3 EACH | Refills: 3 | Status: SHIPPED | OUTPATIENT
Start: 2024-12-06

## 2024-12-06 RX ORDER — MUPIROCIN 20 MG/G
OINTMENT TOPICAL
Qty: 30 G | Refills: 2 | Status: SHIPPED | OUTPATIENT
Start: 2024-12-06 | End: 2024-12-13

## 2024-12-06 NOTE — TELEPHONE ENCOUNTER
This was reported Stage III per home nursing and they forgot to eval it in office etc.  I prefer wound center to follow a wound this deep and their appt was out to 12/17 so I felt an eval here to ensure no infection etc was prudent.        Agree with wound center taking over management ASAP.  If significant concern would consider contacting wound center today.

## 2024-12-06 NOTE — PROGRESS NOTES
[facility]  Precepting Note    Subjective:  Wound on buttock  Recent TKA, has home health  Has referral to wound care    ROS otherwise negative     Past medical, surgical, family and social history were reviewed, non-contributory, and unchanged unless otherwise stated.    Objective:    /69   Pulse 72   Temp 98.2 °F (36.8 °C) (Temporal)   Resp 18   Wt 116.1 kg (256 lb)   SpO2 97%   BMI 35.70 kg/m²     Exam is as noted by resident     Assessment/Plan:  Pressure ulcer in gluteal area  Aquaphor ointment, Bactroban  Follow up with wound care     Attending Physician Statement  I have reviewed the chart, including any radiology or labs. I have discussed the case, including pertinent history and exam findings with the resident.  I agree with the assessment, plan and orders as documented by the resident.  Please refer to the resident note for additional information.      Electronically signed by ISMAEL RENDON MD on 12/6/2024 at 2:46 PM   
folic acid (FOLVITE) 1 MG tablet, Take 1 tablet by mouth daily, Disp: 90 tablet, Rfl: 1    [START ON 12/30/2024] insulin glargine (LANTUS SOLOSTAR) 100 UNIT/ML injection pen, Inject 10 Units into the skin daily, Disp: 9 Adjustable Dose Pre-filled Pen Syringe, Rfl: 1    glucose 4 g chewable tablet, Take 4 tablets by mouth as needed for Low blood sugar, Disp: , Rfl:     metoprolol succinate (TOPROL XL) 25 MG extended release tablet, Take 1 tablet by mouth every morning, Disp: 30 tablet, Rfl: 3    Ergocalciferol (VITAMIN D) 48875 units CAPS, Take 50,000 Units by mouth once a week, Disp: , Rfl:     FreeStyle Lancets MISC, CHECK BLOOD SUGAR ONCE DAILY, Disp: 100 each, Rfl: 3    acetaminophen (TYLENOL) 500 MG tablet, Take 2 tablets by mouth every 8 hours as needed, Disp: , Rfl:     apixaban (ELIQUIS) 2.5 MG TABS tablet, Take 1 tablet by mouth 2 times daily, Disp: 180 tablet, Rfl: 3    donepezil (ARICEPT) 10 MG tablet, TAKE 1 TABLET BY MOUTH EVERY  NIGHT, Disp: 90 tablet, Rfl: 3    amiodarone (CORDARONE) 200 MG tablet, Take 1 tablet by mouth daily (Patient taking differently: Take 1 tablet by mouth every morning), Disp: 90 tablet, Rfl: 3    atorvastatin (LIPITOR) 40 MG tablet, Take 1 tablet by mouth daily, Disp: 90 tablet, Rfl: 3    tamsulosin (FLOMAX) 0.4 MG capsule, TAKE 1 CAPSULE BY MOUTH DAILY (Patient taking differently: Take 1 capsule by mouth every morning), Disp: 90 capsule, Rfl: 3    Compression Stockings MISC, by Does not apply route Dispense one pair of compression stockings 20/30 mmHg   Ankle size 25cm  Calf size 43cm, Disp: 1 each, Rfl: 0    blood glucose monitor strips, Test daily times a day & as needed for symptoms of irregular blood glucose. Dispense sufficient amount for indicated testing frequency plus additional to accommodate PRN testing needs., Disp: 100 strip, Rfl: 5    glucose monitoring kit, 1 kit by Does not apply route daily, Disp: 1 kit, Rfl: 0    nitroGLYCERIN (NITROSTAT) 0.4 MG SL tablet,

## 2024-12-06 NOTE — TELEPHONE ENCOUNTER
Patient scheduled with Dr Solorio today to check wound.   Called wound care center to see if they can see if they could see him sooner as well. Had to leave a message.

## 2024-12-11 NOTE — DISCHARGE INSTRUCTIONS
Visit Discharge/Physician Orders    Discharge condition: Stable    Assessment of pain at discharge: yes    Anesthetic used: 4% lidocaine external    Discharge to: Home    Left via:Private automobile    Accompanied by: accompanied by family    ECF/HHA: [facility] *NEW ORDERS*    Dressing Orders:     To coccyx wound, cleanse with normal saline solution and apply alginate and cover with a silicone bordered (Mepilex or Zetuvit brand work best) dressing. Change daily.    Treatment Orders:    FOLLOW NUTRITIOUS DIET. CHOOSE FOODS HIGH IN PROTEIN -CHICKEN- FISH-AND EGGS,  CHOOSE FOODS HIGH IN VITAMIN C.   MULTIVITAMIN DAILY.      Keep pressure off wound site as much as possible by turning and repositioning to relieve pressure sites    [facility] followup visit ________2 weeks due to holiday_____________________  (Please note your next appointment above and if you are unable to keep, kindly give a 24 hour notice. Thank you.)    Physician signature:__________________________      If you experience any of the following, please call the Wound Care Center during business hours:    * Increase in Pain  * Temperature over 101  * Increase in drainage from your wound  * Drainage with a foul odor  * Bleeding  * Increase in swelling  * Need for compression bandage changes due to slippage, breakthrough drainage.    If you need medical attention outside of the business hours of the Wound Care Centers please contact your PCP or go to the nearest emergency room.

## 2024-12-12 DIAGNOSIS — I25.10 CORONARY ARTERY DISEASE INVOLVING NATIVE CORONARY ARTERY OF NATIVE HEART, UNSPECIFIED WHETHER ANGINA PRESENT: ICD-10-CM

## 2024-12-12 DIAGNOSIS — I48.0 PAROXYSMAL ATRIAL FIBRILLATION (HCC): ICD-10-CM

## 2024-12-13 ENCOUNTER — OFFICE VISIT (OUTPATIENT)
Dept: ORTHOPEDIC SURGERY | Age: 79
End: 2024-12-13

## 2024-12-13 VITALS
RESPIRATION RATE: 18 BRPM | WEIGHT: 256 LBS | HEART RATE: 64 BPM | DIASTOLIC BLOOD PRESSURE: 68 MMHG | TEMPERATURE: 98.7 F | HEIGHT: 71 IN | SYSTOLIC BLOOD PRESSURE: 124 MMHG | BODY MASS INDEX: 35.84 KG/M2 | OXYGEN SATURATION: 96 %

## 2024-12-13 DIAGNOSIS — I25.10 CORONARY ARTERY DISEASE INVOLVING NATIVE CORONARY ARTERY OF NATIVE HEART, UNSPECIFIED WHETHER ANGINA PRESENT: ICD-10-CM

## 2024-12-13 DIAGNOSIS — I48.0 PAROXYSMAL ATRIAL FIBRILLATION (HCC): ICD-10-CM

## 2024-12-13 DIAGNOSIS — M17.12 PRIMARY OSTEOARTHRITIS OF LEFT KNEE: ICD-10-CM

## 2024-12-13 DIAGNOSIS — Z96.652 S/P TOTAL KNEE REPLACEMENT, LEFT: Primary | ICD-10-CM

## 2024-12-13 RX ORDER — ATORVASTATIN CALCIUM 40 MG/1
40 TABLET, FILM COATED ORAL DAILY
Qty: 90 TABLET | Refills: 3 | Status: SHIPPED | OUTPATIENT
Start: 2024-12-13

## 2024-12-13 RX ORDER — METOPROLOL SUCCINATE 50 MG/1
50 TABLET, EXTENDED RELEASE ORAL DAILY
Qty: 90 TABLET | Refills: 3 | OUTPATIENT
Start: 2024-12-13

## 2024-12-13 NOTE — TELEPHONE ENCOUNTER
Name of Medication(s) Requested:  Requested Prescriptions     Pending Prescriptions Disp Refills    atorvastatin (LIPITOR) 40 MG tablet [Pharmacy Med Name: Atorvastatin Calcium 40 MG Oral Tablet] 90 tablet 3     Sig: TAKE 1 TABLET BY MOUTH DAILY     Refused Prescriptions Disp Refills    metoprolol succinate (TOPROL XL) 50 MG extended release tablet [Pharmacy Med Name: Metoprolol Succinate ER 50 MG Oral Tablet Extended Release 24 Hour] 90 tablet 3     Sig: Take 1 tablet by mouth daily       Medication is on current medication list Yes    Dosage and directions were verified? Yes    Quantity verified: 90 day supply     Pharmacy Verified?  Yes    Last Appointment:  12/2/2024    Future appts:  Future Appointments   Date Time Provider Department Center   12/17/2024 10:00 AM Alphonse Gomez MD [facility] WOUND [facility]   1/16/2025 11:00 AM Abhinav Elkins MD [facility] DEP   1/24/2025  9:30 AM Lázaro Blackburn MD SE [facility] ORTHO [facility]   5/15/2025  7:45 AM Phil Amin MD [facility]nie [facility]   5/27/2025  3:00 PM Nemesio Hernandez APRN - CNS [facility]   12/8/2025 11:00 AM Abhinav Elkins MD [facility] DEP

## 2024-12-13 NOTE — PROGRESS NOTES
[facility]   ORTHOPAEDIC SURGERY AND SPORTS MEDICINE  DATE OF VISIT: 12/13/24  Follow Up Post Operative Visit     CHIEF COMPLAINT:   Chief Complaint   Patient presents with    Post-Op Check     Pt is here 6 weeks out from a adithya robot assisted left total knee arthroplasty. Overall doing well.       Surgery: Adithya Robot Assisted Left total knee arthroplasty   Date: 10/29/2024       Subjective:    Mason Gudino is here for follow up visit s/p above procedure.  He is doing well.  He has been working with a home therapist.  He is using a walker and cane.  He has no pain.  He does have some back pain    Controlled Substances Monitoring:        Physical Exam:    Height: 1.803 m (5' 11\"), Weight - Scale: 116.1 kg (256 lb), BP: 124/68    General: Alert and oriented x3, no acute distress  Cardiovascular/pulmonary: No labored breathing, peripheral perfusion intact  Musculoskeletal:    Exam of the knee shows healed incision.  Range of motion is about 5 to 120 degrees.  The knee is stable.  There is no swelling      Imaging: X-rays show well aligned total knee arthroplasty    Assessment and Plan: 6 weeks out from left total knee arthroplasty  He is doing very well.  He will continue with physical therapy.  We will see him back in 6 weeks.    Lázaro Blackburn MD  Orthopaedic Surgery   12/13/24  9:51 AM

## 2024-12-16 RX ORDER — METOPROLOL SUCCINATE 50 MG/1
50 TABLET, EXTENDED RELEASE ORAL DAILY
Qty: 90 TABLET | Refills: 3 | OUTPATIENT
Start: 2024-12-16

## 2024-12-17 ENCOUNTER — HOSPITAL ENCOUNTER (OUTPATIENT)
Dept: WOUND CARE | Age: 79
Discharge: HOME | End: 2024-12-17
Payer: MEDICARE

## 2024-12-17 VITALS
TEMPERATURE: 98.3 F | DIASTOLIC BLOOD PRESSURE: 74 MMHG | BODY MASS INDEX: 34.44 KG/M2 | RESPIRATION RATE: 18 BRPM | SYSTOLIC BLOOD PRESSURE: 157 MMHG | WEIGHT: 246 LBS | HEART RATE: 61 BPM | HEIGHT: 71 IN

## 2024-12-17 DIAGNOSIS — L89.152 PRESSURE ULCER OF COCCYGEAL REGION, STAGE 2 (HCC): Primary | ICD-10-CM

## 2024-12-17 PROCEDURE — 11042 DBRDMT SUBQ TIS 1ST 20SQCM/<: CPT | Performed by: NURSE PRACTITIONER

## 2024-12-17 PROCEDURE — 99203 OFFICE O/P NEW LOW 30 MIN: CPT | Performed by: NURSE PRACTITIONER

## 2024-12-17 PROCEDURE — 99213 OFFICE O/P EST LOW 20 MIN: CPT

## 2024-12-17 PROCEDURE — 11042 DBRDMT SUBQ TIS 1ST 20SQCM/<: CPT

## 2024-12-17 RX ORDER — GENTAMICIN SULFATE 1 MG/G
OINTMENT TOPICAL ONCE
OUTPATIENT
Start: 2024-12-17 | End: 2024-12-17

## 2024-12-17 RX ORDER — TRIAMCINOLONE ACETONIDE 1 MG/G
OINTMENT TOPICAL ONCE
OUTPATIENT
Start: 2024-12-17 | End: 2024-12-17

## 2024-12-17 RX ORDER — LIDOCAINE HYDROCHLORIDE 20 MG/ML
JELLY TOPICAL ONCE
OUTPATIENT
Start: 2024-12-17 | End: 2024-12-17

## 2024-12-17 RX ORDER — MUPIROCIN 20 MG/G
OINTMENT TOPICAL ONCE
OUTPATIENT
Start: 2024-12-17 | End: 2024-12-17

## 2024-12-17 RX ORDER — SODIUM CHLOR/HYPOCHLOROUS ACID 0.033 %
SOLUTION, IRRIGATION IRRIGATION ONCE
OUTPATIENT
Start: 2024-12-17 | End: 2024-12-17

## 2024-12-17 RX ORDER — LIDOCAINE HYDROCHLORIDE 40 MG/ML
SOLUTION TOPICAL ONCE
OUTPATIENT
Start: 2024-12-17 | End: 2024-12-17

## 2024-12-17 RX ORDER — SILVER SULFADIAZINE 10 MG/G
CREAM TOPICAL ONCE
OUTPATIENT
Start: 2024-12-17 | End: 2024-12-17

## 2024-12-17 RX ORDER — BETAMETHASONE DIPROPIONATE 0.5 MG/G
CREAM TOPICAL ONCE
OUTPATIENT
Start: 2024-12-17 | End: 2024-12-17

## 2024-12-17 RX ORDER — BACITRACIN ZINC 500 [USP'U]/G
OINTMENT TOPICAL ONCE
OUTPATIENT
Start: 2024-12-17 | End: 2024-12-17

## 2024-12-17 RX ORDER — LIDOCAINE 40 MG/G
CREAM TOPICAL ONCE
OUTPATIENT
Start: 2024-12-17 | End: 2024-12-17

## 2024-12-17 RX ORDER — NEOMYCIN/BACITRACIN/POLYMYXINB 3.5-400-5K
OINTMENT (GRAM) TOPICAL ONCE
OUTPATIENT
Start: 2024-12-17 | End: 2024-12-17

## 2024-12-17 RX ORDER — LIDOCAINE 50 MG/G
OINTMENT TOPICAL ONCE
OUTPATIENT
Start: 2024-12-17 | End: 2024-12-17

## 2024-12-17 RX ORDER — CLOBETASOL PROPIONATE 0.5 MG/G
OINTMENT TOPICAL ONCE
OUTPATIENT
Start: 2024-12-17 | End: 2024-12-17

## 2024-12-17 RX ORDER — BACITRACIN ZINC AND POLYMYXIN B SULFATE 500; 1000 [USP'U]/G; [USP'U]/G
OINTMENT TOPICAL ONCE
OUTPATIENT
Start: 2024-12-17 | End: 2024-12-17

## 2024-12-17 NOTE — H&P
automobile    Accompanied by: accompanied by family    ECF/HHA: [facility] *NEW ORDERS*    Dressing Orders:     To coccyx wound, cleanse with normal saline solution and apply alginate and cover with a silicone bordered (Mepilex or Zetuvit brand work best) dressing. Change daily.    Treatment Orders:    FOLLOW NUTRITIOUS DIET. CHOOSE FOODS HIGH IN PROTEIN -CHICKEN- FISH-AND EGGS,  CHOOSE FOODS HIGH IN VITAMIN C.   MULTIVITAMIN DAILY.      Keep pressure off wound site as much as possible by turning and repositioning to relieve pressure sites    [facility] followup visit ________2 weeks due to holiday_____________________  (Please note your next appointment above and if you are unable to keep, kindly give a 24 hour notice. Thank you.)    Physician signature:__________________________      If you experience any of the following, please call the Wound Care Center during business hours:    * Increase in Pain  * Temperature over 101  * Increase in drainage from your wound  * Drainage with a foul odor  * Bleeding  * Increase in swelling  * Need for compression bandage changes due to slippage, breakthrough drainage.    If you need medical attention outside of the business hours of the Wound Care Centers please contact your PCP or go to the nearest emergency room.        Electronically signed by WALI Hardy CNP on 12/17/2024 at 11:08 AM

## 2024-12-19 NOTE — DISCHARGE INSTRUCTIONS
Visit Discharge/Physician Orders     Discharge condition: Stable     Assessment of pain at discharge: yes     Anesthetic used: 4% lidocaine external     Discharge to: Home     Left via:Private automobile     Accompanied by: accompanied by family     ECF/[facility]: [facility] *NEW ORDERS*     Dressing Orders:      To coccyx wound, cleanse with normal saline solution and apply alginate and cover with a silicone bordered (Mepilex or Zetuvit brand work best) dressing. Change daily.     Treatment Orders:     FOLLOW NUTRITIOUS DIET. CHOOSE FOODS HIGH IN PROTEIN -CHICKEN- FISH-AND EGGS,  CHOOSE FOODS HIGH IN VITAMIN C.   MULTIVITAMIN DAILY.       Keep pressure off wound site as much as possible by turning and repositioning to relieve pressure sites     [facility] followup visit ________1 week_____________________  (Please note your next appointment above and if you are unable to keep, kindly give a 24 hour notice. Thank you.)     Physician signature:__________________________        If you experience any of the following, please call the Wound Care Center during business hours:     * Increase in Pain  * Temperature over 101  * Increase in drainage from your wound  * Drainage with a foul odor  * Bleeding  * Increase in swelling  * Need for compression bandage changes due to slippage, breakthrough drainage.     If you need medical attention outside of the business hours of the Wound Care Centers please contact your PCP or go to the nearest emergency room.

## 2024-12-23 ENCOUNTER — TELEPHONE (OUTPATIENT)
Dept: FAMILY MEDICINE CLINIC | Age: 79
End: 2024-12-23

## 2024-12-23 ENCOUNTER — HOSPITAL ENCOUNTER (EMERGENCY)
Age: 79
Discharge: HOME | End: 2024-12-23
Attending: STUDENT IN AN ORGANIZED HEALTH CARE EDUCATION/TRAINING PROGRAM
Payer: MEDICARE

## 2024-12-23 VITALS
SYSTOLIC BLOOD PRESSURE: 131 MMHG | HEART RATE: 60 BPM | DIASTOLIC BLOOD PRESSURE: 62 MMHG | OXYGEN SATURATION: 97 % | TEMPERATURE: 97.8 F | RESPIRATION RATE: 14 BRPM

## 2024-12-23 DIAGNOSIS — L02.91 ABSCESS: Primary | ICD-10-CM

## 2024-12-23 LAB
ALBUMIN SERPL-MCNC: 4 G/DL (ref 3.5–5.2)
ALP SERPL-CCNC: 74 U/L (ref 40–129)
ALT SERPL-CCNC: 24 U/L (ref 0–40)
ANION GAP SERPL CALCULATED.3IONS-SCNC: 10 MMOL/L (ref 7–16)
AST SERPL-CCNC: 21 U/L (ref 0–39)
BASOPHILS # BLD: 0.04 K/UL (ref 0–0.2)
BASOPHILS NFR BLD: 1 % (ref 0–2)
BILIRUB SERPL-MCNC: 0.4 MG/DL (ref 0–1.2)
BILIRUB UR QL STRIP: NEGATIVE
BUN SERPL-MCNC: 34 MG/DL (ref 6–23)
CALCIUM SERPL-MCNC: 9.7 MG/DL (ref 8.6–10.2)
CHLORIDE SERPL-SCNC: 102 MMOL/L (ref 98–107)
CLARITY UR: CLEAR
CO2 SERPL-SCNC: 29 MMOL/L (ref 22–29)
COLOR UR: YELLOW
CREAT SERPL-MCNC: 1.5 MG/DL (ref 0.7–1.2)
EOSINOPHIL # BLD: 0.3 K/UL (ref 0.05–0.5)
EOSINOPHILS RELATIVE PERCENT: 4 % (ref 0–6)
ERYTHROCYTE [DISTWIDTH] IN BLOOD BY AUTOMATED COUNT: 13.7 % (ref 11.5–15)
GFR, ESTIMATED: 48 ML/MIN/1.73M2
GLUCOSE SERPL-MCNC: 167 MG/DL (ref 74–99)
GLUCOSE UR STRIP-MCNC: NEGATIVE MG/DL
HCT VFR BLD AUTO: 33.9 % (ref 37–54)
HGB BLD-MCNC: 11.2 G/DL (ref 12.5–16.5)
HGB UR QL STRIP.AUTO: NEGATIVE
IMM GRANULOCYTES # BLD AUTO: 0.03 K/UL (ref 0–0.58)
IMM GRANULOCYTES NFR BLD: 0 % (ref 0–5)
KETONES UR STRIP-MCNC: NEGATIVE MG/DL
LACTATE BLDV-SCNC: 1.2 MMOL/L (ref 0.5–2.2)
LEUKOCYTE ESTERASE UR QL STRIP: NEGATIVE
LYMPHOCYTES NFR BLD: 1.85 K/UL (ref 1.5–4)
LYMPHOCYTES RELATIVE PERCENT: 23 % (ref 20–42)
MCH RBC QN AUTO: 30.9 PG (ref 26–35)
MCHC RBC AUTO-ENTMCNC: 33 G/DL (ref 32–34.5)
MCV RBC AUTO: 93.4 FL (ref 80–99.9)
MONOCYTES NFR BLD: 1 K/UL (ref 0.1–0.95)
MONOCYTES NFR BLD: 12 % (ref 2–12)
NEUTROPHILS NFR BLD: 60 % (ref 43–80)
NEUTS SEG NFR BLD: 4.89 K/UL (ref 1.8–7.3)
NITRITE UR QL STRIP: NEGATIVE
PH UR STRIP: 5.5 [PH] (ref 5–9)
PLATELET # BLD AUTO: 269 K/UL (ref 130–450)
PMV BLD AUTO: 9.8 FL (ref 7–12)
POTASSIUM SERPL-SCNC: 4.5 MMOL/L (ref 3.5–5)
PROT SERPL-MCNC: 6.7 G/DL (ref 6.4–8.3)
PROT UR STRIP-MCNC: ABNORMAL MG/DL
RBC # BLD AUTO: 3.63 M/UL (ref 3.8–5.8)
RBC #/AREA URNS HPF: ABNORMAL /HPF
SODIUM SERPL-SCNC: 141 MMOL/L (ref 132–146)
SP GR UR STRIP: 1.02 (ref 1–1.03)
UROBILINOGEN UR STRIP-ACNC: 1 EU/DL (ref 0–1)
WBC #/AREA URNS HPF: ABNORMAL /HPF
WBC OTHER # BLD: 8.1 K/UL (ref 4.5–11.5)

## 2024-12-23 PROCEDURE — 99283 EMERGENCY DEPT VISIT LOW MDM: CPT

## 2024-12-23 PROCEDURE — 81001 URINALYSIS AUTO W/SCOPE: CPT

## 2024-12-23 PROCEDURE — 6370000000 HC RX 637 (ALT 250 FOR IP): Performed by: STUDENT IN AN ORGANIZED HEALTH CARE EDUCATION/TRAINING PROGRAM

## 2024-12-23 PROCEDURE — 83605 ASSAY OF LACTIC ACID: CPT

## 2024-12-23 PROCEDURE — 85025 COMPLETE CBC W/AUTO DIFF WBC: CPT

## 2024-12-23 PROCEDURE — 80053 COMPREHEN METABOLIC PANEL: CPT

## 2024-12-23 RX ORDER — DOXYCYCLINE 100 MG/1
100 CAPSULE ORAL ONCE
Status: COMPLETED | OUTPATIENT
Start: 2024-12-23 | End: 2024-12-23

## 2024-12-23 RX ORDER — DOXYCYCLINE HYCLATE 100 MG
100 TABLET ORAL 2 TIMES DAILY
Qty: 14 TABLET | Refills: 0 | Status: SHIPPED | OUTPATIENT
Start: 2024-12-23 | End: 2024-12-30

## 2024-12-23 RX ADMIN — DOXYCYCLINE HYCLATE 100 MG: 100 CAPSULE ORAL at 17:17

## 2024-12-23 ASSESSMENT — ENCOUNTER SYMPTOMS
ABDOMINAL PAIN: 0
EYE DISCHARGE: 0
EYE REDNESS: 0
DIARRHEA: 0
SORE THROAT: 0
BACK PAIN: 0
EYE PAIN: 0
WHEEZING: 0
SHORTNESS OF BREATH: 0
VOMITING: 0
COUGH: 0
NAUSEA: 0
SINUS PRESSURE: 0

## 2024-12-23 ASSESSMENT — PAIN - FUNCTIONAL ASSESSMENT: PAIN_FUNCTIONAL_ASSESSMENT: NONE - DENIES PAIN

## 2024-12-23 NOTE — TELEPHONE ENCOUNTER
Called and spoke with son; Mason. Advised of Dr jarvis's message. Son will call wound care and if they are unable to get him in, or do not call in an antibiotic, he will either call us back to see him or take him to Urgent Care.

## 2024-12-23 NOTE — TELEPHONE ENCOUNTER
Patient is active with wound care.  I suggest involving them in this decision.      I do recommend being seen as this is a location that can spread rapidly.  If wound care unable to assess then either PCP office/urgent care.

## 2024-12-23 NOTE — ED PROVIDER NOTES
Department of Emergency Medicine   ED  Provider Note  Admit Date/RoomTime: 12/23/2024  3:52 PM  ED Room: 15/15              [facility]     Mason Gudino is a 79 y.o. male with a PMHx significant for  CKD, CAD, HTN, HLD, s/p valve replacement on anticoagulation, DM (insulin)  who presents for evaluation of right groin pain, concern for abscess, beginning prior to arrival.  The complaint has been persistent, moderate in severity, and worsened by nothing.   The patient states that 2 days ago he began develop discomfort to his right groin.  He notes that today when at home nurse came to visit and evaluate him they noticed draining coming from the area.  Denies any fevers, chills, chest pain, shortness of breath, nausea, vomiting, diarrhea.  Son at bedside notes the patient is a newly diagnosed diabetic, who is on insulin with Dexacom, with sugars running well.     Review of Systems   Constitutional:  Negative for chills and fever.   HENT:  Negative for ear pain, sinus pressure and sore throat.    Eyes:  Negative for pain, discharge and redness.   Respiratory:  Negative for cough, shortness of breath and wheezing.    Cardiovascular:  Negative for chest pain.   Gastrointestinal:  Negative for abdominal pain, diarrhea, nausea and vomiting.   Genitourinary:  Negative for dysuria and frequency.   Musculoskeletal:  Negative for arthralgias and back pain.   Skin:  Positive for wound. Negative for rash.   Neurological:  Negative for weakness and headaches.   Hematological:  Negative for adenopathy.   All other systems reviewed and are negative.       Physical Exam  Vitals and nursing note reviewed.   Constitutional:       General: He is not in acute distress.     Appearance: Normal appearance. He is well-developed. He is not ill-appearing.   HENT:      Head: Normocephalic and atraumatic.      Right Ear: External ear normal.      Left Ear: External ear normal.   Eyes:      General:         Right eye: No discharge.         Left

## 2024-12-23 NOTE — TELEPHONE ENCOUNTER
Received a call from the [facility] nurse; the family noticed a boil in his groin. The nurse did look at it and stated that it appears to be draining pus, red and swollen.     They are asking that an antibiotic get called into the pharmacy - [facility] in [place], [place]

## 2024-12-23 NOTE — DISCHARGE INSTRUCTIONS
Apply warm compresses, keep area clean.  Take antibiotics as prescribed.  If symptoms worsen or concern arises please return for evaluation.

## 2024-12-26 DIAGNOSIS — Z96.652 S/P TOTAL KNEE REPLACEMENT, LEFT: Primary | ICD-10-CM

## 2024-12-30 RX ORDER — FOLIC ACID 1 MG/1
1000 TABLET ORAL DAILY
Qty: 30 TABLET | Refills: 0 | OUTPATIENT
Start: 2024-12-30

## 2024-12-31 ENCOUNTER — HOSPITAL ENCOUNTER (OUTPATIENT)
Dept: WOUND CARE | Age: 79
Discharge: HOME | End: 2024-12-31
Payer: MEDICARE

## 2024-12-31 VITALS — RESPIRATION RATE: 16 BRPM | HEART RATE: 69 BPM | TEMPERATURE: 97.6 F

## 2024-12-31 DIAGNOSIS — L89.152 PRESSURE ULCER OF COCCYGEAL REGION, STAGE 2 (HCC): Primary | ICD-10-CM

## 2024-12-31 PROCEDURE — 11042 DBRDMT SUBQ TIS 1ST 20SQCM/<: CPT | Performed by: STUDENT IN AN ORGANIZED HEALTH CARE EDUCATION/TRAINING PROGRAM

## 2024-12-31 PROCEDURE — 11042 DBRDMT SUBQ TIS 1ST 20SQCM/<: CPT

## 2024-12-31 RX ORDER — NEOMYCIN/BACITRACIN/POLYMYXINB 3.5-400-5K
OINTMENT (GRAM) TOPICAL ONCE
OUTPATIENT
Start: 2024-12-31 | End: 2024-12-31

## 2024-12-31 RX ORDER — CLOBETASOL PROPIONATE 0.5 MG/G
OINTMENT TOPICAL ONCE
OUTPATIENT
Start: 2024-12-31 | End: 2024-12-31

## 2024-12-31 RX ORDER — SODIUM CHLOR/HYPOCHLOROUS ACID 0.033 %
SOLUTION, IRRIGATION IRRIGATION ONCE
OUTPATIENT
Start: 2024-12-31 | End: 2024-12-31

## 2024-12-31 RX ORDER — LIDOCAINE 40 MG/G
CREAM TOPICAL ONCE
OUTPATIENT
Start: 2024-12-31 | End: 2024-12-31

## 2024-12-31 RX ORDER — LIDOCAINE 50 MG/G
OINTMENT TOPICAL ONCE
OUTPATIENT
Start: 2024-12-31 | End: 2024-12-31

## 2024-12-31 RX ORDER — LIDOCAINE HYDROCHLORIDE 20 MG/ML
JELLY TOPICAL ONCE
OUTPATIENT
Start: 2024-12-31 | End: 2024-12-31

## 2024-12-31 RX ORDER — MUPIROCIN 20 MG/G
OINTMENT TOPICAL ONCE
OUTPATIENT
Start: 2024-12-31 | End: 2024-12-31

## 2024-12-31 RX ORDER — GENTAMICIN SULFATE 1 MG/G
OINTMENT TOPICAL ONCE
OUTPATIENT
Start: 2024-12-31 | End: 2024-12-31

## 2024-12-31 RX ORDER — BETAMETHASONE DIPROPIONATE 0.5 MG/G
CREAM TOPICAL ONCE
OUTPATIENT
Start: 2024-12-31 | End: 2024-12-31

## 2024-12-31 RX ORDER — BACITRACIN ZINC 500 [USP'U]/G
OINTMENT TOPICAL ONCE
OUTPATIENT
Start: 2024-12-31 | End: 2024-12-31

## 2024-12-31 RX ORDER — SILVER SULFADIAZINE 10 MG/G
CREAM TOPICAL ONCE
OUTPATIENT
Start: 2024-12-31 | End: 2024-12-31

## 2024-12-31 RX ORDER — BACITRACIN ZINC AND POLYMYXIN B SULFATE 500; 1000 [USP'U]/G; [USP'U]/G
OINTMENT TOPICAL ONCE
OUTPATIENT
Start: 2024-12-31 | End: 2024-12-31

## 2024-12-31 RX ORDER — TRIAMCINOLONE ACETONIDE 1 MG/G
OINTMENT TOPICAL ONCE
OUTPATIENT
Start: 2024-12-31 | End: 2024-12-31

## 2024-12-31 RX ORDER — LIDOCAINE HYDROCHLORIDE 40 MG/ML
SOLUTION TOPICAL ONCE
Status: COMPLETED | OUTPATIENT
Start: 2024-12-31 | End: 2024-12-31

## 2024-12-31 RX ORDER — LIDOCAINE HYDROCHLORIDE 40 MG/ML
SOLUTION TOPICAL ONCE
OUTPATIENT
Start: 2024-12-31 | End: 2024-12-31

## 2024-12-31 RX ADMIN — LIDOCAINE HYDROCHLORIDE 10 ML: 40 SOLUTION TOPICAL at 10:10

## 2024-12-31 NOTE — PROGRESS NOTES
Serosanguinous;Serous 12/31/24 1007   Odor None 12/31/24 1007   Alison-wound Assessment Maceration 12/31/24 1007   Margins Attached edges 12/17/24 1021   Wound Thickness Description not for Pressure Injury Full thickness 12/17/24 1021   Number of days: 14     Incision 10/29/24 Knee Left (Active)   Number of days: 63       Procedure Note  Indications:  Based on my examination of this patient's wound(s)/ulcer(s) today, debridement is required to promote healing and evaluate the wound base.    Performed by: Alphonse Gomez MD    Consent obtained:  Yes    Time out taken:  Yes    Pain Control: Anesthetic  Anesthetic: 4% Lidocaine Liquid Topical     Debridement:Excisional Debridement    Using curette the wound(s)/ulcer(s) was/were sharply debrided down through and including the removal of subcutaneous tissue.        Devitalized Tissue Debrided:  fibrin, biofilm, and slough to stimulate bleeding to promote healing, post debridement good bleeding base and wound edges noted    Wound/Ulcer #: 1    Percent of Wound/Ulcer Debrided: 100% of wound within blocked area    Total Surface Area Debrided:  0.25 sq cm     Estimated Blood Loss:  minimal  Hemostasis Achieved:  not needed    Procedural Pain:  3  / 10   Post Procedural Pain:  2 / 10     Response to treatment:  Well tolerated by patient.     A culture was not done.      Plan:     Pt is not a smoker   - Discussed relationship of smoking and negative affects on wound healing   - Emphasized importance of tobacco avoidace/cessation    In my professional opinion and based off the information that is available at this time this patient has appropriate indication for HBO Therapy: No    Treatment Note please see attached Discharge Instructions    Written patient dismissal instructions given to patient and signed by patient or POA.         Discharge Instructions         Visit Discharge/Physician Orders     Discharge condition: Stable     Assessment of pain at discharge: yes     Anesthetic

## 2025-01-02 NOTE — DISCHARGE INSTRUCTIONS
Visit Discharge/Physician Orders     Discharge condition: Stable     Assessment of pain at discharge: yes     Anesthetic used: 4% lidocaine external     Discharge to: Home     Left via:Private automobile     Accompanied by: accompanied by family     ECF/HHA: [facility] *NEW ORDERS*     Dressing Orders:      To coccyx wound, cleanse with normal saline solution and apply alginate and cover with a silicone bordered (Mepilex or Zetuvit brand work best) dressing. Change daily.     Treatment Orders:     FOLLOW NUTRITIOUS DIET. CHOOSE FOODS HIGH IN PROTEIN -CHICKEN- FISH-AND EGGS,  CHOOSE FOODS HIGH IN VITAMIN C.   MULTIVITAMIN DAILY.       Keep pressure off wound site as much as possible by turning and repositioning to relieve pressure sites     [facility] followup visit ________1 week_____________________  (Please note your next appointment above and if you are unable to keep, kindly give a 24 hour notice. Thank you.)     Physician signature:__________________________        If you experience any of the following, please call the Wound Care Center during business hours:     * Increase in Pain  * Temperature over 101  * Increase in drainage from your wound  * Drainage with a foul odor  * Bleeding  * Increase in swelling  * Need for compression bandage changes due to slippage, breakthrough drainage.     If you need medical attention outside of the business hours of the Wound Care Centers please contact your PCP or go to the nearest emergency room.

## 2025-01-07 ENCOUNTER — HOSPITAL ENCOUNTER (OUTPATIENT)
Dept: WOUND CARE | Age: 80
Discharge: HOME | End: 2025-01-07
Payer: MEDICARE

## 2025-01-07 VITALS
HEIGHT: 71 IN | HEART RATE: 80 BPM | TEMPERATURE: 98.3 F | WEIGHT: 246 LBS | BODY MASS INDEX: 34.44 KG/M2 | RESPIRATION RATE: 18 BRPM | SYSTOLIC BLOOD PRESSURE: 130 MMHG | DIASTOLIC BLOOD PRESSURE: 62 MMHG

## 2025-01-07 DIAGNOSIS — L89.152 PRESSURE ULCER OF COCCYGEAL REGION, STAGE 2 (HCC): Primary | ICD-10-CM

## 2025-01-07 PROCEDURE — 11042 DBRDMT SUBQ TIS 1ST 20SQCM/<: CPT

## 2025-01-07 PROCEDURE — 11042 DBRDMT SUBQ TIS 1ST 20SQCM/<: CPT | Performed by: STUDENT IN AN ORGANIZED HEALTH CARE EDUCATION/TRAINING PROGRAM

## 2025-01-07 RX ORDER — LIDOCAINE HYDROCHLORIDE 20 MG/ML
JELLY TOPICAL ONCE
OUTPATIENT
Start: 2025-01-07 | End: 2025-01-07

## 2025-01-07 RX ORDER — NEOMYCIN/BACITRACIN/POLYMYXINB 3.5-400-5K
OINTMENT (GRAM) TOPICAL ONCE
OUTPATIENT
Start: 2025-01-07 | End: 2025-01-07

## 2025-01-07 RX ORDER — SODIUM CHLOR/HYPOCHLOROUS ACID 0.033 %
SOLUTION, IRRIGATION IRRIGATION ONCE
OUTPATIENT
Start: 2025-01-07 | End: 2025-01-07

## 2025-01-07 RX ORDER — LIDOCAINE 40 MG/G
CREAM TOPICAL ONCE
OUTPATIENT
Start: 2025-01-07 | End: 2025-01-07

## 2025-01-07 RX ORDER — MUPIROCIN 20 MG/G
OINTMENT TOPICAL ONCE
OUTPATIENT
Start: 2025-01-07 | End: 2025-01-07

## 2025-01-07 RX ORDER — LIDOCAINE HYDROCHLORIDE 40 MG/ML
SOLUTION TOPICAL ONCE
OUTPATIENT
Start: 2025-01-07 | End: 2025-01-07

## 2025-01-07 RX ORDER — LIDOCAINE 50 MG/G
OINTMENT TOPICAL ONCE
OUTPATIENT
Start: 2025-01-07 | End: 2025-01-07

## 2025-01-07 RX ORDER — SILVER SULFADIAZINE 10 MG/G
CREAM TOPICAL ONCE
OUTPATIENT
Start: 2025-01-07 | End: 2025-01-07

## 2025-01-07 RX ORDER — BACITRACIN ZINC AND POLYMYXIN B SULFATE 500; 1000 [USP'U]/G; [USP'U]/G
OINTMENT TOPICAL ONCE
OUTPATIENT
Start: 2025-01-07 | End: 2025-01-07

## 2025-01-07 RX ORDER — BETAMETHASONE DIPROPIONATE 0.5 MG/G
CREAM TOPICAL ONCE
OUTPATIENT
Start: 2025-01-07 | End: 2025-01-07

## 2025-01-07 RX ORDER — BACITRACIN ZINC 500 [USP'U]/G
OINTMENT TOPICAL ONCE
OUTPATIENT
Start: 2025-01-07 | End: 2025-01-07

## 2025-01-07 RX ORDER — TRIAMCINOLONE ACETONIDE 1 MG/G
OINTMENT TOPICAL ONCE
OUTPATIENT
Start: 2025-01-07 | End: 2025-01-07

## 2025-01-07 RX ORDER — GENTAMICIN SULFATE 1 MG/G
OINTMENT TOPICAL ONCE
OUTPATIENT
Start: 2025-01-07 | End: 2025-01-07

## 2025-01-07 RX ORDER — CLOBETASOL PROPIONATE 0.5 MG/G
OINTMENT TOPICAL ONCE
OUTPATIENT
Start: 2025-01-07 | End: 2025-01-07

## 2025-01-07 NOTE — PROGRESS NOTES
Discharge/Physician Orders     Discharge condition: Stable     Assessment of pain at discharge: yes     Anesthetic used: 4% lidocaine external     Discharge to: Home     Left via:Private automobile     Accompanied by: accompanied by family     [facility]     Dressing Orders:      To coccyx wound, cleanse with normal saline solution and apply alginate and cover with a silicone bordered (Mepilex or Zetuvit brand work best) dressing. Change daily.     Treatment Orders:     FOLLOW NUTRITIOUS DIET. CHOOSE FOODS HIGH IN PROTEIN -CHICKEN- FISH-AND EGGS,  CHOOSE FOODS HIGH IN VITAMIN C.   MULTIVITAMIN DAILY.       Keep pressure off wound site as much as possible by turning and repositioning to relieve pressure sites     [facility] followup visit ________1 week_____________________  (Please note your next appointment above and if you are unable to keep, kindly give a 24 hour notice. Thank you.)     Physician signature:__________________________        If you experience any of the following, please call the Wound Care Center during business hours:     * Increase in Pain  * Temperature over 101  * Increase in drainage from your wound  * Drainage with a foul odor  * Bleeding  * Increase in swelling  * Need for compression bandage changes due to slippage, breakthrough drainage.     If you need medical attention outside of the business hours of the Wound Care Centers please contact your PCP or go to the nearest emergency room.        Electronically signed by Alphonse Gomez MD on 1/7/2025 at 10:51 AM

## 2025-01-08 NOTE — DISCHARGE INSTRUCTIONS
Visit Discharge/Physician Orders     Discharge condition: Stable     Assessment of pain at discharge:      Anesthetic used:      Discharge to: Home     Left via:Private automobile     Accompanied by: self     ECF/HHA: [facility]     Dressing Orders:      To coccyx wound- healed     Treatment Orders:     FOLLOW NUTRITIOUS DIET. CHOOSE FOODS HIGH IN PROTEIN -CHICKEN- FISH-AND EGGS,  CHOOSE FOODS HIGH IN VITAMIN C.   MULTIVITAMIN DAILY.       Keep pressure off wound site as much as possible by turning and repositioning to relieve pressure sites     [facility] followup visit ______as needed__________________  (Please note your next appointment above and if you are unable to keep, kindly give a 24 hour notice. Thank you.)     Physician signature:__________________________        If you experience any of the following, please call the Wound Care Center during business hours:     * Increase in Pain  * Temperature over 101  * Increase in drainage from your wound  * Drainage with a foul odor  * Bleeding  * Increase in swelling  * Need for compression bandage changes due to slippage, breakthrough drainage.     If you need medical attention outside of the business hours of the Wound Care Centers please contact your PCP or go to the nearest emergency room.

## 2025-01-13 ASSESSMENT — PATIENT HEALTH QUESTIONNAIRE - PHQ9
1. LITTLE INTEREST OR PLEASURE IN DOING THINGS: NOT AT ALL
SUM OF ALL RESPONSES TO PHQ QUESTIONS 1-9: 0
1. LITTLE INTEREST OR PLEASURE IN DOING THINGS: NOT AT ALL
SUM OF ALL RESPONSES TO PHQ QUESTIONS 1-9: 0
SUM OF ALL RESPONSES TO PHQ9 QUESTIONS 1 & 2: 0
2. FEELING DOWN, DEPRESSED OR HOPELESS: NOT AT ALL
2. FEELING DOWN, DEPRESSED OR HOPELESS: NOT AT ALL
SUM OF ALL RESPONSES TO PHQ9 QUESTIONS 1 & 2: 0

## 2025-01-14 ENCOUNTER — HOSPITAL ENCOUNTER (OUTPATIENT)
Dept: WOUND CARE | Age: 80
Discharge: HOME | End: 2025-01-14
Payer: MEDICARE

## 2025-01-14 VITALS
TEMPERATURE: 97.8 F | DIASTOLIC BLOOD PRESSURE: 64 MMHG | HEART RATE: 55 BPM | RESPIRATION RATE: 18 BRPM | WEIGHT: 246 LBS | BODY MASS INDEX: 34.44 KG/M2 | SYSTOLIC BLOOD PRESSURE: 139 MMHG | HEIGHT: 71 IN

## 2025-01-14 DIAGNOSIS — L89.152 PRESSURE ULCER OF COCCYGEAL REGION, STAGE 2 (HCC): Primary | ICD-10-CM

## 2025-01-14 PROCEDURE — 99212 OFFICE O/P EST SF 10 MIN: CPT

## 2025-01-14 PROCEDURE — 99212 OFFICE O/P EST SF 10 MIN: CPT | Performed by: STUDENT IN AN ORGANIZED HEALTH CARE EDUCATION/TRAINING PROGRAM

## 2025-01-14 RX ORDER — CLOBETASOL PROPIONATE 0.5 MG/G
OINTMENT TOPICAL ONCE
Status: CANCELLED | OUTPATIENT
Start: 2025-01-14 | End: 2025-01-14

## 2025-01-14 RX ORDER — LIDOCAINE HYDROCHLORIDE 20 MG/ML
JELLY TOPICAL ONCE
Status: CANCELLED | OUTPATIENT
Start: 2025-01-14 | End: 2025-01-14

## 2025-01-14 RX ORDER — GENTAMICIN SULFATE 1 MG/G
OINTMENT TOPICAL ONCE
Status: CANCELLED | OUTPATIENT
Start: 2025-01-14 | End: 2025-01-14

## 2025-01-14 RX ORDER — BETAMETHASONE DIPROPIONATE 0.5 MG/G
CREAM TOPICAL ONCE
Status: CANCELLED | OUTPATIENT
Start: 2025-01-14 | End: 2025-01-14

## 2025-01-14 RX ORDER — BACITRACIN ZINC AND POLYMYXIN B SULFATE 500; 1000 [USP'U]/G; [USP'U]/G
OINTMENT TOPICAL ONCE
Status: CANCELLED | OUTPATIENT
Start: 2025-01-14 | End: 2025-01-14

## 2025-01-14 RX ORDER — SILVER SULFADIAZINE 10 MG/G
CREAM TOPICAL ONCE
Status: CANCELLED | OUTPATIENT
Start: 2025-01-14 | End: 2025-01-14

## 2025-01-14 RX ORDER — LIDOCAINE 40 MG/G
CREAM TOPICAL ONCE
Status: CANCELLED | OUTPATIENT
Start: 2025-01-14 | End: 2025-01-14

## 2025-01-14 RX ORDER — LIDOCAINE HYDROCHLORIDE 40 MG/ML
SOLUTION TOPICAL ONCE
Status: CANCELLED | OUTPATIENT
Start: 2025-01-14 | End: 2025-01-14

## 2025-01-14 RX ORDER — MUPIROCIN 20 MG/G
OINTMENT TOPICAL ONCE
Status: CANCELLED | OUTPATIENT
Start: 2025-01-14 | End: 2025-01-14

## 2025-01-14 RX ORDER — BACITRACIN ZINC 500 [USP'U]/G
OINTMENT TOPICAL ONCE
Status: CANCELLED | OUTPATIENT
Start: 2025-01-14 | End: 2025-01-14

## 2025-01-14 RX ORDER — NEOMYCIN/BACITRACIN/POLYMYXINB 3.5-400-5K
OINTMENT (GRAM) TOPICAL ONCE
Status: CANCELLED | OUTPATIENT
Start: 2025-01-14 | End: 2025-01-14

## 2025-01-14 RX ORDER — TRIAMCINOLONE ACETONIDE 1 MG/G
OINTMENT TOPICAL ONCE
Status: CANCELLED | OUTPATIENT
Start: 2025-01-14 | End: 2025-01-14

## 2025-01-14 RX ORDER — LIDOCAINE HYDROCHLORIDE 40 MG/ML
SOLUTION TOPICAL ONCE
Status: DISCONTINUED | OUTPATIENT
Start: 2025-01-14 | End: 2025-01-14

## 2025-01-14 RX ORDER — SODIUM CHLOR/HYPOCHLOROUS ACID 0.033 %
SOLUTION, IRRIGATION IRRIGATION ONCE
Status: CANCELLED | OUTPATIENT
Start: 2025-01-14 | End: 2025-01-14

## 2025-01-14 RX ORDER — LIDOCAINE 50 MG/G
OINTMENT TOPICAL ONCE
Status: CANCELLED | OUTPATIENT
Start: 2025-01-14 | End: 2025-01-14

## 2025-01-14 NOTE — PROGRESS NOTES
FOR CHEST PAIN. MAX OF 3 TABLETS IN 15 MINUTES. CALL 911 IF PAIN  PERSISTS. 100 tablet 3    magnesium oxide (MAG-OX) 400 (240 Mg) MG tablet Take 1 tablet by mouth daily 90 tablet 0    Continuous Glucose Sensor (DEXCOM G7 SENSOR) MISC 1 each by Does not apply route every 10 days 9 each 3    Insulin Pen Needle 32G X 4 MM MISC 1 each by Does not apply route daily 100 each 3    magnesium oxide (MAG-OX) 400 MG tablet Take 1 tablet by mouth daily 90 tablet 1    glucose 4 g chewable tablet Take 4 tablets by mouth as needed for Low blood sugar      FreeStyle Lancets MISC CHECK BLOOD SUGAR ONCE DAILY 100 each 3    acetaminophen (TYLENOL) 500 MG tablet Take 2 tablets by mouth every 8 hours as needed      tamsulosin (FLOMAX) 0.4 MG capsule TAKE 1 CAPSULE BY MOUTH DAILY (Patient taking differently: Take 1 capsule by mouth every morning) 90 capsule 3    Compression Stockings MISC by Does not apply route Dispense one pair of compression stockings 20/30 mmHg    Ankle size 25cm  Calf size 43cm 1 each 0    blood glucose monitor strips Test daily times a day & as needed for symptoms of irregular blood glucose. Dispense sufficient amount for indicated testing frequency plus additional to accommodate PRN testing needs. 100 strip 5    glucose monitoring kit 1 kit by Does not apply route daily 1 kit 0     No current facility-administered medications on file prior to encounter.       REVIEW OF SYSTEMS   ROS : All others Negative if blank [], Positive if [x]  General Urinary   [] Fevers [] Hematuria   [] Chills [] Dysuria   [] Weight Loss Neurolgoic   Skin [] Stroke/TIA   [x] Tissue Loss [] Focal weakness   Eyes [] Slurred Speech   [] Wears Glasses/Contacts ENT   [] Vision Changes [] Difficulty swallowing   Respiratory Endocrine    [] Shortness of breath [] Increased Thirst   Cardiovascular Gastrointestinal   [] Chest Pain [] Abdominal Pain   [] Shortness of breath with exertion [] Melena    [] Hematochezia     Objective:    /64

## 2025-01-14 NOTE — PLAN OF CARE
Problem: Cognitive:  Goal: Knowledge of wound care  Description: Knowledge of wound care  Outcome: Completed  Goal: Understands risk factors for wounds  Description: Understands risk factors for wounds  Outcome: Completed     Problem: Wound:  Goal: Will show signs of wound healing; wound closure and no evidence of infection  Description: Will show signs of wound healing; wound closure and no evidence of infection  Outcome: Completed     Problem: Pressure Ulcer:  Goal: Signs of wound healing will improve  Description: Signs of wound healing will improve  Outcome: Completed  Goal: Absence of new pressure ulcer  Description: Absence of new pressure ulcer  Outcome: Completed  Goal: Will show no infection signs and symptoms  Description: Will show no infection signs and symptoms  Outcome: Completed

## 2025-01-16 ENCOUNTER — OFFICE VISIT (OUTPATIENT)
Dept: FAMILY MEDICINE CLINIC | Age: 80
End: 2025-01-16
Payer: MEDICARE

## 2025-01-16 VITALS
HEART RATE: 50 BPM | WEIGHT: 240 LBS | RESPIRATION RATE: 16 BRPM | SYSTOLIC BLOOD PRESSURE: 135 MMHG | DIASTOLIC BLOOD PRESSURE: 65 MMHG | BODY MASS INDEX: 33.6 KG/M2 | OXYGEN SATURATION: 99 % | TEMPERATURE: 98.6 F | HEIGHT: 71 IN

## 2025-01-16 DIAGNOSIS — I50.32 CHRONIC DIASTOLIC HEART FAILURE (HCC): ICD-10-CM

## 2025-01-16 DIAGNOSIS — Z79.4 TYPE 2 DIABETES MELLITUS WITH OTHER SPECIFIED COMPLICATION, WITH LONG-TERM CURRENT USE OF INSULIN (HCC): Primary | ICD-10-CM

## 2025-01-16 DIAGNOSIS — I48.19 PERSISTENT ATRIAL FIBRILLATION (HCC): ICD-10-CM

## 2025-01-16 DIAGNOSIS — J44.9 CHRONIC OBSTRUCTIVE PULMONARY DISEASE, UNSPECIFIED COPD TYPE (HCC): ICD-10-CM

## 2025-01-16 DIAGNOSIS — N18.31 STAGE 3A CHRONIC KIDNEY DISEASE (HCC): ICD-10-CM

## 2025-01-16 DIAGNOSIS — E11.69 TYPE 2 DIABETES MELLITUS WITH OTHER SPECIFIED COMPLICATION, WITH LONG-TERM CURRENT USE OF INSULIN (HCC): Primary | ICD-10-CM

## 2025-01-16 PROBLEM — R26.2 INABILITY TO AMBULATE DUE TO LEFT KNEE: Status: RESOLVED | Noted: 2024-10-31 | Resolved: 2025-01-16

## 2025-01-16 PROBLEM — E66.01 MORBID (SEVERE) OBESITY DUE TO EXCESS CALORIES: Status: RESOLVED | Noted: 2024-12-06 | Resolved: 2025-01-16

## 2025-01-16 PROBLEM — Z96.652 STATUS POST LEFT KNEE REPLACEMENT: Status: RESOLVED | Noted: 2024-10-29 | Resolved: 2025-01-16

## 2025-01-16 PROBLEM — M79.89 LEG SWELLING: Status: RESOLVED | Noted: 2024-11-04 | Resolved: 2025-01-16

## 2025-01-16 PROBLEM — E11.22 TYPE 2 DIABETES MELLITUS WITH CHRONIC KIDNEY DISEASE, WITH LONG-TERM CURRENT USE OF INSULIN (HCC): Status: ACTIVE | Noted: 2023-11-02

## 2025-01-16 PROBLEM — E11.65 UNCONTROLLED TYPE 2 DIABETES MELLITUS WITH HYPERGLYCEMIA (HCC): Status: RESOLVED | Noted: 2024-11-01 | Resolved: 2025-01-16

## 2025-01-16 PROBLEM — M17.12 PRIMARY OSTEOARTHRITIS OF LEFT KNEE: Status: RESOLVED | Noted: 2022-10-26 | Resolved: 2025-01-16

## 2025-01-16 PROBLEM — G47.30 SLEEP APNEA: Status: RESOLVED | Noted: 2017-10-05 | Resolved: 2025-01-16

## 2025-01-16 PROBLEM — Z98.890 S/P HEART VALVE REPAIR: Status: RESOLVED | Noted: 2024-05-15 | Resolved: 2025-01-16

## 2025-01-16 PROBLEM — R26.2 DIFFICULTY IN WALKING: Status: RESOLVED | Noted: 2024-11-01 | Resolved: 2025-01-16

## 2025-01-16 PROBLEM — M25.469 KNEE SWELLING: Status: RESOLVED | Noted: 2024-11-01 | Resolved: 2025-01-16

## 2025-01-16 PROBLEM — N28.1 RENAL CYST: Status: RESOLVED | Noted: 2023-08-28 | Resolved: 2025-01-16

## 2025-01-16 PROBLEM — F10.20 ALCOHOLISM (HCC): Status: RESOLVED | Noted: 2022-08-03 | Resolved: 2025-01-16

## 2025-01-16 PROCEDURE — 3023F SPIROM DOC REV: CPT | Performed by: FAMILY MEDICINE

## 2025-01-16 PROCEDURE — 3078F DIAST BP <80 MM HG: CPT | Performed by: FAMILY MEDICINE

## 2025-01-16 PROCEDURE — 1123F ACP DISCUSS/DSCN MKR DOCD: CPT | Performed by: FAMILY MEDICINE

## 2025-01-16 PROCEDURE — G8427 DOCREV CUR MEDS BY ELIG CLIN: HCPCS | Performed by: FAMILY MEDICINE

## 2025-01-16 PROCEDURE — 1036F TOBACCO NON-USER: CPT | Performed by: FAMILY MEDICINE

## 2025-01-16 PROCEDURE — 3075F SYST BP GE 130 - 139MM HG: CPT | Performed by: FAMILY MEDICINE

## 2025-01-16 PROCEDURE — 99214 OFFICE O/P EST MOD 30 MIN: CPT | Performed by: FAMILY MEDICINE

## 2025-01-16 PROCEDURE — 1159F MED LIST DOCD IN RCRD: CPT | Performed by: FAMILY MEDICINE

## 2025-01-16 PROCEDURE — G8417 CALC BMI ABV UP PARAM F/U: HCPCS | Performed by: FAMILY MEDICINE

## 2025-01-16 RX ORDER — AMIODARONE HYDROCHLORIDE 200 MG/1
200 TABLET ORAL EVERY MORNING
Qty: 90 TABLET | Refills: 3 | Status: SHIPPED | OUTPATIENT
Start: 2025-01-16

## 2025-01-16 RX ORDER — TAMSULOSIN HYDROCHLORIDE 0.4 MG/1
0.4 CAPSULE ORAL EVERY MORNING
Qty: 90 CAPSULE | Refills: 3 | Status: SHIPPED | OUTPATIENT
Start: 2025-01-16

## 2025-01-16 ASSESSMENT — ENCOUNTER SYMPTOMS
ABDOMINAL PAIN: 0
SHORTNESS OF BREATH: 0
CHEST TIGHTNESS: 0

## 2025-01-16 NOTE — PROGRESS NOTES
[facility]  Department of Family Medicine  Family Medicine Residency Program      Patient:  Mason Gudino 79 y.o. male  Date of Service: 1/16/25      Chief complaint:   Chief Complaint   Patient presents with    Diabetes     Not due for A1C until next month. - wants to know what time of day he should take the insulin     Hypertension       Assessment and Plan   1. Type 2 diabetes mellitus with other specified complication, with long-term current use of insulin (HCC)  Estimated A1c under dramatically better control.  Not having hypoglycemia.  No changes    2. Chronic diastolic heart failure (HCC)  Fluid balance okay today.  Breathing symptoms is okay.    3. Chronic obstructive pulmonary disease, unspecified COPD type (HCC)  Stable breathing symptoms no changes    4. Persistent atrial fibrillation (HCC)  Rate controlled tolerating anticoagulation    5. Stage 3a chronic kidney disease (HCC)  Will follow labs no complaints today      Return to Office: Return in about 3 months (around 4/16/2025) for Diabetes, prostate.    History ofPresent Illness   The patient is a 79 y.o. male  presented to the clinic with complaints as above.    DM - having some highs but usually this is related to the diet.  Overall he has been taking lantus daily.  Reviewed GLC logs - from dexcom - overall great control estimating A1C of 7 and only one low and several highs.     Htn - no sx to report.  Tolerating meds.     HFpEF - stable breathing - fluid controlled, following cardiology    COPD - breathing stable, improved after TAVR - no concerns today.     Afib - doing well - tolerating amio + eliquis, no bleeding     Alcoholism - not drinking     CKDIIIa - stable     Urine flow - needs flomax refill.       Review of Systems:   Review of Systems   Respiratory:  Negative for chest tightness and shortness of breath.    Cardiovascular:  Negative for chest pain and palpitations.   Gastrointestinal:  Negative for abdominal

## 2025-01-28 ENCOUNTER — TELEPHONE (OUTPATIENT)
Dept: CARDIAC REHAB | Age: 80
End: 2025-01-28

## 2025-01-30 ENCOUNTER — PATIENT MESSAGE (OUTPATIENT)
Dept: FAMILY MEDICINE CLINIC | Age: 80
End: 2025-01-30

## 2025-01-30 ENCOUNTER — OFFICE VISIT (OUTPATIENT)
Dept: FAMILY MEDICINE CLINIC | Age: 80
End: 2025-01-30

## 2025-01-30 VITALS
WEIGHT: 247 LBS | HEIGHT: 71 IN | TEMPERATURE: 98.9 F | BODY MASS INDEX: 34.58 KG/M2 | OXYGEN SATURATION: 96 % | SYSTOLIC BLOOD PRESSURE: 122 MMHG | RESPIRATION RATE: 16 BRPM | HEART RATE: 61 BPM | DIASTOLIC BLOOD PRESSURE: 62 MMHG

## 2025-01-30 DIAGNOSIS — U07.1 COVID: ICD-10-CM

## 2025-01-30 DIAGNOSIS — R05.9 COUGH, UNSPECIFIED TYPE: Primary | ICD-10-CM

## 2025-01-30 DIAGNOSIS — J39.8 CONGESTION OF UPPER RESPIRATORY TRACT: ICD-10-CM

## 2025-01-30 LAB
INFLUENZA A ANTIBODY: NEGATIVE
INFLUENZA B ANTIBODY: NEGATIVE
Lab: ABNORMAL
PERFORMING INSTRUMENT: ABNORMAL
QC PASS/FAIL: ABNORMAL
SARS-COV-2, POC: DETECTED

## 2025-01-30 RX ORDER — BENZONATATE 100 MG/1
100 CAPSULE ORAL 3 TIMES DAILY PRN
Qty: 30 CAPSULE | Refills: 0 | Status: SHIPPED | OUTPATIENT
Start: 2025-01-30 | End: 2025-02-09

## 2025-01-30 RX ORDER — ALBUTEROL SULFATE 90 UG/1
2 INHALANT RESPIRATORY (INHALATION) 2 TIMES DAILY
COMMUNITY
End: 2025-01-30 | Stop reason: SDUPTHER

## 2025-01-30 RX ORDER — FLUTICASONE PROPIONATE 50 MCG
2 SPRAY, SUSPENSION (ML) NASAL DAILY
Qty: 16 G | Refills: 0 | Status: SHIPPED | OUTPATIENT
Start: 2025-01-30

## 2025-01-30 RX ORDER — ASCORBIC ACID 500 MG
500 TABLET ORAL DAILY
Qty: 30 TABLET | Refills: 3 | Status: SHIPPED | OUTPATIENT
Start: 2025-01-30

## 2025-01-30 RX ORDER — ALBUTEROL SULFATE 90 UG/1
2 INHALANT RESPIRATORY (INHALATION) 2 TIMES DAILY
Qty: 18 G | Refills: 0 | Status: SHIPPED | OUTPATIENT
Start: 2025-01-30

## 2025-01-30 NOTE — PROGRESS NOTES
[facility]  Precepting Note    Subjective:  Cough, congestion body aches     ROS otherwise negative     Past medical, surgical, family and social history were reviewed, non-contributory, and unchanged unless otherwise stated.    Objective:    /62   Pulse 61   Temp 98.9 °F (37.2 °C) (Temporal)   Resp 16   Ht 1.803 m (5' 11\")   Wt 112 kg (247 lb)   SpO2 96%   BMI 34.45 kg/m²     Exam is as noted by resident with the following changes, additions or corrections:    Assessment/Plan:  Covid positive   Paxlovid; SE/med interactions discussed   Cont Symbicort, albuterol   Follow up one week      Attending Physician Statement  I have reviewed the chart, including any radiology or labs. I have discussed the case, including pertinent history and exam findings with the resident.  I agree with the assessment, plan and orders as documented by the resident.  Please refer to the resident note for additional information.      Electronically signed by Xiao Gan MD on 1/30/2025 at 3:01 PM
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
## 2025-01-31 NOTE — TELEPHONE ENCOUNTER
Pharmacy called to let us know that family has chosen to fill and begin Mason on Paxlovid and will update us if any complications arise.

## 2025-02-03 NOTE — TELEPHONE ENCOUNTER
Spoke with Gianna this morning. She states patient started paxlovid on 2/1/25. Lipitor was stopped on 1/27/25. Gianna states no adverse reactions at this time. Instructed that patient stop taking paxlovid until further instructions are given.

## 2025-02-03 NOTE — TELEPHONE ENCOUNTER
Called and spoke to daughter Gianna.  She reports they discussed the \"lower\" renal dose of the paxlovid however, I called the pharmacy to confirmed he was dispensed the nirmatrelvir 300mg (take [id number])BID x5d and ritonavir 100mg BID x5d.  This is not the appropriate renal dose he needs to cut the nirmatrelvir dose in half by only taking a single capsule.  I have informed the family.    Cough still present - has improved substantially after even one day.  He is about 2.5d in right now.  He has not adjusted other meds with this new drug.      He has stopped the amiodarone with the paxlovid startup.  After seeing [place] cardiologist this was part of their discussion to eventually come off the amio.      Given his & family's perceived benefit with improvement after starting we will finish the course of paxlovid with some adjustments.      I want to make the following changes to his meds when taking the paxlovid.  After concluding therapy he can return to his \"usual\" doses of meds except for the amiodarone we will keep him off at this time and he will follow-up with cardiology as recommended.    When on Paxlovid   Paxlovid-nirmatrelvir 150 mg 1 capsule (CHANGED) and ritonavir 100 mg 1 capsule (Unchanged) - twice daily for both of these    Amiodarone (Cordarone) - STAY OFF THIS MED Permanent- he stopped on his own before starting Paxlovid as cardiology has suggested this was appropriate, we will keep him off.  He will need to discuss this with cardiology.    Flomax -interactions exists we will keep him on this pill for now as he has not had side effects however if lightheadedness develops please phone the office.    Apixaban (Eliquis) -interactions exist please watch for bleeding.      Atorvastatin (Lipitor) -please break this pill in half when on Paxlovid    I discussed this with daughter Gianna and son Mason.  They will notify me with any issues.

## 2025-02-19 ENCOUNTER — HOSPITAL ENCOUNTER (OUTPATIENT)
Dept: CARDIAC REHAB | Age: 80
Setting detail: THERAPIES SERIES
Discharge: HOME | End: 2025-02-19
Payer: MEDICARE

## 2025-02-19 VITALS
SYSTOLIC BLOOD PRESSURE: 142 MMHG | OXYGEN SATURATION: 97 % | BODY MASS INDEX: 32.76 KG/M2 | WEIGHT: 234 LBS | HEART RATE: 63 BPM | RESPIRATION RATE: 20 BRPM | DIASTOLIC BLOOD PRESSURE: 70 MMHG | HEIGHT: 71 IN

## 2025-02-19 PROCEDURE — 93798 PHYS/QHP OP CAR RHAB W/ECG: CPT

## 2025-02-19 PROCEDURE — 93797 PHYS/QHP OP CAR RHAB WO ECG: CPT

## 2025-02-19 ASSESSMENT — PATIENT HEALTH QUESTIONNAIRE - PHQ9
4. FEELING TIRED OR HAVING LITTLE ENERGY: NOT AT ALL
1. LITTLE INTEREST OR PLEASURE IN DOING THINGS: NOT AT ALL
5. POOR APPETITE OR OVEREATING: NOT AT ALL
3. TROUBLE FALLING OR STAYING ASLEEP: NOT AT ALL
6. FEELING BAD ABOUT YOURSELF - OR THAT YOU ARE A FAILURE OR HAVE LET YOURSELF OR YOUR FAMILY DOWN: NOT AT ALL
9. THOUGHTS THAT YOU WOULD BE BETTER OFF DEAD, OR OF HURTING YOURSELF: NOT AT ALL
7. TROUBLE CONCENTRATING ON THINGS, SUCH AS READING THE NEWSPAPER OR WATCHING TELEVISION: NOT AT ALL
SUM OF ALL RESPONSES TO PHQ QUESTIONS 1-9: 0
SUM OF ALL RESPONSES TO PHQ9 QUESTIONS 1 & 2: 0
2. FEELING DOWN, DEPRESSED OR HOPELESS: NOT AT ALL
SUM OF ALL RESPONSES TO PHQ QUESTIONS 1-9: 0
8. MOVING OR SPEAKING SO SLOWLY THAT OTHER PEOPLE COULD HAVE NOTICED. OR THE OPPOSITE, BEING SO FIGETY OR RESTLESS THAT YOU HAVE BEEN MOVING AROUND A LOT MORE THAN USUAL: NOT AT ALL
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
## 2025-02-19 NOTE — FLOWSHEET NOTE
Patient scored a 0 on his PHQ-9 form. He has no history of depression and denies any suicidal ideation or wanting to harm himself at this time.

## 2025-02-25 ENCOUNTER — HOSPITAL ENCOUNTER (OUTPATIENT)
Dept: CARDIAC REHAB | Age: 80
Setting detail: THERAPIES SERIES
Discharge: HOME | End: 2025-02-25
Payer: MEDICARE

## 2025-02-25 PROCEDURE — 93798 PHYS/QHP OP CAR RHAB W/ECG: CPT

## 2025-02-26 ENCOUNTER — OFFICE VISIT (OUTPATIENT)
Dept: ORTHOPEDIC SURGERY | Age: 80
End: 2025-02-26

## 2025-02-26 VITALS
OXYGEN SATURATION: 98 % | HEIGHT: 71 IN | BODY MASS INDEX: 32.76 KG/M2 | TEMPERATURE: 98.4 F | DIASTOLIC BLOOD PRESSURE: 82 MMHG | RESPIRATION RATE: 20 BRPM | SYSTOLIC BLOOD PRESSURE: 139 MMHG | WEIGHT: 234 LBS | HEART RATE: 64 BPM

## 2025-02-26 DIAGNOSIS — M17.12 PRIMARY OSTEOARTHRITIS OF LEFT KNEE: ICD-10-CM

## 2025-02-26 DIAGNOSIS — Z96.652 S/P TOTAL KNEE REPLACEMENT, LEFT: Primary | ICD-10-CM

## 2025-02-26 NOTE — PROGRESS NOTES
[facility]   ORTHOPAEDIC SURGERY   DATE OF VISIT: 02/26/25  Follow Up Post Operative Visit     CHIEF COMPLAINT:   Chief Complaint   Patient presents with    Follow Up After Procedure     Surgery: Brien Robot Assisted Left total knee arthroplasty   Date: 10/29/2024      Post-Op Check     17 weeks    Pain     Fell Sunday onto both knees        Surgery: Brien Robot Assisted Left total knee arthroplasty   Date: 10/29/2024    Subjective:    Mason Gudino is here for follow up visit s/p above procedure.  He is doing well.  He has been getting around well with no assistance.  He has no pain    Controlled Substances Monitoring:        Physical Exam:    Height: 1.803 m (5' 11\"), Weight - Scale: 106.1 kg (234 lb), BP: 139/82    General: Alert and oriented x3, no acute distress  Cardiovascular/pulmonary: No labored breathing, peripheral perfusion intact  Musculoskeletal:    Exam of the knee shows healed incision.  No swelling.  Range of motion is about 0 to 125 degrees.  The knee is stable        Imaging:     Imaging reviewed showing well aligned knee replacement      Assessment and Plan:    He is about 4 months out from total knee arthroplasty doing very well    He will continue with activities as tolerated.  He can follow-up as needed            Lázaro Blackburn MD  Orthopaedic Surgery   2/26/25    The patient (or guardian, if applicable) and other individuals in attendance with the patient were advised that Artificial Intelligence will be utilized during this visit to record, process the conversation to generate a clinical note, and support improvement of the AI technology. The patient (or guardian, if applicable) and other individuals in attendance at the appointment consented to the use of AI, including the recording.

## 2025-02-27 ENCOUNTER — HOSPITAL ENCOUNTER (OUTPATIENT)
Dept: CARDIAC REHAB | Age: 80
Setting detail: THERAPIES SERIES
Discharge: HOME | End: 2025-02-27
Payer: MEDICARE

## 2025-02-27 PROCEDURE — 93798 PHYS/QHP OP CAR RHAB W/ECG: CPT

## 2025-03-04 ENCOUNTER — HOSPITAL ENCOUNTER (OUTPATIENT)
Dept: CARDIAC REHAB | Age: 80
Setting detail: THERAPIES SERIES
Discharge: HOME | End: 2025-03-04
Payer: MEDICARE

## 2025-03-04 PROCEDURE — 93798 PHYS/QHP OP CAR RHAB W/ECG: CPT

## 2025-03-06 ENCOUNTER — HOSPITAL ENCOUNTER (OUTPATIENT)
Dept: CARDIAC REHAB | Age: 80
Setting detail: THERAPIES SERIES
Discharge: HOME | End: 2025-03-06
Payer: MEDICARE

## 2025-03-06 PROCEDURE — 93798 PHYS/QHP OP CAR RHAB W/ECG: CPT

## 2025-03-11 ENCOUNTER — HOSPITAL ENCOUNTER (OUTPATIENT)
Dept: CARDIAC REHAB | Age: 80
Setting detail: THERAPIES SERIES
Discharge: HOME | End: 2025-03-11
Payer: MEDICARE

## 2025-03-11 PROCEDURE — 93798 PHYS/QHP OP CAR RHAB W/ECG: CPT

## 2025-03-13 ENCOUNTER — HOSPITAL ENCOUNTER (OUTPATIENT)
Dept: CARDIAC REHAB | Age: 80
Setting detail: THERAPIES SERIES
Discharge: HOME | End: 2025-03-13
Payer: MEDICARE

## 2025-03-13 PROCEDURE — 93798 PHYS/QHP OP CAR RHAB W/ECG: CPT

## 2025-03-18 ENCOUNTER — HOSPITAL ENCOUNTER (OUTPATIENT)
Dept: CARDIAC REHAB | Age: 80
Setting detail: THERAPIES SERIES
Discharge: HOME | End: 2025-03-18
Payer: MEDICARE

## 2025-03-18 PROCEDURE — 93798 PHYS/QHP OP CAR RHAB W/ECG: CPT

## 2025-03-20 ENCOUNTER — HOSPITAL ENCOUNTER (OUTPATIENT)
Dept: CARDIAC REHAB | Age: 80
Setting detail: THERAPIES SERIES
Discharge: HOME | End: 2025-03-20
Payer: MEDICARE

## 2025-03-20 PROCEDURE — 93798 PHYS/QHP OP CAR RHAB W/ECG: CPT

## 2025-03-25 ENCOUNTER — HOSPITAL ENCOUNTER (OUTPATIENT)
Dept: CARDIAC REHAB | Age: 80
Setting detail: THERAPIES SERIES
Discharge: HOME | End: 2025-03-25
Payer: MEDICARE

## 2025-03-25 PROCEDURE — 93798 PHYS/QHP OP CAR RHAB W/ECG: CPT

## 2025-03-27 ENCOUNTER — HOSPITAL ENCOUNTER (OUTPATIENT)
Dept: CARDIAC REHAB | Age: 80
Setting detail: THERAPIES SERIES
Discharge: HOME | End: 2025-03-27
Payer: MEDICARE

## 2025-03-27 PROCEDURE — 93798 PHYS/QHP OP CAR RHAB W/ECG: CPT

## 2025-03-31 RX ORDER — FOLIC ACID 1 MG/1
1 TABLET ORAL DAILY
Qty: 90 TABLET | Refills: 1 | OUTPATIENT
Start: 2025-03-31

## 2025-04-01 ENCOUNTER — HOSPITAL ENCOUNTER (OUTPATIENT)
Dept: CARDIAC REHAB | Age: 80
Setting detail: THERAPIES SERIES
Discharge: HOME | End: 2025-04-01
Payer: MEDICARE

## 2025-04-01 PROCEDURE — 93798 PHYS/QHP OP CAR RHAB W/ECG: CPT

## 2025-04-03 ENCOUNTER — HOSPITAL ENCOUNTER (OUTPATIENT)
Dept: CARDIAC REHAB | Age: 80
Setting detail: THERAPIES SERIES
Discharge: HOME | End: 2025-04-03
Payer: MEDICARE

## 2025-04-03 PROCEDURE — 93798 PHYS/QHP OP CAR RHAB W/ECG: CPT

## 2025-04-03 NOTE — TELEPHONE ENCOUNTER
Name of Medication(s) Requested:  Requested Prescriptions     Pending Prescriptions Disp Refills    Insulin Pen Needle 32G X 4 MM MISC 100 each 3     Si each by Does not apply route daily       Medication is on current medication list Yes    Dosage and directions were verified? Yes    Quantity verified: 90 day supply     Pharmacy Verified?  Yes    Last Appointment:  2025    Future appts:  Future Appointments   Date Time Provider Department Center   2025  6:00 AM [facility] RM01 [facility]   4/10/2025  6:00 AM [facility]   4/15/2025  6:00 AM [facility] RM01 [facility]   2025  6:00 AM [facility] RM01 [facility]   2025  8:30 AM Abhinav Elkins MD [facility]   2025  6:00 AM [facility] EX RM01 [facility]   2025  6:00 AM [facility] EX RM01 [facility]   2025  6:00 AM [facility] RM01 [facility]   2025  6:00 AM [facility] RM01 [facility]   2025  6:00 AM [facility] RM01 [facility]   2025  6:00 AM [facility] RM01 [facility]   2025  6:00 AM [facility] RM01 [facility]   5/15/2025  6:00 AM [facility]01 [facility]   5/15/2025  7:45 AM Phil Amin MD [facility]   2025  6:00 AM [facility] EX RM01 [facility]   2025  6:00 AM [facility]01 [facility]   2025  6:00 AM [facility] EX RM01 [facility]   2025  2:40 PM Nemesio Hernandez APRN - [facility]   2025  6:00 AM [facility] EX RM01 [facility]   6/3/2025  6:00 AM [facility] EX RM01 [facility]   2025  6:00 AM [facility] CARDIAC

## 2025-04-08 ENCOUNTER — HOSPITAL ENCOUNTER (OUTPATIENT)
Dept: CARDIAC REHAB | Age: 80
Setting detail: THERAPIES SERIES
Discharge: HOME | End: 2025-04-08
Payer: MEDICARE

## 2025-04-08 PROCEDURE — 93798 PHYS/QHP OP CAR RHAB W/ECG: CPT

## 2025-04-10 ENCOUNTER — HOSPITAL ENCOUNTER (OUTPATIENT)
Dept: CARDIAC REHAB | Age: 80
Setting detail: THERAPIES SERIES
Discharge: HOME | End: 2025-04-10
Payer: MEDICARE

## 2025-04-10 DIAGNOSIS — J44.9 CHRONIC OBSTRUCTIVE PULMONARY DISEASE, UNSPECIFIED COPD TYPE (HCC): ICD-10-CM

## 2025-04-10 PROCEDURE — 93798 PHYS/QHP OP CAR RHAB W/ECG: CPT

## 2025-04-10 RX ORDER — BUDESONIDE AND FORMOTEROL FUMARATE DIHYDRATE 160; 4.5 UG/1; UG/1
2 AEROSOL RESPIRATORY (INHALATION) 2 TIMES DAILY
Qty: 3 EACH | Refills: 1 | Status: SHIPPED | OUTPATIENT
Start: 2025-04-10

## 2025-04-10 NOTE — TELEPHONE ENCOUNTER
6/12/2025  6:00 AM [facility]   6/17/2025  6:00 AM [facility] CARDIAC REHAB EX RM01 [facility]   12/8/2025 11:00 AM Abhinav Elkins MD [facility] ECC DEP        (If no appt send self scheduling link. .REFILLAPPT)  Scheduling request sent?     [] Yes  [x] No    Does patient need updated?  [] Yes  [x] No

## 2025-04-14 ASSESSMENT — PATIENT HEALTH QUESTIONNAIRE - PHQ9
9. THOUGHTS THAT YOU WOULD BE BETTER OFF DEAD, OR OF HURTING YOURSELF: NOT AT ALL
SUM OF ALL RESPONSES TO PHQ QUESTIONS 1-9: 1
1. LITTLE INTEREST OR PLEASURE IN DOING THINGS: NOT AT ALL
2. FEELING DOWN, DEPRESSED OR HOPELESS: NOT AT ALL
7. TROUBLE CONCENTRATING ON THINGS, SUCH AS READING THE NEWSPAPER OR WATCHING TELEVISION: NOT AT ALL
SUM OF ALL RESPONSES TO PHQ QUESTIONS 1-9: 1
5. POOR APPETITE OR OVEREATING: NOT AT ALL
6. FEELING BAD ABOUT YOURSELF - OR THAT YOU ARE A FAILURE OR HAVE LET YOURSELF OR YOUR FAMILY DOWN: NOT AT ALL
3. TROUBLE FALLING OR STAYING ASLEEP: SEVERAL DAYS
8. MOVING OR SPEAKING SO SLOWLY THAT OTHER PEOPLE COULD HAVE NOTICED. OR THE OPPOSITE, BEING SO FIGETY OR RESTLESS THAT YOU HAVE BEEN MOVING AROUND A LOT MORE THAN USUAL: NOT AT ALL
10. IF YOU CHECKED OFF ANY PROBLEMS, HOW DIFFICULT HAVE THESE PROBLEMS MADE IT FOR YOU TO DO YOUR WORK, TAKE CARE OF THINGS AT HOME, OR GET ALONG WITH OTHER PEOPLE: NOT DIFFICULT AT ALL
4. FEELING TIRED OR HAVING LITTLE ENERGY: NOT AT ALL

## 2025-04-15 ENCOUNTER — HOSPITAL ENCOUNTER (OUTPATIENT)
Dept: CARDIAC REHAB | Age: 80
Setting detail: THERAPIES SERIES
Discharge: HOME | End: 2025-04-15
Payer: MEDICARE

## 2025-04-15 PROCEDURE — 93798 PHYS/QHP OP CAR RHAB W/ECG: CPT

## 2025-04-17 ENCOUNTER — HOSPITAL ENCOUNTER (OUTPATIENT)
Dept: CARDIAC REHAB | Age: 80
Setting detail: THERAPIES SERIES
Discharge: HOME | End: 2025-04-17
Payer: MEDICARE

## 2025-04-17 PROCEDURE — 93798 PHYS/QHP OP CAR RHAB W/ECG: CPT

## 2025-04-18 ENCOUNTER — OFFICE VISIT (OUTPATIENT)
Dept: FAMILY MEDICINE CLINIC | Age: 80
End: 2025-04-18
Payer: MEDICARE

## 2025-04-18 VITALS
HEIGHT: 71 IN | DIASTOLIC BLOOD PRESSURE: 62 MMHG | SYSTOLIC BLOOD PRESSURE: 116 MMHG | WEIGHT: 242 LBS | TEMPERATURE: 98.6 F | OXYGEN SATURATION: 96 % | HEART RATE: 68 BPM | BODY MASS INDEX: 33.88 KG/M2 | RESPIRATION RATE: 20 BRPM

## 2025-04-18 DIAGNOSIS — Z79.4 TYPE 2 DIABETES MELLITUS WITH STAGE 3A CHRONIC KIDNEY DISEASE, WITH LONG-TERM CURRENT USE OF INSULIN (HCC): Primary | ICD-10-CM

## 2025-04-18 DIAGNOSIS — R09.81 NASAL CONGESTION: ICD-10-CM

## 2025-04-18 DIAGNOSIS — E11.22 TYPE 2 DIABETES MELLITUS WITH STAGE 3A CHRONIC KIDNEY DISEASE, WITH LONG-TERM CURRENT USE OF INSULIN (HCC): Primary | ICD-10-CM

## 2025-04-18 DIAGNOSIS — I50.32 CHRONIC HEART FAILURE WITH PRESERVED EJECTION FRACTION (HCC): ICD-10-CM

## 2025-04-18 DIAGNOSIS — N18.31 TYPE 2 DIABETES MELLITUS WITH STAGE 3A CHRONIC KIDNEY DISEASE, WITH LONG-TERM CURRENT USE OF INSULIN (HCC): Primary | ICD-10-CM

## 2025-04-18 DIAGNOSIS — J44.9 CHRONIC OBSTRUCTIVE PULMONARY DISEASE, UNSPECIFIED COPD TYPE (HCC): ICD-10-CM

## 2025-04-18 DIAGNOSIS — I48.19 PERSISTENT ATRIAL FIBRILLATION (HCC): ICD-10-CM

## 2025-04-18 PROCEDURE — G8427 DOCREV CUR MEDS BY ELIG CLIN: HCPCS | Performed by: FAMILY MEDICINE

## 2025-04-18 PROCEDURE — 3078F DIAST BP <80 MM HG: CPT | Performed by: FAMILY MEDICINE

## 2025-04-18 PROCEDURE — 3023F SPIROM DOC REV: CPT | Performed by: FAMILY MEDICINE

## 2025-04-18 PROCEDURE — 1036F TOBACCO NON-USER: CPT | Performed by: FAMILY MEDICINE

## 2025-04-18 PROCEDURE — 1123F ACP DISCUSS/DSCN MKR DOCD: CPT | Performed by: FAMILY MEDICINE

## 2025-04-18 PROCEDURE — 3074F SYST BP LT 130 MM HG: CPT | Performed by: FAMILY MEDICINE

## 2025-04-18 PROCEDURE — G8417 CALC BMI ABV UP PARAM F/U: HCPCS | Performed by: FAMILY MEDICINE

## 2025-04-18 PROCEDURE — 1159F MED LIST DOCD IN RCRD: CPT | Performed by: FAMILY MEDICINE

## 2025-04-18 PROCEDURE — 99214 OFFICE O/P EST MOD 30 MIN: CPT | Performed by: FAMILY MEDICINE

## 2025-04-18 RX ORDER — UMECLIDINIUM 62.5 UG/1
1 AEROSOL, POWDER ORAL DAILY
Qty: 30 EACH | Refills: 2 | Status: SHIPPED | OUTPATIENT
Start: 2025-04-18

## 2025-04-18 RX ORDER — FLUTICASONE PROPIONATE 50 MCG
2 SPRAY, SUSPENSION (ML) NASAL DAILY
Qty: 3 EACH | Refills: 1 | Status: SHIPPED | OUTPATIENT
Start: 2025-04-18

## 2025-04-18 RX ORDER — ECHINACEA PURPUREA EXTRACT 125 MG
1 TABLET ORAL PRN
Qty: 1 EACH | Refills: 3 | Status: SHIPPED | OUTPATIENT
Start: 2025-04-18

## 2025-04-18 ASSESSMENT — ENCOUNTER SYMPTOMS
CHEST TIGHTNESS: 0
SHORTNESS OF BREATH: 0
ABDOMINAL PAIN: 0

## 2025-04-18 NOTE — PROGRESS NOTES
[facility]  Department of Family Medicine  Family Medicine Residency Program      Patient:  Mason Gudino 80 y.o. male  Date of Service: 4/18/25      Chief complaint:   Chief Complaint   Patient presents with    Diabetes    Other     Patient unsure of what medications he is taking, states his daughter takes care of that.       Assessment and Plan   1. Type 2 diabetes mellitus with stage 3a chronic kidney disease, with long-term current use of insulin (HCC)  Controlled with estimated A1C of 7.1, no specific problems reported. Will not make changes.     2. Persistent atrial fibrillation (HCC)  Rate controlled followed by cardiology - tolerating anticoagulation     3. Chronic heart failure with preserved ejection fraction (HCC)  Participating in cardiac rehab. Fluid balanced.     4. Chronic obstructive pulmonary disease, unspecified COPD type (HCC)  Stable breathing sx.   - umeclidinium bromide (INCRUSE ELLIPTA) 62.5 MCG/ACT inhaler; Inhale 1 puff into the lungs daily  Dispense: 30 each; Refill: 2    5. Nasal congestion  Complained of nasal congestion especially at night with CPAP treatments.   --I will add nasal saline for rinsing his nose as needed (may use multiple times per day).    --I will add flonase daily - this has an odor and bitter taste for some people.  This needs to be used consistently for 2+ weeks to become effective.  Point this spray towards the ear - it goes into the nose pointed laterally and horizontal to the ground.  After 2 weeks of consistent use if not helpful then the med likely isn't working.     - sodium chloride (ALTAMIST SPRAY) 0.65 % nasal spray; 1 spray by Nasal route as needed for Congestion  Dispense: 1 each; Refill: 3  - fluticasone (FLONASE) 50 MCG/ACT nasal spray; 2 sprays by Each Nostril route daily  Dispense: 3 each; Refill: 1      Return to Office: Return in about 4 months (around 8/18/2025) for Diabetes, COPD, A-fib.    History ofPresent Illness

## 2025-04-18 NOTE — PATIENT INSTRUCTIONS
Complained of nasal congestion especially at night with CPAP treatments.   --I will add nasal saline for rinsing his nose as needed (may use multiple times per day).    --I will add flonase daily - this has an odor and bitter taste for some people.  This needs to be used consistently for 2+ weeks to become effective.  Point this spray towards the ear - it goes into the nose pointed laterally and horizontal to the ground.  After 2 weeks of consistent use if not helpful then the med likely isn't working.     Will get labs prior to next visit.

## 2025-04-21 RX ORDER — UMECLIDINIUM 62.5 UG/1
AEROSOL, POWDER ORAL
Qty: 90 EACH | Refills: 3 | OUTPATIENT
Start: 2025-04-21

## 2025-04-22 ENCOUNTER — HOSPITAL ENCOUNTER (OUTPATIENT)
Dept: CARDIAC REHAB | Age: 80
Setting detail: THERAPIES SERIES
Discharge: HOME | End: 2025-04-22
Payer: MEDICARE

## 2025-04-22 PROCEDURE — 93798 PHYS/QHP OP CAR RHAB W/ECG: CPT

## 2025-04-24 ENCOUNTER — HOSPITAL ENCOUNTER (OUTPATIENT)
Dept: CARDIAC REHAB | Age: 80
Setting detail: THERAPIES SERIES
Discharge: HOME | End: 2025-04-24
Payer: MEDICARE

## 2025-04-24 PROCEDURE — 93798 PHYS/QHP OP CAR RHAB W/ECG: CPT

## 2025-04-29 ENCOUNTER — HOSPITAL ENCOUNTER (OUTPATIENT)
Dept: CARDIAC REHAB | Age: 80
Setting detail: THERAPIES SERIES
Discharge: HOME | End: 2025-04-29
Payer: MEDICARE

## 2025-04-29 PROCEDURE — 93798 PHYS/QHP OP CAR RHAB W/ECG: CPT

## 2025-05-01 ENCOUNTER — HOSPITAL ENCOUNTER (OUTPATIENT)
Dept: CARDIAC REHAB | Age: 80
Setting detail: THERAPIES SERIES
Discharge: HOME | End: 2025-05-01
Payer: MEDICARE

## 2025-05-01 PROCEDURE — 93798 PHYS/QHP OP CAR RHAB W/ECG: CPT

## 2025-05-05 RX ORDER — DONEPEZIL HYDROCHLORIDE 10 MG/1
10 TABLET, FILM COATED ORAL NIGHTLY
Qty: 90 TABLET | Refills: 3 | Status: SHIPPED | OUTPATIENT
Start: 2025-05-05

## 2025-05-05 RX ORDER — FOLIC ACID 1 MG/1
1 TABLET ORAL DAILY
Qty: 90 TABLET | Refills: 1 | Status: SHIPPED | OUTPATIENT
Start: 2025-05-05

## 2025-05-05 RX ORDER — METOPROLOL SUCCINATE 25 MG/1
25 TABLET, EXTENDED RELEASE ORAL EVERY MORNING
Qty: 90 TABLET | Refills: 3 | Status: SHIPPED | OUTPATIENT
Start: 2025-05-05

## 2025-05-05 RX ORDER — MAGNESIUM OXIDE 400 MG/1
400 TABLET ORAL DAILY
Qty: 90 TABLET | Refills: 1 | Status: SHIPPED | OUTPATIENT
Start: 2025-05-05

## 2025-05-05 NOTE — TELEPHONE ENCOUNTER
Name of Medication(s) Requested:  Requested Prescriptions     Pending Prescriptions Disp Refills    donepezil (ARICEPT) 10 MG tablet [Pharmacy Med Name: Donepezil HCl 10 MG Oral Tablet] 90 tablet 3     Sig: TAKE 1 TABLET BY MOUTH EVERY  NIGHT    folic acid (FOLVITE) 1 MG tablet 90 tablet 1     Sig: Take 1 tablet by mouth daily    magnesium oxide (MAG-OX) 400 MG tablet 90 tablet 1     Sig: Take 1 tablet by mouth daily    metoprolol succinate (TOPROL XL) 25 MG extended release tablet 30 tablet 3     Sig: Take 1 tablet by mouth every morning       Medication is on current medication list Yes    Dosage and directions were verified? Yes    Quantity verified: 90 day supply     Pharmacy Verified?  Yes    Last Appointment:  4/18/2025    Future appts:  Future Appointments   Date Time Provider Department Center   5/6/2025  6:00 AM [facility] EX RM01 [facility]   5/8/2025  6:00 AM [facility] EX RM01 [facility]   5/13/2025  6:00 AM [facility] EX RM01 [facility]   5/15/2025  6:00 AM [facility] EX RM01 [facility]   5/15/2025  7:45 AM Phil Amin MD [facility]   5/20/2025  6:00 AM [facility] EX RM01 [facility]   5/22/2025  6:00 AM [facility] EX RM01 [facility]   5/27/2025  6:00 AM [facility] EX RM01 [facility]   5/27/2025  2:40 PM Nemesio Hernandez, APRN - [facility]   5/29/2025  6:00 AM [facility] EX RM01 [facility]   6/3/2025  6:00 AM [facility] EX RM01 [facility]   6/5/2025  6:00 AM [facility] EX RM01 [facility]   6/10/2025  6:00 AM [facility] CARDIAC REHAB EX RM01 [facility]   6/12/2025  6:00 AM [facility]   6/17/2025  6:00 AM [facility] CARDIAC REHAB EX RM01 [facility]   8/18/2025  9:15 AM Abhinav Elkins MD

## 2025-05-06 ENCOUNTER — HOSPITAL ENCOUNTER (OUTPATIENT)
Dept: CARDIAC REHAB | Age: 80
Setting detail: THERAPIES SERIES
Discharge: HOME | End: 2025-05-06
Payer: MEDICARE

## 2025-05-06 PROCEDURE — 93798 PHYS/QHP OP CAR RHAB W/ECG: CPT

## 2025-05-08 ENCOUNTER — HOSPITAL ENCOUNTER (OUTPATIENT)
Dept: CARDIAC REHAB | Age: 80
Setting detail: THERAPIES SERIES
Discharge: HOME | End: 2025-05-08
Payer: MEDICARE

## 2025-05-08 PROCEDURE — 93798 PHYS/QHP OP CAR RHAB W/ECG: CPT

## 2025-05-13 ENCOUNTER — HOSPITAL ENCOUNTER (OUTPATIENT)
Dept: CARDIAC REHAB | Age: 80
Setting detail: THERAPIES SERIES
Discharge: HOME | End: 2025-05-13
Payer: MEDICARE

## 2025-05-13 PROCEDURE — 93798 PHYS/QHP OP CAR RHAB W/ECG: CPT

## 2025-05-15 ENCOUNTER — OFFICE VISIT (OUTPATIENT)
Dept: CARDIOLOGY CLINIC | Age: 80
End: 2025-05-15
Payer: MEDICARE

## 2025-05-15 ENCOUNTER — HOSPITAL ENCOUNTER (OUTPATIENT)
Dept: CARDIAC REHAB | Age: 80
Setting detail: THERAPIES SERIES
Discharge: HOME | End: 2025-05-15
Payer: MEDICARE

## 2025-05-15 VITALS
DIASTOLIC BLOOD PRESSURE: 66 MMHG | RESPIRATION RATE: 16 BRPM | HEIGHT: 71 IN | HEART RATE: 62 BPM | OXYGEN SATURATION: 95 % | SYSTOLIC BLOOD PRESSURE: 122 MMHG | WEIGHT: 243 LBS | BODY MASS INDEX: 34.02 KG/M2 | TEMPERATURE: 97.8 F

## 2025-05-15 DIAGNOSIS — I50.32 CHRONIC HEART FAILURE WITH PRESERVED EJECTION FRACTION (HCC): Primary | ICD-10-CM

## 2025-05-15 DIAGNOSIS — I25.10 CORONARY ARTERY DISEASE INVOLVING NATIVE CORONARY ARTERY OF NATIVE HEART WITHOUT ANGINA PECTORIS: ICD-10-CM

## 2025-05-15 DIAGNOSIS — G47.33 OSA ON CPAP: ICD-10-CM

## 2025-05-15 DIAGNOSIS — Z95.2 S/P TAVR (TRANSCATHETER AORTIC VALVE REPLACEMENT): Chronic | ICD-10-CM

## 2025-05-15 DIAGNOSIS — Z51.81 ENCOUNTER FOR MONITORING AMIODARONE THERAPY: Chronic | ICD-10-CM

## 2025-05-15 DIAGNOSIS — Z79.899 ENCOUNTER FOR MONITORING AMIODARONE THERAPY: Chronic | ICD-10-CM

## 2025-05-15 DIAGNOSIS — I48.19 PERSISTENT ATRIAL FIBRILLATION (HCC): ICD-10-CM

## 2025-05-15 DIAGNOSIS — N18.9 CHRONIC KIDNEY DISEASE, UNSPECIFIED CKD STAGE: ICD-10-CM

## 2025-05-15 PROCEDURE — G8417 CALC BMI ABV UP PARAM F/U: HCPCS | Performed by: INTERNAL MEDICINE

## 2025-05-15 PROCEDURE — 1123F ACP DISCUSS/DSCN MKR DOCD: CPT | Performed by: INTERNAL MEDICINE

## 2025-05-15 PROCEDURE — 1159F MED LIST DOCD IN RCRD: CPT | Performed by: INTERNAL MEDICINE

## 2025-05-15 PROCEDURE — 3078F DIAST BP <80 MM HG: CPT | Performed by: INTERNAL MEDICINE

## 2025-05-15 PROCEDURE — 93798 PHYS/QHP OP CAR RHAB W/ECG: CPT

## 2025-05-15 PROCEDURE — 93000 ELECTROCARDIOGRAM COMPLETE: CPT | Performed by: INTERNAL MEDICINE

## 2025-05-15 PROCEDURE — 99214 OFFICE O/P EST MOD 30 MIN: CPT | Performed by: INTERNAL MEDICINE

## 2025-05-15 PROCEDURE — 3074F SYST BP LT 130 MM HG: CPT | Performed by: INTERNAL MEDICINE

## 2025-05-15 PROCEDURE — G8427 DOCREV CUR MEDS BY ELIG CLIN: HCPCS | Performed by: INTERNAL MEDICINE

## 2025-05-15 PROCEDURE — 1036F TOBACCO NON-USER: CPT | Performed by: INTERNAL MEDICINE

## 2025-05-15 RX ORDER — MULTIVIT-MIN/IRON/FOLIC ACID/K 18-600-40
2000 CAPSULE ORAL DAILY
COMMUNITY

## 2025-05-15 ASSESSMENT — PATIENT HEALTH QUESTIONNAIRE - PHQ9
6. FEELING BAD ABOUT YOURSELF - OR THAT YOU ARE A FAILURE OR HAVE LET YOURSELF OR YOUR FAMILY DOWN: NOT AT ALL
SUM OF ALL RESPONSES TO PHQ QUESTIONS 1-9: 0
9. THOUGHTS THAT YOU WOULD BE BETTER OFF DEAD, OR OF HURTING YOURSELF: NOT AT ALL
1. LITTLE INTEREST OR PLEASURE IN DOING THINGS: NOT AT ALL
4. FEELING TIRED OR HAVING LITTLE ENERGY: NOT AT ALL
7. TROUBLE CONCENTRATING ON THINGS, SUCH AS READING THE NEWSPAPER OR WATCHING TELEVISION: NOT AT ALL
2. FEELING DOWN, DEPRESSED OR HOPELESS: NOT AT ALL
10. IF YOU CHECKED OFF ANY PROBLEMS, HOW DIFFICULT HAVE THESE PROBLEMS MADE IT FOR YOU TO DO YOUR WORK, TAKE CARE OF THINGS AT HOME, OR GET ALONG WITH OTHER PEOPLE: NOT DIFFICULT AT ALL
8. MOVING OR SPEAKING SO SLOWLY THAT OTHER PEOPLE COULD HAVE NOTICED. OR THE OPPOSITE, BEING SO FIGETY OR RESTLESS THAT YOU HAVE BEEN MOVING AROUND A LOT MORE THAN USUAL: NOT AT ALL
3. TROUBLE FALLING OR STAYING ASLEEP: NOT AT ALL
5. POOR APPETITE OR OVEREATING: NOT AT ALL
SUM OF ALL RESPONSES TO PHQ QUESTIONS 1-9: 0
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
## 2025-05-20 ENCOUNTER — HOSPITAL ENCOUNTER (OUTPATIENT)
Dept: CARDIAC REHAB | Age: 80
Setting detail: THERAPIES SERIES
Discharge: HOME | End: 2025-05-20
Payer: MEDICARE

## 2025-05-20 PROCEDURE — 93798 PHYS/QHP OP CAR RHAB W/ECG: CPT

## 2025-05-22 ENCOUNTER — HOSPITAL ENCOUNTER (OUTPATIENT)
Dept: CARDIAC REHAB | Age: 80
Setting detail: THERAPIES SERIES
Discharge: HOME | End: 2025-05-22
Payer: MEDICARE

## 2025-05-22 PROCEDURE — 93798 PHYS/QHP OP CAR RHAB W/ECG: CPT

## 2025-05-27 ENCOUNTER — HOSPITAL ENCOUNTER (OUTPATIENT)
Dept: CARDIAC REHAB | Age: 80
Setting detail: THERAPIES SERIES
Discharge: HOME | End: 2025-05-27
Payer: MEDICARE

## 2025-05-27 ENCOUNTER — HOSPITAL ENCOUNTER (OUTPATIENT)
Age: 80
Discharge: HOME | End: 2025-05-27

## 2025-05-27 DIAGNOSIS — I50.32 CHRONIC HEART FAILURE WITH PRESERVED EJECTION FRACTION (HCC): ICD-10-CM

## 2025-05-27 LAB
ALBUMIN SERPL-MCNC: 3.7 G/DL (ref 3.5–5.2)
ALP SERPL-CCNC: 74 U/L (ref 40–129)
ALT SERPL-CCNC: 17 U/L (ref 0–50)
ANION GAP SERPL CALCULATED.3IONS-SCNC: 10 MMOL/L (ref 7–16)
AST SERPL-CCNC: 20 U/L (ref 0–50)
BILIRUB SERPL-MCNC: 0.4 MG/DL (ref 0–1.2)
BUN SERPL-MCNC: 30 MG/DL (ref 8–23)
CALCIUM SERPL-MCNC: 9.4 MG/DL (ref 8.8–10.2)
CHLORIDE SERPL-SCNC: 103 MMOL/L (ref 98–107)
CO2 SERPL-SCNC: 28 MMOL/L (ref 22–29)
CREAT SERPL-MCNC: 1.7 MG/DL (ref 0.7–1.2)
GFR, ESTIMATED: 41 ML/MIN/1.73M2
GLUCOSE SERPL-MCNC: 145 MG/DL (ref 74–99)
POTASSIUM SERPL-SCNC: 4.1 MMOL/L (ref 3.5–5.1)
PROT SERPL-MCNC: 6.5 G/DL (ref 6.4–8.3)
SODIUM SERPL-SCNC: 140 MMOL/L (ref 136–145)
TSH SERPL DL<=0.05 MIU/L-ACNC: 1.49 UIU/ML (ref 0.27–4.2)

## 2025-05-27 PROCEDURE — 93798 PHYS/QHP OP CAR RHAB W/ECG: CPT

## 2025-05-29 ENCOUNTER — HOSPITAL ENCOUNTER (OUTPATIENT)
Dept: CARDIAC REHAB | Age: 80
Setting detail: THERAPIES SERIES
Discharge: HOME | End: 2025-05-29
Payer: MEDICARE

## 2025-05-29 PROCEDURE — 93798 PHYS/QHP OP CAR RHAB W/ECG: CPT

## 2025-06-03 ENCOUNTER — HOSPITAL ENCOUNTER (OUTPATIENT)
Dept: CARDIAC REHAB | Age: 80
Setting detail: THERAPIES SERIES
Discharge: HOME | End: 2025-06-03
Payer: MEDICARE

## 2025-06-03 DIAGNOSIS — I48.0 PAROXYSMAL ATRIAL FIBRILLATION (HCC): ICD-10-CM

## 2025-06-03 PROCEDURE — 93798 PHYS/QHP OP CAR RHAB W/ECG: CPT

## 2025-06-04 RX ORDER — APIXABAN 2.5 MG/1
2.5 TABLET, FILM COATED ORAL 2 TIMES DAILY
Qty: 180 TABLET | Refills: 0 | Status: SHIPPED | OUTPATIENT
Start: 2025-06-04

## 2025-06-04 NOTE — TELEPHONE ENCOUNTER
Name of Medication(s) Requested:  Requested Prescriptions     Pending Prescriptions Disp Refills    ELIQUIS 2.5 MG TABS tablet [Pharmacy Med Name: Eliquis 2.5 MG Oral Tablet] 180 tablet 3     Sig: TAKE 1 TABLET BY MOUTH TWICE  DAILY       Medication is on current medication list Yes    Dosage and directions were verified? Yes    Quantity verified: 90 day supply     Pharmacy Verified?  Yes    Last Appointment:  4/18/2025    Future appts:  Future Appointments   Date Time Provider Department Center   6/5/2025  6:00 AM [facility] CARDIAC REHAB EX RM01 [facility]   6/10/2025  6:00 AM [facility] CARDIAC REHAB EX RM01 [facility]   6/12/2025  6:00 AM [facility] CARDIAC REHAB EX RM01 [facility]   6/17/2025  6:00 AM [facility] CARDIAC REHAB EX RM01 [facility]   8/18/2025  9:15 AM Abhinav Elkins MD [facility] ECC DEP   10/8/2025  3:00 PM Phil Amin MD [facility]   12/8/2025 11:00 AM Abhinav Elkins MD [facility] E[facility] DEP        (If no appt send self scheduling link. .REFILLAPPT)  Scheduling request sent?     [] Yes  [x] No    Does patient need updated?  [] Yes  [x] No

## 2025-06-05 ENCOUNTER — HOSPITAL ENCOUNTER (OUTPATIENT)
Dept: CARDIAC REHAB | Age: 80
Setting detail: THERAPIES SERIES
Discharge: HOME | End: 2025-06-05
Payer: MEDICARE

## 2025-06-05 DIAGNOSIS — J44.9 CHRONIC OBSTRUCTIVE PULMONARY DISEASE, UNSPECIFIED COPD TYPE (HCC): ICD-10-CM

## 2025-06-05 PROCEDURE — 93798 PHYS/QHP OP CAR RHAB W/ECG: CPT

## 2025-06-06 DIAGNOSIS — R09.81 NASAL CONGESTION: ICD-10-CM

## 2025-06-06 DIAGNOSIS — E11.9 TYPE 2 DIABETES MELLITUS WITHOUT COMPLICATION, WITHOUT LONG-TERM CURRENT USE OF INSULIN (HCC): ICD-10-CM

## 2025-06-06 RX ORDER — FLUTICASONE PROPIONATE 50 MCG
2 SPRAY, SUSPENSION (ML) NASAL DAILY
Qty: 3 EACH | Refills: 1 | Status: SHIPPED | OUTPATIENT
Start: 2025-06-06

## 2025-06-06 RX ORDER — UMECLIDINIUM 62.5 UG/1
AEROSOL, POWDER ORAL
Qty: 90 EACH | Refills: 1 | Status: SHIPPED | OUTPATIENT
Start: 2025-06-06

## 2025-06-06 RX ORDER — GLUCOSAMINE HCL/CHONDROITIN SU 500-400 MG
CAPSULE ORAL
Qty: 100 STRIP | Refills: 5 | Status: SHIPPED | OUTPATIENT
Start: 2025-06-06

## 2025-06-06 NOTE — TELEPHONE ENCOUNTER
*Attempted to contact patient/daughter to see if there is anything else they need, no answer.*    Name of Medication(s) Requested:  Requested Prescriptions     Pending Prescriptions Disp Refills    blood glucose monitor strips 100 strip 5     Sig: Test daily times a day & as needed for symptoms of irregular blood glucose. Dispense sufficient amount for indicated testing frequency plus additional to accommodate PRN testing needs.       Medication is on current medication list Yes    Dosage and directions were verified? Yes    Quantity verified: 90 day supply     Pharmacy Verified?  Yes    Last Appointment:  4/18/2025    Future appts:  Future Appointments   Date Time Provider Department Center   6/10/2025  6:00 AM [facility]   6/12/2025  6:00 AM [facility]   6/17/2025  6:00 AM [facility]   8/18/2025  9:15 AM Abhinav Elkins MD [place] [facility] DEP   10/8/2025  3:00 PM Phil Amin MD [facility]   12/8/2025 11:00 AM Abhinav Elkins MD [facility] DEP        (If no appt send self scheduling link. .REFILLAPPT)  Scheduling request sent?     [] Yes  [x] No    Does patient need updated?  [] Yes  [x] No

## 2025-06-06 NOTE — TELEPHONE ENCOUNTER
Last Appointment   4/18/2025  Next Appointment  8/18/2025  Is completely out of test strips ASAP to [facility].

## 2025-06-06 NOTE — TELEPHONE ENCOUNTER
Name of Medication(s) Requested:  Requested Prescriptions     Pending Prescriptions Disp Refills    umeclidinium bromide (INCRUSE ELLIPTA) 62.5 MCG/ACT inhaler [Pharmacy Med Name: Incruse Ellipta 62.5 MCG/INH Inhalation Aerosol Powder Breath Activated] 90 each 1     Sig: USE 1 INHALATION BY MOUTH DAILY       Medication is on current medication list Yes    Dosage and directions were verified? Yes    Quantity verified: 90 day supply     Pharmacy Verified?  Yes    Last Appointment:  4/18/2025    Future appts:  Future Appointments   Date Time Provider Department Center   6/10/2025  6:00 AM [facility] CARDIAC REHAB EX 01 Y[facility]   6/12/2025  6:00 AM [facility] CARDIAC REHAB EX 01 Y[facility]   6/17/2025  6:00 AM [facility] CARDIAC REHAB EX RM01 [facility]   8/18/2025  9:15 AM Abhinav Elkins MD [place] [facility] DEP   10/8/2025  3:00 PM Phil Amin MD [facility]   12/8/2025 11:00 AM Abhinav Elkins MD [place] [facility] DEP        (If no appt send self scheduling link. .REFILLAPPT)  Scheduling request sent?     [x] Yes  [] No    Does patient need updated?  [] Yes  [x] No

## 2025-06-10 ENCOUNTER — RESULTS FOLLOW-UP (OUTPATIENT)
Dept: CARDIOLOGY CLINIC | Age: 80
End: 2025-06-10

## 2025-06-10 ENCOUNTER — HOSPITAL ENCOUNTER (OUTPATIENT)
Dept: CARDIAC REHAB | Age: 80
Setting detail: THERAPIES SERIES
Discharge: HOME | End: 2025-06-10
Payer: MEDICARE

## 2025-06-10 PROCEDURE — 93798 PHYS/QHP OP CAR RHAB W/ECG: CPT

## 2025-06-10 NOTE — RESULT ENCOUNTER NOTE
Kidney function slightly abnormal compared to baseline.  Make sure staying well-hydrated.  Otherwise electrolytes look okay.  Liver function looks good.  Thyroid function looks good.    With creatinine above 1.5 can stay on current dose of apixaban 2.5 mg twice daily.

## 2025-06-11 NOTE — TELEPHONE ENCOUNTER
Patient's daughter Gianna notified of pt's lab results per Dr. Amin. Patient to stay well hydrated and can stay on Eliquis 2.5 mg BID per  Dr. Amin's recommendation.

## 2025-06-12 ENCOUNTER — HOSPITAL ENCOUNTER (OUTPATIENT)
Dept: CARDIAC REHAB | Age: 80
Setting detail: THERAPIES SERIES
Discharge: HOME | End: 2025-06-12
Payer: MEDICARE

## 2025-06-12 PROCEDURE — 93798 PHYS/QHP OP CAR RHAB W/ECG: CPT

## 2025-06-17 ENCOUNTER — HOSPITAL ENCOUNTER (OUTPATIENT)
Dept: CARDIAC REHAB | Age: 80
Setting detail: THERAPIES SERIES
Discharge: HOME | End: 2025-06-17
Payer: MEDICARE

## 2025-06-17 PROCEDURE — 93798 PHYS/QHP OP CAR RHAB W/ECG: CPT

## 2025-06-17 ASSESSMENT — PATIENT HEALTH QUESTIONNAIRE - PHQ9
SUM OF ALL RESPONSES TO PHQ QUESTIONS 1-9: 0
4. FEELING TIRED OR HAVING LITTLE ENERGY: NOT AT ALL
3. TROUBLE FALLING OR STAYING ASLEEP: NOT AT ALL
1. LITTLE INTEREST OR PLEASURE IN DOING THINGS: NOT AT ALL
10. IF YOU CHECKED OFF ANY PROBLEMS, HOW DIFFICULT HAVE THESE PROBLEMS MADE IT FOR YOU TO DO YOUR WORK, TAKE CARE OF THINGS AT HOME, OR GET ALONG WITH OTHER PEOPLE: NOT DIFFICULT AT ALL
SUM OF ALL RESPONSES TO PHQ QUESTIONS 1-9: 0
6. FEELING BAD ABOUT YOURSELF - OR THAT YOU ARE A FAILURE OR HAVE LET YOURSELF OR YOUR FAMILY DOWN: NOT AT ALL
9. THOUGHTS THAT YOU WOULD BE BETTER OFF DEAD, OR OF HURTING YOURSELF: NOT AT ALL
5. POOR APPETITE OR OVEREATING: NOT AT ALL
7. TROUBLE CONCENTRATING ON THINGS, SUCH AS READING THE NEWSPAPER OR WATCHING TELEVISION: NOT AT ALL
2. FEELING DOWN, DEPRESSED OR HOPELESS: NOT AT ALL
8. MOVING OR SPEAKING SO SLOWLY THAT OTHER PEOPLE COULD HAVE NOTICED. OR THE OPPOSITE, BEING SO FIGETY OR RESTLESS THAT YOU HAVE BEEN MOVING AROUND A LOT MORE THAN USUAL: NOT AT ALL

## 2025-06-19 ENCOUNTER — HOSPITAL ENCOUNTER (OUTPATIENT)
Dept: CARDIAC REHAB | Age: 80
Setting detail: THERAPIES SERIES
Discharge: HOME | End: 2025-06-19
Payer: MEDICARE

## 2025-06-19 PROCEDURE — 93797 PHYS/QHP OP CAR RHAB WO ECG: CPT

## 2025-06-24 ENCOUNTER — HOSPITAL ENCOUNTER (OUTPATIENT)
Dept: CARDIAC REHAB | Age: 80
Setting detail: THERAPIES SERIES
Discharge: HOME | End: 2025-06-24
Payer: MEDICARE

## 2025-06-24 PROCEDURE — 93798 PHYS/QHP OP CAR RHAB W/ECG: CPT

## 2025-06-25 ENCOUNTER — HOSPITAL ENCOUNTER (OUTPATIENT)
Age: 80
Discharge: HOME | End: 2025-06-25
Payer: MEDICARE

## 2025-06-25 LAB
ANION GAP SERPL CALCULATED.3IONS-SCNC: 10 MMOL/L (ref 7–16)
BILIRUB UR QL STRIP: NEGATIVE
BUN SERPL-MCNC: 26 MG/DL (ref 8–23)
CALCIUM SERPL-MCNC: 9.4 MG/DL (ref 8.8–10.2)
CHLORIDE SERPL-SCNC: 104 MMOL/L (ref 98–107)
CLARITY UR: CLEAR
CO2 SERPL-SCNC: 25 MMOL/L (ref 22–29)
COLOR UR: YELLOW
CREAT SERPL-MCNC: 1.4 MG/DL (ref 0.7–1.2)
CREAT UR-MCNC: 41 MG/DL (ref 40–278)
ERYTHROCYTE [DISTWIDTH] IN BLOOD BY AUTOMATED COUNT: 13.3 % (ref 11.5–15)
GFR, ESTIMATED: 52 ML/MIN/1.73M2
GLUCOSE SERPL-MCNC: 178 MG/DL (ref 74–99)
GLUCOSE UR STRIP-MCNC: NEGATIVE MG/DL
HCT VFR BLD AUTO: 35.1 % (ref 37–54)
HGB BLD-MCNC: 12 G/DL (ref 12.5–16.5)
HGB UR QL STRIP.AUTO: ABNORMAL
KETONES UR STRIP-MCNC: NEGATIVE MG/DL
LEUKOCYTE ESTERASE UR QL STRIP: NEGATIVE
MAGNESIUM SERPL-MCNC: 2.1 MG/DL (ref 1.6–2.4)
MCH RBC QN AUTO: 30.9 PG (ref 26–35)
MCHC RBC AUTO-ENTMCNC: 34.2 G/DL (ref 32–34.5)
MCV RBC AUTO: 90.5 FL (ref 80–99.9)
MICROALBUMIN UR-MCNC: 195 MG/L (ref 0–20)
MICROALBUMIN/CREAT UR-RTO: 476 MCG/MG CREAT (ref 0–30)
NITRITE UR QL STRIP: NEGATIVE
PH UR STRIP: 5.5 [PH] (ref 5–8)
PHOSPHATE SERPL-MCNC: 3.1 MG/DL (ref 2.5–4.5)
PLATELET # BLD AUTO: 182 K/UL (ref 130–450)
PMV BLD AUTO: 9.8 FL (ref 7–12)
POTASSIUM SERPL-SCNC: 4.3 MMOL/L (ref 3.5–5.1)
PROT UR STRIP-MCNC: ABNORMAL MG/DL
RBC # BLD AUTO: 3.88 M/UL (ref 3.8–5.8)
RBC #/AREA URNS HPF: ABNORMAL /HPF
SODIUM SERPL-SCNC: 139 MMOL/L (ref 136–145)
SP GR UR STRIP: 1.01 (ref 1–1.03)
UROBILINOGEN UR STRIP-ACNC: 0.2 EU/DL (ref 0–1)
WBC #/AREA URNS HPF: ABNORMAL /HPF
WBC OTHER # BLD: 6.6 K/UL (ref 4.5–11.5)

## 2025-06-25 PROCEDURE — 82570 ASSAY OF URINE CREATININE: CPT

## 2025-06-25 PROCEDURE — 36415 COLL VENOUS BLD VENIPUNCTURE: CPT

## 2025-06-25 PROCEDURE — 82043 UR ALBUMIN QUANTITATIVE: CPT

## 2025-06-25 PROCEDURE — 84100 ASSAY OF PHOSPHORUS: CPT

## 2025-06-25 PROCEDURE — 81001 URINALYSIS AUTO W/SCOPE: CPT

## 2025-06-25 PROCEDURE — 85027 COMPLETE CBC AUTOMATED: CPT

## 2025-06-25 PROCEDURE — 83735 ASSAY OF MAGNESIUM: CPT

## 2025-06-25 PROCEDURE — 80048 BASIC METABOLIC PNL TOTAL CA: CPT

## 2025-07-01 RX ORDER — INSULIN GLARGINE 100 [IU]/ML
INJECTION, SOLUTION SUBCUTANEOUS
Qty: 15 ML | Refills: 1 | Status: SHIPPED | OUTPATIENT
Start: 2025-07-01

## 2025-07-01 NOTE — TELEPHONE ENCOUNTER
Name of Medication(s) Requested:  Requested Prescriptions     Pending Prescriptions Disp Refills    LANTUS SOLOSTAR 100 UNIT/ML injection pen [Pharmacy Med Name: Lantus SoloStar 100 UNIT/ML Subcutaneous Solution Pen-injector] 15 mL 1     Sig: INJECT SUBCUTANEOUSLY 10 UNITS  DAILY       Medication is on current medication list Yes    Dosage and directions were verified? Yes    Quantity verified: 90 day supply     Pharmacy Verified?  Yes    Last Appointment:  4/18/2025    Future appts:  Future Appointments   Date Time Provider Department Center   8/7/2025 10:30 AM Abhinav Elkins MD [place] [facility] DEP   10/8/2025  3:00 PM Phil [facility] MD [facility]   12/8/2025 11:00 AM Abhinav Elkins MD [facility] DEP        (If no appt send self scheduling link. .REFILLAPPT)  Scheduling request sent?     [] Yes  [x] No    Does patient need updated?  [] Yes  [x] No

## 2025-07-03 RX ORDER — FOLIC ACID 1 MG/1
1 TABLET ORAL DAILY
Qty: 90 TABLET | Refills: 1 | Status: SHIPPED | OUTPATIENT
Start: 2025-07-03

## 2025-07-03 NOTE — TELEPHONE ENCOUNTER
Changing to local pharmacy    Name of Medication(s) Requested:  Requested Prescriptions     Pending Prescriptions Disp Refills    folic acid (FOLVITE) 1 MG tablet 90 tablet 1     Sig: Take 1 tablet by mouth daily       Medication is on current medication list Yes    Dosage and directions were verified? Yes    Quantity verified: 90 day supply     Pharmacy Verified?  Yes    Last Appointment:  4/18/2025    Future appts:  Future Appointments   Date Time Provider Department Center   8/7/2025 10:30 AM Abhinav Elkins MD [place] [facility] DEP   10/8/2025  3:00 PM Phil Amin MD [facility]   12/8/2025 11:00 AM Abhinav Elkins MD [place] [facility] DEP        (If no appt send self scheduling link. .REFILLAPPT)  Scheduling request sent?     [] Yes  [x] No    Does patient need updated?  [] Yes  [x] No

## 2025-08-06 SDOH — HEALTH STABILITY: PHYSICAL HEALTH: ON AVERAGE, HOW MANY MINUTES DO YOU ENGAGE IN EXERCISE AT THIS LEVEL?: 10 MIN

## 2025-08-06 SDOH — HEALTH STABILITY: PHYSICAL HEALTH: ON AVERAGE, HOW MANY DAYS PER WEEK DO YOU ENGAGE IN MODERATE TO STRENUOUS EXERCISE (LIKE A BRISK WALK)?: 0 DAYS

## 2025-08-06 ASSESSMENT — LIFESTYLE VARIABLES
HOW OFTEN DO YOU HAVE A DRINK CONTAINING ALCOHOL: NEVER
HOW OFTEN DO YOU HAVE A DRINK CONTAINING ALCOHOL: 1
HOW MANY STANDARD DRINKS CONTAINING ALCOHOL DO YOU HAVE ON A TYPICAL DAY: PATIENT DOES NOT DRINK
HOW MANY STANDARD DRINKS CONTAINING ALCOHOL DO YOU HAVE ON A TYPICAL DAY: 0
HOW OFTEN DO YOU HAVE SIX OR MORE DRINKS ON ONE OCCASION: 1

## 2025-08-06 ASSESSMENT — PATIENT HEALTH QUESTIONNAIRE - PHQ9
1. LITTLE INTEREST OR PLEASURE IN DOING THINGS: NOT AT ALL
SUM OF ALL RESPONSES TO PHQ QUESTIONS 1-9: 0
SUM OF ALL RESPONSES TO PHQ QUESTIONS 1-9: 0
2. FEELING DOWN, DEPRESSED OR HOPELESS: NOT AT ALL
SUM OF ALL RESPONSES TO PHQ QUESTIONS 1-9: 0
SUM OF ALL RESPONSES TO PHQ QUESTIONS 1-9: 0

## 2025-08-07 ENCOUNTER — OFFICE VISIT (OUTPATIENT)
Dept: FAMILY MEDICINE CLINIC | Age: 80
End: 2025-08-07
Payer: MEDICARE

## 2025-08-07 VITALS
RESPIRATION RATE: 18 BRPM | TEMPERATURE: 98.2 F | DIASTOLIC BLOOD PRESSURE: 68 MMHG | HEIGHT: 71 IN | BODY MASS INDEX: 35 KG/M2 | SYSTOLIC BLOOD PRESSURE: 148 MMHG | HEART RATE: 67 BPM | WEIGHT: 250 LBS | OXYGEN SATURATION: 98 %

## 2025-08-07 DIAGNOSIS — E11.22 TYPE 2 DIABETES MELLITUS WITH STAGE 3A CHRONIC KIDNEY DISEASE, WITH LONG-TERM CURRENT USE OF INSULIN (HCC): ICD-10-CM

## 2025-08-07 DIAGNOSIS — I48.0 PAROXYSMAL ATRIAL FIBRILLATION (HCC): ICD-10-CM

## 2025-08-07 DIAGNOSIS — N18.31 TYPE 2 DIABETES MELLITUS WITH STAGE 3A CHRONIC KIDNEY DISEASE, WITH LONG-TERM CURRENT USE OF INSULIN (HCC): ICD-10-CM

## 2025-08-07 DIAGNOSIS — G89.29 CHRONIC RIGHT SHOULDER PAIN: ICD-10-CM

## 2025-08-07 DIAGNOSIS — M25.511 CHRONIC RIGHT SHOULDER PAIN: ICD-10-CM

## 2025-08-07 DIAGNOSIS — Z00.00 MEDICARE ANNUAL WELLNESS VISIT, SUBSEQUENT: Primary | ICD-10-CM

## 2025-08-07 DIAGNOSIS — E78.5 HYPERLIPIDEMIA, UNSPECIFIED HYPERLIPIDEMIA TYPE: ICD-10-CM

## 2025-08-07 DIAGNOSIS — J44.9 CHRONIC OBSTRUCTIVE PULMONARY DISEASE, UNSPECIFIED COPD TYPE (HCC): ICD-10-CM

## 2025-08-07 DIAGNOSIS — Z79.4 TYPE 2 DIABETES MELLITUS WITH STAGE 3A CHRONIC KIDNEY DISEASE, WITH LONG-TERM CURRENT USE OF INSULIN (HCC): ICD-10-CM

## 2025-08-07 LAB — HBA1C MFR BLD: 7.4 %

## 2025-08-07 PROCEDURE — 83036 HEMOGLOBIN GLYCOSYLATED A1C: CPT | Performed by: FAMILY MEDICINE

## 2025-08-07 PROCEDURE — 1159F MED LIST DOCD IN RCRD: CPT | Performed by: FAMILY MEDICINE

## 2025-08-07 PROCEDURE — G0439 PPPS, SUBSEQ VISIT: HCPCS | Performed by: FAMILY MEDICINE

## 2025-08-07 PROCEDURE — 3051F HG A1C>EQUAL 7.0%<8.0%: CPT | Performed by: FAMILY MEDICINE

## 2025-08-07 PROCEDURE — 1123F ACP DISCUSS/DSCN MKR DOCD: CPT | Performed by: FAMILY MEDICINE

## 2025-08-07 PROCEDURE — 3077F SYST BP >= 140 MM HG: CPT | Performed by: FAMILY MEDICINE

## 2025-08-07 PROCEDURE — 3078F DIAST BP <80 MM HG: CPT | Performed by: FAMILY MEDICINE

## 2025-08-07 RX ORDER — INSULIN GLARGINE 100 [IU]/ML
12 INJECTION, SOLUTION SUBCUTANEOUS NIGHTLY
Qty: 15 ML | Refills: 1 | Status: SHIPPED | OUTPATIENT
Start: 2025-08-07

## 2025-08-07 RX ORDER — METOPROLOL SUCCINATE 25 MG/1
25 TABLET, EXTENDED RELEASE ORAL 2 TIMES DAILY
Qty: 180 TABLET | Refills: 3 | Status: SHIPPED | OUTPATIENT
Start: 2025-08-07

## 2025-08-07 RX ORDER — BUDESONIDE AND FORMOTEROL FUMARATE DIHYDRATE 160; 4.5 UG/1; UG/1
2 AEROSOL RESPIRATORY (INHALATION) 2 TIMES DAILY
Qty: 3 EACH | Refills: 1 | Status: SHIPPED | OUTPATIENT
Start: 2025-08-07

## (undated) DEVICE — SOLUTION IV IRRIG POUR BRL 0.9% SODIUM CHL 2F7124

## (undated) DEVICE — Device

## (undated) DEVICE — SOLUTION SCRB 32OZ 7.5% POVIDONE IOD BTL GENTLE EFFECTIVE

## (undated) DEVICE — PAD, DEFIB, ADULT, RADIOTRAN, PHYSIO, LO: Brand: MEDLINE

## (undated) DEVICE — APPLICATOR MEDICATED 26 CC SOLUTION HI LT ORNG CHLORAPREP

## (undated) DEVICE — SUTURE STRATAFIX SYMMETRIC SZ 1 L18IN ABSRB VLT CT1 L36CM SXPP1A404

## (undated) DEVICE — DRAPE,TOP,102X53,STERILE: Brand: MEDLINE

## (undated) DEVICE — 3M™ COBAN™ NL STERILE NON-LATEX SELF-ADHERENT WRAP, 2084S, 4 IN X 5 YD (10 CM X 4,5 M), 18 ROLLS/CASE: Brand: 3M™ COBAN™

## (undated) DEVICE — KIT ANGIO W/ AT P65 PREM HND CTRL FOR CNTRST DEL ANGIOTOUCH

## (undated) DEVICE — SURGICAL PROCEDURE PACK CATRCT LT EYE BASIC CUST ST JOS LF

## (undated) DEVICE — KIT MFLD ISOLATN NACL CNTRST PRT TBNG SPIK W/ PRSS TRNSDUC

## (undated) DEVICE — KIT DRP FOR RIO ROBOTIC ARM ASST SYS

## (undated) DEVICE — STRYKER PERFORMANCE SERIES SAGITTAL BLADE: Brand: STRYKER PERFORMANCE SERIES

## (undated) DEVICE — PIN BNE FIX TEMP L110MM DIA4MM MAKO

## (undated) DEVICE — GLOVE ORANGE PI 8   MSG9080

## (undated) DEVICE — GOWN,SIRUS,POLYRNF,BRTHSLV,XLN/XL,20/CS: Brand: MEDLINE

## (undated) DEVICE — CORD RETRCT SIL - ORDER MULTIPLES OF 10 EACH

## (undated) DEVICE — DOUBLE BASIN SET: Brand: MEDLINE INDUSTRIES, INC.

## (undated) DEVICE — NEEDLE HYPO 18GA L1.5IN PNK POLYPR HUB S STL REG BVL STR

## (undated) DEVICE — BNDG,ELSTC,MATRIX,STRL,6"X5YD,LF,HOOK&LP: Brand: MEDLINE

## (undated) DEVICE — TOWEL,OR,DSP,ST,BLUE,STD,6/PK,12PK/CS: Brand: MEDLINE

## (undated) DEVICE — CATHETER ANGIO FL3.5 0.045 INX5 FRX100 CM THRULUMEN TRILON

## (undated) DEVICE — 3 ML SYRINGE LUER-LOCK TIP: Brand: MONOJECT

## (undated) DEVICE — BLADE SURG SAW S STL NAR OSC W/ SERR EDGE DISP

## (undated) DEVICE — KIT INT FIX FEM TIB CKPT MAKOPLASTY

## (undated) DEVICE — LIQUIBAND RAPID ADHESIVE 36/CS 0.8ML: Brand: MEDLINE

## (undated) DEVICE — DEVICE COMPR LNG 27 CM VASC BND

## (undated) DEVICE — DRESSING HYDROFIBER AQUACEL AG ADVANTAGE 3.5X6 IN

## (undated) DEVICE — STERILE POLYISOPRENE POWDER-FREE SURGICAL GLOVES: Brand: PROTEXIS

## (undated) DEVICE — GOWN,SIRUS,FABRNF,XL,20/CS: Brand: MEDLINE

## (undated) DEVICE — PACK SURG CARDIAC CATH

## (undated) DEVICE — SYRINGE MED 30ML STD CLR PLAS LUERLOCK TIP N CTRL DISP

## (undated) DEVICE — SPONGE LAP W18XL18IN WHT COT 4 PLY FLD STRUNG RADPQ DISP ST 2 PER PACK

## (undated) DEVICE — TUBING, SUCTION, 9/32" X 10', STRAIGHT: Brand: MEDLINE

## (undated) DEVICE — 4-PORT MANIFOLD: Brand: NEPTUNE 2

## (undated) DEVICE — SOLUTION IRRIG 3000ML 0.9% SOD CHL USP UROMATIC PLAS CONT

## (undated) DEVICE — SOLUTION IV IRRIG WATER 1000ML POUR BRL 2F7114

## (undated) DEVICE — HANDPIECE SET WITH COAXIAL HIGH FLOW TIP AND SUCTION TUBE: Brand: INTERPULSE

## (undated) DEVICE — BOOT POS LEG DEMAYO

## (undated) DEVICE — GLOVE SURG SZ 8 L12IN FNGR THK79MIL GRN LTX FREE

## (undated) DEVICE — SOLUTION IRRIG 1000ML STRL H2O USP PLAS POUR BTL

## (undated) DEVICE — 3M™ IOBAN™ 2 ANTIMICROBIAL INCISE DRAPE 6651EZ: Brand: IOBAN™ 2

## (undated) DEVICE — 3M™ STERI-DRAPE™ U-DRAPE 1015: Brand: STERI-DRAPE™

## (undated) DEVICE — GLOVE SURG SZ 8 CRM LTX FREE POLYISOPRENE POLYMER BEAD ANTI

## (undated) DEVICE — GLIDESHEATH SLENDER ACCESS KIT: Brand: GLIDESHEATH SLENDER

## (undated) DEVICE — KIT SURG W7XL11IN 2 PKT UNTREATED NA

## (undated) DEVICE — ENCORE® LATEX MICRO SIZE 8.5, STERILE LATEX POWDER-FREE SURGICAL GLOVE: Brand: ENCORE

## (undated) DEVICE — BANDAGE COMPR W6INXL12FT SMOOTH FOR LIMB EXSANG ESMARCH

## (undated) DEVICE — GLOVE ORTHO 8   MSG9480

## (undated) DEVICE — PIN BNE FIX TEMP L140MM DIA4MM MAKO

## (undated) DEVICE — KIT TRK KNEE PROC VIZADISC

## (undated) DEVICE — ZIMMER® STERILE DISPOSABLE TOURNIQUET CUFF WITH PLC, DUAL PORT, SINGLE BLADDER, 30 IN. (76 CM)

## (undated) DEVICE — ELECTRODE PT RET AD L9FT HI MOIST COND ADH HYDRGEL CORDED

## (undated) DEVICE — DRESSING FOAM ADH POLYUR W/ SIL WND SHT 4IN LEN 4IN W

## (undated) DEVICE — TAPE ADH W3INXL10YD WHT COT WVN BK POWERFUL RUB BASE HIGHLY

## (undated) DEVICE — GUIDEWIRE VASC L260CM 0.035IN J TIP L3MM PTFE FIX COR NAMIC

## (undated) DEVICE — SOLUTION IRRIG 1000ML 0.9% SOD CHL USP POUR PLAS BTL

## (undated) DEVICE — DRAPE,REIN 53X77,STERILE: Brand: MEDLINE

## (undated) DEVICE — ANGIOGRAPHIC CATHETER: Brand: EXPO™

## (undated) DEVICE — CANNULA NSL CANN NSL L25FT TBNG AD O2 SUP SFT UC